# Patient Record
Sex: FEMALE | Race: WHITE | NOT HISPANIC OR LATINO | Employment: FULL TIME | URBAN - METROPOLITAN AREA
[De-identification: names, ages, dates, MRNs, and addresses within clinical notes are randomized per-mention and may not be internally consistent; named-entity substitution may affect disease eponyms.]

---

## 2020-03-11 ENCOUNTER — OFFICE VISIT (OUTPATIENT)
Dept: FAMILY MEDICINE CLINIC | Facility: CLINIC | Age: 50
End: 2020-03-11
Payer: COMMERCIAL

## 2020-03-11 VITALS
HEIGHT: 63 IN | BODY MASS INDEX: 33.42 KG/M2 | HEART RATE: 80 BPM | SYSTOLIC BLOOD PRESSURE: 130 MMHG | WEIGHT: 188.6 LBS | TEMPERATURE: 97.1 F | OXYGEN SATURATION: 97 % | RESPIRATION RATE: 12 BRPM | DIASTOLIC BLOOD PRESSURE: 82 MMHG

## 2020-03-11 DIAGNOSIS — Z12.11 SCREENING FOR COLORECTAL CANCER: ICD-10-CM

## 2020-03-11 DIAGNOSIS — Z12.31 ENCOUNTER FOR SCREENING MAMMOGRAM FOR BREAST CANCER: ICD-10-CM

## 2020-03-11 DIAGNOSIS — M79.642 BILATERAL HAND PAIN: ICD-10-CM

## 2020-03-11 DIAGNOSIS — R53.83 OTHER FATIGUE: ICD-10-CM

## 2020-03-11 DIAGNOSIS — Z12.12 SCREENING FOR COLORECTAL CANCER: ICD-10-CM

## 2020-03-11 DIAGNOSIS — Z13.1 SCREENING FOR DIABETES MELLITUS: ICD-10-CM

## 2020-03-11 DIAGNOSIS — Z13.6 SCREENING FOR CARDIOVASCULAR CONDITION: ICD-10-CM

## 2020-03-11 DIAGNOSIS — Z00.00 ANNUAL PHYSICAL EXAM: Primary | ICD-10-CM

## 2020-03-11 DIAGNOSIS — M25.50 ARTHRALGIA, UNSPECIFIED JOINT: ICD-10-CM

## 2020-03-11 DIAGNOSIS — Z12.4 SCREENING FOR CERVICAL CANCER: ICD-10-CM

## 2020-03-11 DIAGNOSIS — M79.641 BILATERAL HAND PAIN: ICD-10-CM

## 2020-03-11 PROCEDURE — 99386 PREV VISIT NEW AGE 40-64: CPT | Performed by: FAMILY MEDICINE

## 2020-03-11 NOTE — PROGRESS NOTES
[address] [facility] [address]    NAME: Lori Pederson  AGE: 45 y o  SEX: female  : 1970     DATE: 3/11/2020     Assessment and Plan:     Problem List Items Addressed This Visit     None      Visit Diagnoses     Annual physical exam    -  Primary    Ferdinand Cheadle is stable on exam   She is to continue to work on a lower carb diet, and regular exercise  FBW with TSH incl was ordered  Encounter for screening mammogram for breast cancer        Mammogram ordered  Relevant Orders    Mammo screening bilateral w cad    Screening for cervical cancer        Pt referred to OB/GYN at her request     Relevant Orders    Ambulatory referral to Obstetrics / Gynecology    Screening for colorectal cancer        Cologuard ordered  Relevant Orders    Cologuard    Screening for diabetes mellitus        Relevant Orders    Comprehensive metabolic panel    Screening for cardiovascular condition        Relevant Orders    Lipid Panel with Direct LDL reflex    Other fatigue        Relevant Orders    CBC and differential    TSH, 3rd generation with Free T4 reflex    BMI 33 0-33 9,adult        Diet and exercise as above  Bilateral hand pain        Likely OA changes - discussed performing warm soaks, using phong/Play Amy to work on hand strengthening, etc   Check xrays, arthritis screenings  Relevant Orders    XR hand 3+ vw left    XR hand 3+ vw right    Arthralgia, unspecified joint        As above  Relevant Orders    Sedimentation rate, automated    C-reactive protein    Lyme Antibody Profile with reflex to WB    Uric acid    BRYANT Screen w/ Reflex to Titer/Pattern    RF Screen w/ Reflex to Titer    Cyclic citrul peptide antibody, IgG          Immunizations and preventive care screenings were discussed with patient today  Appropriate education was printed on patient's after visit summary      Counseling:  Dental Health: discussed importance of regular tooth brushing, flossing, and dental visits  · Exercise: the importance of regular exercise/physical activity was discussed  Recommend exercise 3-5 times per week for at least 30 minutes  Return in 3 months (on 6/11/2020)  Chief Complaint:     Chief Complaint   Patient presents with    Annual Exam      History of Present Illness:     Adult Annual Physical   Patient here for a comprehensive physical exam  The patient reports no problems  Devang Rocha is new to the practice today  She has a hx of gout in the past   Has had issues for a few months with swelling of joints in fingers  Has been back working out at the gym x 2 months  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[facility] )  Never had mammogram   Needs PAP smear  Needs colon cancer screening  Formerly smoked cigarettes; now vapes - we discussed  Single; has an [place] Mastiff (133 lbs)  Declines colon cancer screening by GI  Diet and Physical Activity  · Diet/Nutrition: well balanced diet  · Exercise: 3-4 times a week on average  Depression Screening  PHQ-9 Depression Screening    PHQ-9:    Frequency of the following problems over the past two weeks:       Little interest or pleasure in doing things:  0 - not at all  Feeling down, depressed, or hopeless:  0 - not at all  PHQ-2 Score:  0       General Health  · Sleep: sleeps well  · Hearing: normal - bilateral   · Vision: no vision problems  · Dental: regular dental visits  /GYN Health  · Patient is: perimenopausal  · Last menstrual period: 3 months ago  · Contraceptive method: None  Review of Systems:     Review of Systems   Constitutional: Negative for activity change  Respiratory: Negative for shortness of breath  No GUSMAN  Cardiovascular: Negative for chest pain  No current CP/SOB  Gastrointestinal: Negative for abdominal pain, blood in stool and constipation  Genitourinary: Negative for menstrual problem  No new breast lumps on self-examination  Musculoskeletal: Positive for arthralgias  Past Medical History:     Past Medical History:   Diagnosis Date    Anxiety     Depression       Past Surgical History:     Past Surgical History:   Procedure Laterality Date    ROTATOR CUFF REPAIR        Social History:        Social History     Socioeconomic History    Marital status: Single     Spouse name: None    Number of children: None    Years of education: None    Highest education level: None   Occupational History    None   Social Needs    Financial resource strain: None    Food insecurity:     Worry: None     Inability: None    Transportation needs:     Medical: None     Non-medical: None   Tobacco Use    Smoking status: Former Smoker    Smokeless tobacco: Never Used   Substance and Sexual Activity    Alcohol use: Yes     Binge frequency: Less than monthly    Drug use: Never    Sexual activity: None   Lifestyle    Physical activity:     Days per week: None     Minutes per session: None    Stress: None   Relationships    Social connections:     Talks on phone: None     Gets together: None     Attends Worship service: None     Active member of club or organization: None     Attends meetings of clubs or organizations: None     Relationship status: None    Intimate partner violence:     Fear of current or ex partner: None     Emotionally abused: None     Physically abused: None     Forced sexual activity: None   Other Topics Concern    None   Social History Narrative    None      Family History:     History reviewed  No pertinent family history  Current Medications:     No current outpatient medications on file  No current facility-administered medications for this visit  Allergies:      Allergies   Allergen Reactions    Clarithromycin GI Intolerance      Physical Exam:     /82 (BP Location: Right arm, Patient Position: Sitting, Cuff Size: Standard)   Pulse 80   Temp (!) 97 1 °F (36 2 °C) (Tympanic)   Resp 12   Ht 5' 2 76" (1 594 m)   Wt 85 5 kg (188 lb 9 6 oz)   SpO2 97%   BMI 33 67 kg/m²     Physical Exam   Constitutional: She is oriented to person, place, and time  She appears well-developed and well-nourished  No distress  HENT:   Head: Normocephalic and atraumatic  Right Ear: Hearing, tympanic membrane, external ear and ear canal normal    Left Ear: Hearing, tympanic membrane, external ear and ear canal normal    Mouth/Throat: Oropharynx is clear and moist  No oropharyngeal exudate  Eyes: Conjunctivae are normal    Neck: Normal range of motion  Neck supple  No thyromegaly present  Cardiovascular: Normal rate, regular rhythm and normal heart sounds  Exam reveals no gallop and no friction rub  No murmur heard  Pulmonary/Chest: Effort normal and breath sounds normal  No stridor  No respiratory distress  She has no wheezes  She has no rales  Abdominal: Soft  Bowel sounds are normal  She exhibits no distension and no mass  There is no tenderness  There is no rebound and no guarding  Musculoskeletal:        Hands:  Lymphadenopathy:     She has no cervical adenopathy  Neurological: She is alert and oriented to person, place, and time  Skin: She is not diaphoretic  Psychiatric: She has a normal mood and affect  Her behavior is normal  Judgment and thought content normal    Nursing note and vitals reviewed        Gonzalo Garcia, [address] [facility]

## 2020-03-11 NOTE — PATIENT INSTRUCTIONS

## 2020-04-30 ENCOUNTER — OFFICE VISIT (OUTPATIENT)
Dept: FAMILY MEDICINE CLINIC | Facility: CLINIC | Age: 50
End: 2020-04-30
Payer: COMMERCIAL

## 2020-04-30 VITALS
WEIGHT: 191.4 LBS | BODY MASS INDEX: 34.17 KG/M2 | HEART RATE: 74 BPM | OXYGEN SATURATION: 99 % | DIASTOLIC BLOOD PRESSURE: 78 MMHG | SYSTOLIC BLOOD PRESSURE: 118 MMHG | TEMPERATURE: 97 F | RESPIRATION RATE: 14 BRPM

## 2020-04-30 DIAGNOSIS — R53.83 OTHER FATIGUE: ICD-10-CM

## 2020-04-30 DIAGNOSIS — Z13.6 SCREENING FOR CARDIOVASCULAR CONDITION: ICD-10-CM

## 2020-04-30 DIAGNOSIS — M25.50 ARTHRALGIA, UNSPECIFIED JOINT: Primary | ICD-10-CM

## 2020-04-30 DIAGNOSIS — Z13.1 SCREENING FOR DIABETES MELLITUS: ICD-10-CM

## 2020-04-30 PROCEDURE — 99213 OFFICE O/P EST LOW 20 MIN: CPT | Performed by: FAMILY MEDICINE

## 2020-04-30 PROCEDURE — 1036F TOBACCO NON-USER: CPT | Performed by: FAMILY MEDICINE

## 2020-04-30 RX ORDER — NAPROXEN 500 MG/1
500 TABLET ORAL 2 TIMES DAILY WITH MEALS
Qty: 40 TABLET | Refills: 1 | Status: SHIPPED | OUTPATIENT
Start: 2020-04-30 | End: 2020-05-08 | Stop reason: SDUPTHER

## 2020-05-08 ENCOUNTER — TELEPHONE (OUTPATIENT)
Dept: FAMILY MEDICINE CLINIC | Facility: CLINIC | Age: 50
End: 2020-05-08

## 2020-05-08 DIAGNOSIS — M05.80 POLYARTHRITIS WITH POSITIVE RHEUMATOID FACTOR (HCC): ICD-10-CM

## 2020-05-08 DIAGNOSIS — M25.50 ARTHRALGIA, UNSPECIFIED JOINT: Primary | ICD-10-CM

## 2020-05-08 RX ORDER — MELOXICAM 7.5 MG/1
7.5 TABLET ORAL DAILY
Qty: 30 TABLET | Refills: 5 | Status: SHIPPED | OUTPATIENT
Start: 2020-05-08 | End: 2020-08-19 | Stop reason: ALTCHOICE

## 2020-05-09 LAB
ALBUMIN SERPL-MCNC: 3.9 G/DL (ref 3.6–5.1)
ALBUMIN/GLOB SERPL: 1.4 (CALC) (ref 1–2.5)
ALP SERPL-CCNC: 63 U/L (ref 37–153)
ALT SERPL-CCNC: 17 U/L (ref 6–29)
ANA SER QL IF: NEGATIVE
AST SERPL-CCNC: 14 U/L (ref 10–35)
B BURGDOR AB SER IA-ACNC: <0.9 INDEX
BASOPHILS # BLD AUTO: 68 CELLS/UL (ref 0–200)
BASOPHILS NFR BLD AUTO: 1 %
BILIRUB SERPL-MCNC: 0.3 MG/DL (ref 0.2–1.2)
BUN SERPL-MCNC: 21 MG/DL (ref 7–25)
BUN/CREAT SERPL: NORMAL (CALC) (ref 6–22)
CALCIUM SERPL-MCNC: 9.3 MG/DL (ref 8.6–10.4)
CCP IGG SERPL-ACNC: 59 UNITS
CHLORIDE SERPL-SCNC: 109 MMOL/L (ref 98–110)
CHOLEST SERPL-MCNC: 186 MG/DL
CHOLEST/HDLC SERPL: 3.4 (CALC)
CO2 SERPL-SCNC: 27 MMOL/L (ref 20–32)
CREAT SERPL-MCNC: 0.7 MG/DL (ref 0.5–1.05)
CRP SERPL-MCNC: 9.2 MG/L
EOSINOPHIL # BLD AUTO: 163 CELLS/UL (ref 15–500)
EOSINOPHIL NFR BLD AUTO: 2.4 %
ERYTHROCYTE [DISTWIDTH] IN BLOOD BY AUTOMATED COUNT: 12.8 % (ref 11–15)
ERYTHROCYTE [SEDIMENTATION RATE] IN BLOOD BY WESTERGREN METHOD: 19 MM/H
GLOBULIN SER CALC-MCNC: 2.8 G/DL (CALC) (ref 1.9–3.7)
GLUCOSE SERPL-MCNC: 78 MG/DL (ref 65–99)
HCT VFR BLD AUTO: 35.7 % (ref 35–45)
HDLC SERPL-MCNC: 54 MG/DL
HGB BLD-MCNC: 11.7 G/DL (ref 11.7–15.5)
LDLC SERPL CALC-MCNC: 114 MG/DL (CALC)
LYMPHOCYTES # BLD AUTO: 1659 CELLS/UL (ref 850–3900)
LYMPHOCYTES NFR BLD AUTO: 24.4 %
MCH RBC QN AUTO: 28.7 PG (ref 27–33)
MCHC RBC AUTO-ENTMCNC: 32.8 G/DL (ref 32–36)
MCV RBC AUTO: 87.5 FL (ref 80–100)
MONOCYTES # BLD AUTO: 551 CELLS/UL (ref 200–950)
MONOCYTES NFR BLD AUTO: 8.1 %
NEUTROPHILS # BLD AUTO: 4359 CELLS/UL (ref 1500–7800)
NEUTROPHILS NFR BLD AUTO: 64.1 %
NONHDLC SERPL-MCNC: 132 MG/DL (CALC)
PLATELET # BLD AUTO: 349 THOUSAND/UL (ref 140–400)
PMV BLD REES-ECKER: 9.9 FL (ref 7.5–12.5)
POTASSIUM SERPL-SCNC: 4.6 MMOL/L (ref 3.5–5.3)
PROT SERPL-MCNC: 6.7 G/DL (ref 6.1–8.1)
RBC # BLD AUTO: 4.08 MILLION/UL (ref 3.8–5.1)
RHEUMATOID FACT SERPL-ACNC: 45 IU/ML
SL AMB EGFR AFRICAN AMERICAN: 116 ML/MIN/1.73M2
SL AMB EGFR NON AFRICAN AMERICAN: 100 ML/MIN/1.73M2
SODIUM SERPL-SCNC: 142 MMOL/L (ref 135–146)
TRIGL SERPL-MCNC: 85 MG/DL
TSH SERPL-ACNC: 1.35 MIU/L
URATE SERPL-MCNC: 4.8 MG/DL (ref 2.5–7)
WBC # BLD AUTO: 6.8 THOUSAND/UL (ref 3.8–10.8)

## 2020-05-12 ENCOUNTER — TELEPHONE (OUTPATIENT)
Dept: OBGYN CLINIC | Facility: HOSPITAL | Age: 50
End: 2020-05-12

## 2020-05-12 DIAGNOSIS — M05.79 RHEUMATOID ARTHRITIS INVOLVING MULTIPLE SITES WITH POSITIVE RHEUMATOID FACTOR (HCC): Primary | ICD-10-CM

## 2020-05-12 RX ORDER — PREDNISONE 1 MG/1
TABLET ORAL
Qty: 70 TABLET | Refills: 0 | Status: SHIPPED | OUTPATIENT
Start: 2020-05-12 | End: 2020-06-14

## 2020-05-13 ENCOUNTER — TELEPHONE (OUTPATIENT)
Dept: OBGYN CLINIC | Facility: HOSPITAL | Age: 50
End: 2020-05-13

## 2020-05-13 ENCOUNTER — TELEMEDICINE (OUTPATIENT)
Dept: RHEUMATOLOGY | Facility: CLINIC | Age: 50
End: 2020-05-13
Payer: COMMERCIAL

## 2020-05-13 DIAGNOSIS — Z79.899 HIGH RISK MEDICATION USE: ICD-10-CM

## 2020-05-13 DIAGNOSIS — M05.79 RHEUMATOID ARTHRITIS INVOLVING MULTIPLE SITES WITH POSITIVE RHEUMATOID FACTOR (HCC): Primary | ICD-10-CM

## 2020-05-13 PROCEDURE — 99204 OFFICE O/P NEW MOD 45 MIN: CPT | Performed by: INTERNAL MEDICINE

## 2020-05-13 RX ORDER — HYDROXYCHLOROQUINE SULFATE 200 MG/1
200 TABLET, FILM COATED ORAL 2 TIMES DAILY
Qty: 60 TABLET | Refills: 3 | Status: SHIPPED | OUTPATIENT
Start: 2020-05-13 | End: 2020-07-22 | Stop reason: SDUPTHER

## 2020-06-11 ENCOUNTER — TELEPHONE (OUTPATIENT)
Dept: OBGYN CLINIC | Facility: CLINIC | Age: 50
End: 2020-06-11

## 2020-06-11 ENCOUNTER — TELEPHONE (OUTPATIENT)
Dept: OBGYN CLINIC | Facility: MEDICAL CENTER | Age: 50
End: 2020-06-11

## 2020-06-14 DIAGNOSIS — M05.79 RHEUMATOID ARTHRITIS INVOLVING MULTIPLE SITES WITH POSITIVE RHEUMATOID FACTOR (HCC): Primary | ICD-10-CM

## 2020-06-14 RX ORDER — PREDNISONE 1 MG/1
5 TABLET ORAL DAILY
Qty: 30 TABLET | Refills: 0 | Status: SHIPPED | OUTPATIENT
Start: 2020-06-14 | End: 2020-08-19 | Stop reason: ALTCHOICE

## 2020-06-17 ENCOUNTER — TELEPHONE (OUTPATIENT)
Dept: OBGYN CLINIC | Facility: HOSPITAL | Age: 50
End: 2020-06-17

## 2020-07-22 ENCOUNTER — TELEPHONE (OUTPATIENT)
Dept: OBGYN CLINIC | Facility: HOSPITAL | Age: 50
End: 2020-07-22

## 2020-07-22 DIAGNOSIS — M05.79 RHEUMATOID ARTHRITIS INVOLVING MULTIPLE SITES WITH POSITIVE RHEUMATOID FACTOR (HCC): ICD-10-CM

## 2020-07-22 RX ORDER — HYDROXYCHLOROQUINE SULFATE 200 MG/1
200 TABLET, FILM COATED ORAL 2 TIMES DAILY
Qty: 180 TABLET | Refills: 1 | Status: SHIPPED | OUTPATIENT
Start: 2020-07-22 | End: 2020-08-19 | Stop reason: SDUPTHER

## 2020-07-22 NOTE — TELEPHONE ENCOUNTER
[facility] contacted Call Center requested refill of their medication  Medication Name:hydroxychloroquine (PLAQUENIL) 200 mg tablet       Dosage of Med:      Frequency of Med:      Remaining Medication:      Pharmacy and Hany Akbar        Pt  Preferred Callback Phone Number:      Thank you      [facility]#[phone]  Fax #[phone]

## 2020-08-10 ENCOUNTER — OFFICE VISIT (OUTPATIENT)
Dept: FAMILY MEDICINE CLINIC | Facility: CLINIC | Age: 50
End: 2020-08-10
Payer: COMMERCIAL

## 2020-08-10 ENCOUNTER — HOSPITAL ENCOUNTER (OUTPATIENT)
Dept: NON INVASIVE DIAGNOSTICS | Facility: CLINIC | Age: 50
Discharge: HOME | End: 2020-08-10
Payer: COMMERCIAL

## 2020-08-10 VITALS
HEIGHT: 63 IN | OXYGEN SATURATION: 97 % | WEIGHT: 190 LBS | BODY MASS INDEX: 33.66 KG/M2 | HEART RATE: 87 BPM | TEMPERATURE: 97.8 F | DIASTOLIC BLOOD PRESSURE: 70 MMHG | RESPIRATION RATE: 16 BRPM | SYSTOLIC BLOOD PRESSURE: 110 MMHG

## 2020-08-10 DIAGNOSIS — R60.0 EDEMA LEG: ICD-10-CM

## 2020-08-10 DIAGNOSIS — R60.0 EDEMA LEG: Primary | ICD-10-CM

## 2020-08-10 PROBLEM — M65.20 CALCIFIC TENDINITIS: Status: ACTIVE | Noted: 2018-08-09

## 2020-08-10 PROBLEM — M54.2 CERVICAL PAIN: Status: ACTIVE | Noted: 2018-08-09

## 2020-08-10 PROBLEM — M19.90 ARTHRITIS: Status: ACTIVE | Noted: 2020-08-10

## 2020-08-10 PROCEDURE — 93971 EXTREMITY STUDY: CPT | Performed by: SURGERY

## 2020-08-10 PROCEDURE — 3008F BODY MASS INDEX DOCD: CPT | Performed by: NURSE PRACTITIONER

## 2020-08-10 PROCEDURE — 1036F TOBACCO NON-USER: CPT | Performed by: NURSE PRACTITIONER

## 2020-08-10 PROCEDURE — 93971 EXTREMITY STUDY: CPT

## 2020-08-10 PROCEDURE — 99213 OFFICE O/P EST LOW 20 MIN: CPT | Performed by: NURSE PRACTITIONER

## 2020-08-10 NOTE — PROGRESS NOTES
Assessment/Plan:           Problem List Items Addressed This Visit     None      Visit Diagnoses     Edema leg    -  Primary    Relevant Orders    VAS lower limb venous duplex study, unilateral/limited          Water with lemon  Defer diuretic at present      Subjective:      Patient ID: Yovani Carlos is a 45 y o  female  Here for c/o right shin swelling and pain  Started less than one week ago  No recent travel   No  Trauma  C/o knee pain and swelling  Dx with rheumatoid arthritis recently  No sob or rayo   no chest pain        The following portions of the patient's history were reviewed and updated as appropriate: allergies, current medications, past family history, past medical history, past social history, past surgical history and problem list     Review of Systems   Constitutional: Positive for fatigue  Negative for diaphoresis and fever  Respiratory: Negative for cough, shortness of breath and wheezing  Cardiovascular: Positive for leg swelling  Negative for chest pain and palpitations  Gastrointestinal: Negative for constipation and diarrhea  Musculoskeletal: Positive for arthralgias and myalgias  Skin: Negative for rash  Neurological: Negative for dizziness, light-headedness and headaches  Hematological: Negative for adenopathy  Psychiatric/Behavioral: Negative for dysphoric mood and sleep disturbance  The patient is not nervous/anxious  Objective:      /70   Pulse 87   Temp 97 8 °F (36 6 °C)   Resp 16   Ht 5' 2 76" (1 594 m)   Wt 86 2 kg (190 lb)   SpO2 97%   BMI 33 91 kg/m²     History reviewed  No pertinent family history    Past Medical History:   Diagnosis Date    Anxiety     Depression      Social History     Socioeconomic History    Marital status: Single     Spouse name: Not on file    Number of children: Not on file    Years of education: Not on file    Highest education level: Not on file   Occupational History    Not on file   Social Needs    Financial resource strain: Not on file    Food insecurity     Worry: Not on file     Inability: Not on file    Transportation needs     Medical: Not on file     Non-medical: Not on file   Tobacco Use    Smoking status: Former Smoker    Smokeless tobacco: Never Used   Substance and Sexual Activity    Alcohol use: Yes     Binge frequency: Less than monthly    Drug use: Never    Sexual activity: Not on file   Lifestyle    Physical activity     Days per week: Not on file     Minutes per session: Not on file    Stress: Not on file   Relationships    Social connections     Talks on phone: Not on file     Gets together: Not on file     Attends [employer] service: Not on file     Active member of club or organization: Not on file     Attends meetings of clubs or organizations: Not on file     Relationship status: Not on file    Intimate partner violence     Fear of current or ex partner: Not on file     Emotionally abused: Not on file     Physically abused: Not on file     Forced sexual activity: Not on file   Other Topics Concern    Not on file   Social History Narrative    Not on file       Current Outpatient Medications:     hydroxychloroquine (PLAQUENIL) 200 mg tablet, Take 1 tablet (200 mg total) by mouth 2 (two) times a day, Disp: 180 tablet, Rfl: 1    predniSONE 5 mg tablet, Take 1 tablet (5 mg total) by mouth daily, Disp: 30 tablet, Rfl: 0    meloxicam (MOBIC) 7 5 mg tablet, Take 1 tablet (7 5 mg total) by mouth daily (Patient not taking: Reported on 8/10/2020), Disp: 30 tablet, Rfl: 5  Allergies   Allergen Reactions    Clarithromycin GI Intolerance        Physical Exam  Vitals signs and nursing note reviewed  Cardiovascular:      Rate and Rhythm: Normal rate and regular rhythm  Pulmonary:      Effort: Pulmonary effort is normal       Breath sounds: Normal breath sounds  Musculoskeletal: Normal range of motion  General: Swelling present  Right lower leg: She exhibits swelling  Legs:       Comments: 2cm lump medial aspect of shin  Minimal leg swelling right leg     Skin:     General: Skin is warm and dry  Capillary Refill: Capillary refill takes less than 2 seconds  Neurological:      General: No focal deficit present  Mental Status: She is alert and oriented to person, place, and time  Psychiatric:         Mood and Affect: Mood normal          Behavior: Behavior normal          Thought Content: Thought content normal          Judgment: Judgment normal            BMI Counseling: Body mass index is 33 91 kg/m²  The BMI is above normal  Nutrition recommendations include reducing portion sizes, decreasing overall calorie intake, 3-5 servings of fruits/vegetables daily, reducing fast food intake, consuming healthier snacks, decreasing soda and/or juice intake, moderation in carbohydrate intake, increasing intake of lean protein, reducing intake of saturated fat and trans fat and reducing intake of cholesterol  Exercise recommendations include moderate aerobic physical activity for 150 minutes/week, exercising 3-5 times per week and strength training exercises

## 2020-08-13 ENCOUNTER — TELEMEDICINE (OUTPATIENT)
Dept: FAMILY MEDICINE CLINIC | Facility: CLINIC | Age: 50
End: 2020-08-13
Payer: COMMERCIAL

## 2020-08-13 DIAGNOSIS — R53.83 FATIGUE, UNSPECIFIED TYPE: ICD-10-CM

## 2020-08-13 DIAGNOSIS — J02.9 SORE THROAT: Primary | ICD-10-CM

## 2020-08-13 DIAGNOSIS — J02.9 SORE THROAT: ICD-10-CM

## 2020-08-13 PROCEDURE — U0003 INFECTIOUS AGENT DETECTION BY NUCLEIC ACID (DNA OR RNA); SEVERE ACUTE RESPIRATORY SYNDROME CORONAVIRUS 2 (SARS-COV-2) (CORONAVIRUS DISEASE [COVID-19]), AMPLIFIED PROBE TECHNIQUE, MAKING USE OF HIGH THROUGHPUT TECHNOLOGIES AS DESCRIBED BY CMS-2020-01-R: HCPCS | Performed by: NURSE PRACTITIONER

## 2020-08-13 PROCEDURE — 99213 OFFICE O/P EST LOW 20 MIN: CPT | Performed by: NURSE PRACTITIONER

## 2020-08-13 PROCEDURE — 1036F TOBACCO NON-USER: CPT | Performed by: NURSE PRACTITIONER

## 2020-08-13 NOTE — PROGRESS NOTES
Virtual Regular Visit      Assessment/Plan:    Problem List Items Addressed This Visit     None      Visit Diagnoses     Sore throat    -  Primary    Relevant Orders    Novel Coronavirus (COVID-19), PCR [facility] - Collected at [facility]    Fatigue, unspecified type        Relevant Orders    Novel Coronavirus (COVID-19), PCR [facility] - [facility]               Reason for visit is   Chief Complaint   Patient presents with    Virtual Regular Visit        Encounter provider ELVIRA Alfaro    Provider located at [address] [address]      Recent Visits  Date Type Provider Dept   08/10/20 Office Visit [facility]   Showing recent visits within past 7 days and meeting all other requirements     Today's Visits  Date Type Provider Dept   08/13/20 Telemedicine [facility]   Showing today's visits and meeting all other requirements     Future Appointments  No visits were found meeting these conditions  Showing future appointments within next 150 days and meeting all other requirements        The patient was identified by name and date of birth  Mishel Rodriguez was informed that this is a telemedicine visit and that the visit is being conducted through 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  She now has started with 2 day hx of headache, sore throat and extreme fatigue  Was exposed over the weekend  No fever or chills  No cough or sob  Took no meds other than tylenol         Past Medical History:   Diagnosis Date    Anxiety     Depression        Past Surgical History:   Procedure Laterality Date    ROTATOR CUFF REPAIR         Current Outpatient Medications   Medication Sig Dispense Refill    hydroxychloroquine (PLAQUENIL) 200 mg tablet Take 1 tablet (200 mg total) by mouth 2 (two) times a day 180 tablet 1    predniSONE 5 mg tablet Take 1 tablet (5 mg total) by mouth daily 30 tablet 0    TRAMADOL HCL PO Take by mouth      meloxicam (MOBIC) 7 5 mg tablet Take 1 tablet (7 5 mg total) by mouth daily (Patient not taking: Reported on 8/10/2020) 30 tablet 5     No current facility-administered medications for this visit  Allergies   Allergen Reactions    Clarithromycin GI Intolerance       Review of Systems   Constitutional: Positive for fatigue  Negative for chills  HENT: Positive for sore throat  Negative for congestion and postnasal drip  Eyes: Negative for photophobia and visual disturbance  Respiratory: Negative for cough and shortness of breath  Cardiovascular: Negative for chest pain and palpitations  Gastrointestinal: Negative for constipation and diarrhea  Genitourinary: Negative for dysuria and frequency  Musculoskeletal: Negative for arthralgias and myalgias  Skin: Negative for rash  Neurological: Positive for headaches  Negative for dizziness and light-headedness  Hematological: Negative for adenopathy  Psychiatric/Behavioral: Negative for dysphoric mood  The patient is not nervous/anxious  It was my intent to perform this visit via video technology but the patient was not able to do a video connection so the visit was completed via audio telephone only      Video Exam    Vitals:       Physical Exam     I spent 15 minutes with patient today in which greater than 50% of the time was spent in counseling/coordination of care regarding cold and covid symptoms      VIRTUAL VISIT DISCLAIMER    Bobalexsander Shelton acknowledges that she has consented to an online visit or consultation  She understands that the online visit is based solely on information provided by her, and that, in the absence of a face-to-face physical evaluation by the physician, the diagnosis she receives is both limited and provisional in terms of accuracy and completeness  This is not intended to replace a full medical face-to-face evaluation by the physician  Bobalexsander Shelton understands and accepts these terms

## 2020-08-14 LAB — SARS-COV-2 RNA SPEC QL NAA+PROBE: NOT DETECTED

## 2020-08-19 ENCOUNTER — OFFICE VISIT (OUTPATIENT)
Dept: RHEUMATOLOGY | Facility: CLINIC | Age: 50
End: 2020-08-19
Payer: COMMERCIAL

## 2020-08-19 VITALS
HEIGHT: 63 IN | DIASTOLIC BLOOD PRESSURE: 80 MMHG | TEMPERATURE: 98.6 F | SYSTOLIC BLOOD PRESSURE: 112 MMHG | BODY MASS INDEX: 33.66 KG/M2 | WEIGHT: 190 LBS

## 2020-08-19 DIAGNOSIS — E55.9 VITAMIN D DEFICIENCY: ICD-10-CM

## 2020-08-19 DIAGNOSIS — M05.79 RHEUMATOID ARTHRITIS INVOLVING MULTIPLE SITES WITH POSITIVE RHEUMATOID FACTOR (HCC): Primary | ICD-10-CM

## 2020-08-19 DIAGNOSIS — M05.80 POLYARTHRITIS WITH POSITIVE RHEUMATOID FACTOR (HCC): ICD-10-CM

## 2020-08-19 DIAGNOSIS — M79.10 MYALGIA: ICD-10-CM

## 2020-08-19 DIAGNOSIS — M25.50 ARTHRALGIA, UNSPECIFIED JOINT: ICD-10-CM

## 2020-08-19 DIAGNOSIS — Z79.899 HIGH RISK MEDICATION USE: ICD-10-CM

## 2020-08-19 PROCEDURE — 99215 OFFICE O/P EST HI 40 MIN: CPT | Performed by: INTERNAL MEDICINE

## 2020-08-19 PROCEDURE — 3008F BODY MASS INDEX DOCD: CPT | Performed by: NURSE PRACTITIONER

## 2020-08-19 PROCEDURE — 96372 THER/PROPH/DIAG INJ SC/IM: CPT | Performed by: INTERNAL MEDICINE

## 2020-08-19 RX ORDER — DICLOFENAC SODIUM 75 MG/1
75 TABLET, DELAYED RELEASE ORAL 2 TIMES DAILY
Qty: 60 TABLET | Refills: 3 | Status: SHIPPED | OUTPATIENT
Start: 2020-08-19 | End: 2020-12-15

## 2020-08-19 RX ORDER — HYDROXYCHLOROQUINE SULFATE 200 MG/1
200 TABLET, FILM COATED ORAL 2 TIMES DAILY
Qty: 60 TABLET | Refills: 3 | Status: SHIPPED | OUTPATIENT
Start: 2020-08-19 | End: 2020-11-24 | Stop reason: SDUPTHER

## 2020-08-19 RX ORDER — TRIAMCINOLONE ACETONIDE 40 MG/ML
80 INJECTION, SUSPENSION INTRA-ARTICULAR; INTRAMUSCULAR ONCE
Status: COMPLETED | OUTPATIENT
Start: 2020-08-19 | End: 2020-08-19

## 2020-08-19 RX ORDER — FOLIC ACID 1 MG/1
1 TABLET ORAL DAILY
Qty: 30 TABLET | Refills: 5 | Status: SHIPPED | OUTPATIENT
Start: 2020-08-19 | End: 2020-11-24 | Stop reason: SDUPTHER

## 2020-08-19 RX ADMIN — TRIAMCINOLONE ACETONIDE 80 MG: 40 INJECTION, SUSPENSION INTRA-ARTICULAR; INTRAMUSCULAR at 16:52

## 2020-08-20 ENCOUNTER — HOSPITAL ENCOUNTER (OUTPATIENT)
Dept: RADIOLOGY | Facility: HOSPITAL | Age: 50
Discharge: HOME | End: 2020-08-20
Attending: FAMILY MEDICINE
Payer: COMMERCIAL

## 2020-08-20 DIAGNOSIS — M79.641 BILATERAL HAND PAIN: ICD-10-CM

## 2020-08-20 DIAGNOSIS — M79.642 BILATERAL HAND PAIN: ICD-10-CM

## 2020-08-20 PROCEDURE — 73130 X-RAY EXAM OF HAND: CPT

## 2020-08-26 NOTE — RESULT ENCOUNTER NOTE
Please let the patient know that their bilateral hand xrays did come back normal   Thanks     Dr Ashanti Hood

## 2020-09-03 ENCOUNTER — TELEPHONE (OUTPATIENT)
Dept: OBGYN CLINIC | Facility: HOSPITAL | Age: 50
End: 2020-09-03

## 2020-09-03 DIAGNOSIS — M05.79 RHEUMATOID ARTHRITIS INVOLVING MULTIPLE SITES WITH POSITIVE RHEUMATOID FACTOR (HCC): Primary | ICD-10-CM

## 2020-09-03 RX ORDER — PREDNISONE 1 MG/1
TABLET ORAL
Qty: 70 TABLET | Refills: 0 | Status: SHIPPED | OUTPATIENT
Start: 2020-09-03 | End: 2020-10-23 | Stop reason: ALTCHOICE

## 2020-09-03 NOTE — TELEPHONE ENCOUNTER
I think we gave her a steroid injection in her arm IM at her visit, not in the knee  Anyways, please let her know that I'm sending to her pharmacy a 4-week prednisone taper, which should start helping      Thanks,  HM

## 2020-09-03 NOTE — TELEPHONE ENCOUNTER
Patient sees Dr Joanna De Los Santos  Patient received an injection in her knee on 8/19  She stated everything was fine after that until yesterday and her knee started to swell again  She wanted to know what she can do, she stated she is leaving for vacation on Monday          CB: [phone]

## 2020-09-24 ENCOUNTER — TRANSCRIBE ORDERS (OUTPATIENT)
Dept: LAB | Facility: CLINIC | Age: 50
End: 2020-09-24

## 2020-09-24 ENCOUNTER — APPOINTMENT (OUTPATIENT)
Dept: LAB | Facility: CLINIC | Age: 50
End: 2020-09-24
Payer: COMMERCIAL

## 2020-09-24 LAB
25(OH)D3 SERPL-MCNC: 16.6 NG/ML (ref 30–100)
ALBUMIN SERPL BCP-MCNC: 3.9 G/DL (ref 3.5–5)
ALP SERPL-CCNC: 56 U/L (ref 46–116)
ALT SERPL W P-5'-P-CCNC: 30 U/L (ref 12–78)
ANION GAP SERPL CALCULATED.3IONS-SCNC: 9 MMOL/L (ref 4–13)
AST SERPL W P-5'-P-CCNC: 13 U/L (ref 5–45)
BASOPHILS # BLD AUTO: 0.04 THOUSANDS/ΜL (ref 0–0.1)
BASOPHILS NFR BLD AUTO: 1 % (ref 0–1)
BILIRUB SERPL-MCNC: 0.38 MG/DL (ref 0.2–1)
BUN SERPL-MCNC: 17 MG/DL (ref 5–25)
CALCIUM SERPL-MCNC: 9.1 MG/DL (ref 8.3–10.1)
CHLORIDE SERPL-SCNC: 105 MMOL/L (ref 100–108)
CO2 SERPL-SCNC: 26 MMOL/L (ref 21–32)
CREAT SERPL-MCNC: 0.59 MG/DL (ref 0.6–1.3)
CRP SERPL QL: <3 MG/L
EOSINOPHIL # BLD AUTO: 0.1 THOUSAND/ΜL (ref 0–0.61)
EOSINOPHIL NFR BLD AUTO: 1 % (ref 0–6)
ERYTHROCYTE [DISTWIDTH] IN BLOOD BY AUTOMATED COUNT: 13.9 % (ref 11.6–15.1)
ERYTHROCYTE [SEDIMENTATION RATE] IN BLOOD: 11 MM/HOUR (ref 0–29)
GFR SERPL CREATININE-BSD FRML MDRD: 106 ML/MIN/1.73SQ M
GLUCOSE SERPL-MCNC: 79 MG/DL (ref 65–140)
HBV CORE AB SER QL: NORMAL
HBV CORE IGM SER QL: NORMAL
HBV SURFACE AG SER QL: NORMAL
HCT VFR BLD AUTO: 38.5 % (ref 34.8–46.1)
HCV AB SER QL: NORMAL
HGB BLD-MCNC: 12.4 G/DL (ref 11.5–15.4)
IMM GRANULOCYTES # BLD AUTO: 0.03 THOUSAND/UL (ref 0–0.2)
IMM GRANULOCYTES NFR BLD AUTO: 0 % (ref 0–2)
LYMPHOCYTES # BLD AUTO: 2.01 THOUSANDS/ΜL (ref 0.6–4.47)
LYMPHOCYTES NFR BLD AUTO: 25 % (ref 14–44)
MCH RBC QN AUTO: 29.2 PG (ref 26.8–34.3)
MCHC RBC AUTO-ENTMCNC: 32.2 G/DL (ref 31.4–37.4)
MCV RBC AUTO: 91 FL (ref 82–98)
MONOCYTES # BLD AUTO: 0.55 THOUSAND/ΜL (ref 0.17–1.22)
MONOCYTES NFR BLD AUTO: 7 % (ref 4–12)
NEUTROPHILS # BLD AUTO: 5.29 THOUSANDS/ΜL (ref 1.85–7.62)
NEUTS SEG NFR BLD AUTO: 66 % (ref 43–75)
NRBC BLD AUTO-RTO: 0 /100 WBCS
PLATELET # BLD AUTO: 358 THOUSANDS/UL (ref 149–390)
PMV BLD AUTO: 9.1 FL (ref 8.9–12.7)
POTASSIUM SERPL-SCNC: 3.9 MMOL/L (ref 3.5–5.3)
PROT SERPL-MCNC: 7.6 G/DL (ref 6.4–8.2)
RBC # BLD AUTO: 4.24 MILLION/UL (ref 3.81–5.12)
SODIUM SERPL-SCNC: 140 MMOL/L (ref 136–145)
WBC # BLD AUTO: 8.02 THOUSAND/UL (ref 4.31–10.16)

## 2020-09-24 PROCEDURE — 86803 HEPATITIS C AB TEST: CPT | Performed by: INTERNAL MEDICINE

## 2020-09-24 PROCEDURE — 85025 COMPLETE CBC W/AUTO DIFF WBC: CPT | Performed by: INTERNAL MEDICINE

## 2020-09-24 PROCEDURE — 82306 VITAMIN D 25 HYDROXY: CPT | Performed by: INTERNAL MEDICINE

## 2020-09-24 PROCEDURE — 86140 C-REACTIVE PROTEIN: CPT | Performed by: INTERNAL MEDICINE

## 2020-09-24 PROCEDURE — 87340 HEPATITIS B SURFACE AG IA: CPT | Performed by: INTERNAL MEDICINE

## 2020-09-24 PROCEDURE — 85652 RBC SED RATE AUTOMATED: CPT | Performed by: INTERNAL MEDICINE

## 2020-09-24 PROCEDURE — 80053 COMPREHEN METABOLIC PANEL: CPT | Performed by: INTERNAL MEDICINE

## 2020-09-24 PROCEDURE — 86704 HEP B CORE ANTIBODY TOTAL: CPT | Performed by: INTERNAL MEDICINE

## 2020-09-24 PROCEDURE — 36415 COLL VENOUS BLD VENIPUNCTURE: CPT | Performed by: INTERNAL MEDICINE

## 2020-09-24 PROCEDURE — 86705 HEP B CORE ANTIBODY IGM: CPT | Performed by: INTERNAL MEDICINE

## 2020-09-28 RX ORDER — ERGOCALCIFEROL 1.25 MG/1
50000 CAPSULE ORAL WEEKLY
Qty: 12 CAPSULE | Refills: 0 | Status: SHIPPED | OUTPATIENT
Start: 2020-09-28 | End: 2020-12-24

## 2020-10-21 ENCOUNTER — TELEMEDICINE (OUTPATIENT)
Dept: FAMILY MEDICINE CLINIC | Facility: CLINIC | Age: 50
End: 2020-10-21
Payer: COMMERCIAL

## 2020-10-21 VITALS — WEIGHT: 190 LBS | BODY MASS INDEX: 33.91 KG/M2 | TEMPERATURE: 98 F

## 2020-10-21 DIAGNOSIS — Z20.828 EXPOSURE TO SARS-ASSOCIATED CORONAVIRUS: Primary | ICD-10-CM

## 2020-10-21 PROCEDURE — 99214 OFFICE O/P EST MOD 30 MIN: CPT | Performed by: FAMILY MEDICINE

## 2020-10-22 DIAGNOSIS — Z20.828 EXPOSURE TO SARS-ASSOCIATED CORONAVIRUS: ICD-10-CM

## 2020-10-22 PROCEDURE — U0003 INFECTIOUS AGENT DETECTION BY NUCLEIC ACID (DNA OR RNA); SEVERE ACUTE RESPIRATORY SYNDROME CORONAVIRUS 2 (SARS-COV-2) (CORONAVIRUS DISEASE [COVID-19]), AMPLIFIED PROBE TECHNIQUE, MAKING USE OF HIGH THROUGHPUT TECHNOLOGIES AS DESCRIBED BY CMS-2020-01-R: HCPCS | Performed by: FAMILY MEDICINE

## 2020-10-23 ENCOUNTER — TELEMEDICINE (OUTPATIENT)
Dept: FAMILY MEDICINE CLINIC | Facility: CLINIC | Age: 50
End: 2020-10-23
Payer: COMMERCIAL

## 2020-10-23 VITALS — TEMPERATURE: 98.2 F

## 2020-10-23 DIAGNOSIS — R09.82 POSTNASAL DRIP: Primary | ICD-10-CM

## 2020-10-23 LAB — SARS-COV-2 RNA SPEC QL NAA+PROBE: NOT DETECTED

## 2020-10-23 PROCEDURE — 99213 OFFICE O/P EST LOW 20 MIN: CPT | Performed by: FAMILY MEDICINE

## 2020-10-23 PROCEDURE — 1036F TOBACCO NON-USER: CPT | Performed by: FAMILY MEDICINE

## 2020-11-24 ENCOUNTER — OFFICE VISIT (OUTPATIENT)
Dept: RHEUMATOLOGY | Facility: CLINIC | Age: 50
End: 2020-11-24
Payer: COMMERCIAL

## 2020-11-24 VITALS — SYSTOLIC BLOOD PRESSURE: 132 MMHG | DIASTOLIC BLOOD PRESSURE: 84 MMHG | HEART RATE: 83 BPM

## 2020-11-24 DIAGNOSIS — Z79.899 HIGH RISK MEDICATION USE: ICD-10-CM

## 2020-11-24 DIAGNOSIS — E55.9 VITAMIN D DEFICIENCY: ICD-10-CM

## 2020-11-24 DIAGNOSIS — Z79.899 LONG-TERM USE OF PLAQUENIL: ICD-10-CM

## 2020-11-24 DIAGNOSIS — Z79.899 METHOTREXATE, LONG TERM, CURRENT USE: ICD-10-CM

## 2020-11-24 DIAGNOSIS — M05.79 RHEUMATOID ARTHRITIS INVOLVING MULTIPLE SITES WITH POSITIVE RHEUMATOID FACTOR (HCC): Primary | ICD-10-CM

## 2020-11-24 PROCEDURE — 1036F TOBACCO NON-USER: CPT | Performed by: INTERNAL MEDICINE

## 2020-11-24 PROCEDURE — 99214 OFFICE O/P EST MOD 30 MIN: CPT | Performed by: INTERNAL MEDICINE

## 2020-11-24 RX ORDER — UREA 10 %
500 LOTION (ML) TOPICAL DAILY
COMMUNITY

## 2020-11-24 RX ORDER — PYRIDOXINE HCL (VITAMIN B6) 100 MG
100 TABLET ORAL
COMMUNITY

## 2020-11-24 RX ORDER — LANOLIN ALCOHOL/MO/W.PET/CERES
CREAM (GRAM) TOPICAL DAILY
COMMUNITY

## 2020-11-24 RX ORDER — MELATONIN
2000
COMMUNITY

## 2020-11-24 RX ORDER — DIPHENOXYLATE HYDROCHLORIDE AND ATROPINE SULFATE 2.5; .025 MG/1; MG/1
1 TABLET ORAL DAILY
COMMUNITY

## 2020-11-24 RX ORDER — FOLIC ACID 1 MG/1
1 TABLET ORAL DAILY
Qty: 30 TABLET | Refills: 5 | Status: SHIPPED | OUTPATIENT
Start: 2020-11-24 | End: 2021-02-03 | Stop reason: SDUPTHER

## 2020-11-24 RX ORDER — MULTIVITAMIN WITH IRON
1 TABLET ORAL
COMMUNITY
End: 2021-10-04

## 2020-11-24 RX ORDER — ZINC GLUCONATE 50 MG
50 TABLET ORAL DAILY
COMMUNITY

## 2020-11-24 RX ORDER — MULTIVIT WITH MINERALS/LUTEIN
1000 TABLET ORAL DAILY
COMMUNITY

## 2020-11-24 RX ORDER — HYDROXYCHLOROQUINE SULFATE 200 MG/1
200 TABLET, FILM COATED ORAL 2 TIMES DAILY
Qty: 60 TABLET | Refills: 2 | Status: SHIPPED | OUTPATIENT
Start: 2020-11-24 | End: 2021-02-03 | Stop reason: SDUPTHER

## 2020-12-15 DIAGNOSIS — M25.50 ARTHRALGIA, UNSPECIFIED JOINT: ICD-10-CM

## 2020-12-15 RX ORDER — DICLOFENAC SODIUM 75 MG/1
TABLET, DELAYED RELEASE ORAL
Qty: 60 TABLET | Refills: 6 | Status: SHIPPED | OUTPATIENT
Start: 2020-12-15 | End: 2021-02-03 | Stop reason: SDUPTHER

## 2020-12-24 DIAGNOSIS — E55.9 VITAMIN D DEFICIENCY: ICD-10-CM

## 2020-12-24 RX ORDER — ERGOCALCIFEROL 1.25 MG/1
CAPSULE ORAL
Qty: 4 CAPSULE | Refills: 0 | Status: SHIPPED | OUTPATIENT
Start: 2020-12-24 | End: 2021-02-03

## 2021-01-19 DIAGNOSIS — E55.9 VITAMIN D DEFICIENCY: ICD-10-CM

## 2021-01-19 RX ORDER — ERGOCALCIFEROL 1.25 MG/1
CAPSULE ORAL
Qty: 4 CAPSULE | Refills: 0 | OUTPATIENT
Start: 2021-01-19

## 2021-01-20 ENCOUNTER — TELEPHONE (OUTPATIENT)
Dept: OBGYN CLINIC | Facility: HOSPITAL | Age: 51
End: 2021-01-20

## 2021-01-20 DIAGNOSIS — M05.79 RHEUMATOID ARTHRITIS INVOLVING MULTIPLE SITES WITH POSITIVE RHEUMATOID FACTOR (HCC): Primary | ICD-10-CM

## 2021-01-20 RX ORDER — PREDNISONE 10 MG/1
10 TABLET ORAL DAILY
Qty: 30 TABLET | Refills: 0 | Status: SHIPPED | OUTPATIENT
Start: 2021-01-20 | End: 2021-02-03 | Stop reason: SDUPTHER

## 2021-01-20 NOTE — TELEPHONE ENCOUNTER
I spoke with the patient, she has been scheduled with Dr Kristen Nguyễn for 2/3  She is extremely frustrated and doesn't not feel it is right to make her wait another 3 weeks to get in when she is in so much pain  It is difficult for her to function  She is wondering what she is supposed to do in the meantime?

## 2021-01-20 NOTE — TELEPHONE ENCOUNTER
I prescribed her prednisone 10 mg once daily that she can continue until the follow-up appointment  I send this to the pharmacy on file, thanks

## 2021-01-20 NOTE — TELEPHONE ENCOUNTER
I spoke with the patient, she has been made aware of the prednisone being sent to pharmacy  She did apologize for her taking her frustration out on me from our previous call, she is just frustrated and in a lot of pain  She's afraid to walk with the concern of stepping wrong with her swelling/pain in her knees  But knows she needs to work so that puts more strain on her knees worsening the pain/swelling

## 2021-01-20 NOTE — TELEPHONE ENCOUNTER
Patient is calling stating that she is still having pain despite finishing the steroid pack  She is still taking other medications as prescribed but she says they are not helping her anymore  She is wondering what she should do because she does not have an appointment for a few months

## 2021-02-03 ENCOUNTER — OFFICE VISIT (OUTPATIENT)
Dept: RHEUMATOLOGY | Facility: CLINIC | Age: 51
End: 2021-02-03
Payer: COMMERCIAL

## 2021-02-03 VITALS — HEIGHT: 63 IN | BODY MASS INDEX: 33.66 KG/M2 | WEIGHT: 190 LBS

## 2021-02-03 DIAGNOSIS — M25.50 ARTHRALGIA, UNSPECIFIED JOINT: ICD-10-CM

## 2021-02-03 DIAGNOSIS — M05.79 RHEUMATOID ARTHRITIS INVOLVING MULTIPLE SITES WITH POSITIVE RHEUMATOID FACTOR (HCC): Primary | ICD-10-CM

## 2021-02-03 DIAGNOSIS — Z79.899 HIGH RISK MEDICATION USE: ICD-10-CM

## 2021-02-03 DIAGNOSIS — M05.79 RHEUMATOID ARTHRITIS INVOLVING MULTIPLE SITES WITH POSITIVE RHEUMATOID FACTOR (HCC): ICD-10-CM

## 2021-02-03 PROCEDURE — 3008F BODY MASS INDEX DOCD: CPT | Performed by: FAMILY MEDICINE

## 2021-02-03 PROCEDURE — 99215 OFFICE O/P EST HI 40 MIN: CPT | Performed by: INTERNAL MEDICINE

## 2021-02-03 RX ORDER — HYDROXYCHLOROQUINE SULFATE 200 MG/1
200 TABLET, FILM COATED ORAL 2 TIMES DAILY
Qty: 60 TABLET | Refills: 3 | Status: SHIPPED | OUTPATIENT
Start: 2021-02-03 | End: 2021-04-08 | Stop reason: SDUPTHER

## 2021-02-03 RX ORDER — PREDNISONE 10 MG/1
10 TABLET ORAL DAILY PRN
Qty: 30 TABLET | Refills: 2 | Status: SHIPPED | OUTPATIENT
Start: 2021-02-03 | End: 2021-04-14 | Stop reason: ALTCHOICE

## 2021-02-03 RX ORDER — DICLOFENAC SODIUM 75 MG/1
75 TABLET, DELAYED RELEASE ORAL 2 TIMES DAILY WITH MEALS
Qty: 60 TABLET | Refills: 3 | Status: SHIPPED | OUTPATIENT
Start: 2021-02-03 | End: 2021-04-14 | Stop reason: SDUPTHER

## 2021-02-03 RX ORDER — FOLIC ACID 1 MG/1
1 TABLET ORAL DAILY
Qty: 30 TABLET | Refills: 5 | Status: SHIPPED | OUTPATIENT
Start: 2021-02-03 | End: 2021-04-14 | Stop reason: SDUPTHER

## 2021-02-03 NOTE — TELEPHONE ENCOUNTER
Dr Brissa Sapp is calling for clarification on rx for methotrexate        Instructions currently say:  8 tablet po weekly (take all 6 tablets on the same day every week)      [address] [facility] # [phone]

## 2021-02-03 NOTE — PROGRESS NOTES
Assessment and Plan: Laxmi Rayo is a 45 y o   female who presents for follow-up of her seropositive rheumatoid arthritis  Patient's rheumatoid arthritis is not coming under control with continued methotrexate 15 mg p o  weekly and hydroxychloroquine 200 mg p o  b i d  She still needs to take prednisone 10mg p r n  for knee flare ups  Have decided to increase patient's methotrexate to 20 mg PO weekly with daily folic acid  She is also to continue hydroxychloroquine 200 mg p o  b i d , and would like to continue diclofenac 75 mg p o  b i d  Renewed her prednisone 10 mg p o  daily as needed while awaiting for the higher dose of methotrexate to take effect  Patient is to completed CBC/CMP as soon as possible for methotrexate side effect monitoring, along with ESR/CRP for disease activity monitoring  Will reassess how patient is doing in 2 months, and will discuss whether advance in therapy to triple therapy with addition of sulfasalazine, or addition of a biologic agent is needed at that time  Asked patient to bring up her eye floaters issue to her eye doctor; she had a normal baseline eye exam before starting hydroxychloroquine  Plan:  Diagnoses and all orders for this visit:    Rheumatoid arthritis involving multiple sites with positive rheumatoid factor (HCC)  -     predniSONE 10 mg tablet; Take 1 tablet (10 mg total) by mouth daily as needed (joint flare)  -     methotrexate 2 5 mg tablet; 8 tablet po weekly (take all 6 tablets on the same day every week)  -     folic acid (FOLVITE) 1 mg tablet; Take 1 tablet (1 mg total) by mouth daily  -     hydroxychloroquine (PLAQUENIL) 200 mg tablet; Take 1 tablet (200 mg total) by mouth 2 (two) times a day    High risk medication use - Benefits and risks of hydroxychloroquine, including but not limited to retinal toxicity, corneal deposits, gastrointestinal side effects, and headaches were previously discussed with the patient   She is aware of the need for a regular eye exam to monitor for ocular toxicity while on this medication  Benefits and risks of methotrexate use, including but not limited to gastrointestinal disturbances such as diarrhea, hair loss, fatigue, stomatitis, leukopenia, lung hypersensitivity reaction, hepatotoxicity, and lymphoma were discussed with the patient  Baseline viral hepatitis panel was ordered and returned negative  CBC,CMP will be regularly monitored  Patient does not plan on having any children  Patient was prescribed Folic Acid 1 mg po daily to take while on methotrexate to help prevent side effects  Patient counseled on abstinence from alcohol while using methotrexate  -     folic acid (FOLVITE) 1 mg tablet; Take 1 tablet (1 mg total) by mouth daily    Arthralgia, unspecified joint  -     diclofenac (VOLTAREN) 75 mg EC tablet; Take 1 tablet (75 mg total) by mouth 2 (two) times a day with meals    Follow-up plan: Return to clinic in 2 months      Rheumatic Disease Summary:  Mikhail Caban a 45 y  o  female who originally presented on 5/13/20 via telemedicine to establish care for her newly diagnosed seropositive (RF+, anti-CCP+) Rheumatoid arthritis   Patient had migratory early RA symptoms, which significantly improved with prednisone   Asked her to continue her prednisone course prescribed by PCP   Initiated her on DMARD therapy with weight-based hydroxychloroquine 200mg po bid; asked patient to get regular eye exams while on this medication to monitor for Plaquenil-related retinal toxicity   Ophthalmology referral made per patient's request  Haley Gloria chose this more benign DMARD therapy over starting methotrexate during this time in the pandemic   Ordered hand x-rays for patient to do when she gets the chance to have her baseline   Asked patient to call or message clinic with any RA flare symptoms       She presented for follow-up on 08/19/2020    At that time, her RA had not come under control with three months of hydroxychloroquine alone, so added on methotrexate 15mg po weekly with daily folic acid  Bilateral hand x-rays returned unremarkable  Since naproxen and meloxicam had not helped patient's breakthrough joint pain in the past, prescribed diclofenac 75mg po bid prn instead  Kenalog 80mg IM steroid injection administered in clinic to treat her active RA symptoms, which had been affecting her ability to work; filled out [employer] paperwork in clinic (patient works as a prison cook)  She then presented on 11/24/2020 to Dr Mack Lopez for follow-up  Was having flare-ups with her right knee, and was put on prednisone p r n  at the time  No other medication changes were made  HPI   Yue Yu is a 45 y o   female who presents for follow-up of her seropositive rheumatoid arthritis  Patient feels that methotrexate has significantly helped her hand arthritis symptoms, but not her knee arthritis symptoms - alternates between knees  She continues taking methotrexate 15 mg PO weekly with daily folic acid; also has been taking hydroxychloroquine 200 mg p o  b i d  and diclofenac 75 mg p o  b i d  regularly  Prednisone does help her knee flare ups however  She has been taking prednisone 10 mg as needed, does not take it more than once a day  She admits to knee morning stiffness for 1 hour  Her last eye exam was last year; admits to some floaters lately  The following portions of the patient's history were reviewed and updated as appropriate: allergies, current medications, past family history, past medical history, past social history, past surgical history and problem list     Review of Systems:   Review of Systems   Constitutional: Negative for fatigue and unexpected weight change  HENT: Negative for mouth sores  Respiratory: Negative for cough and shortness of breath  Gastrointestinal: Negative for constipation and diarrhea  Musculoskeletal: Positive for arthralgias and joint swelling   Negative for back pain and myalgias  Skin: Negative for color change and rash  Neurological: Negative for weakness  Psychiatric/Behavioral: Negative for sleep disturbance  Home Medications:     Current Outpatient Medications:     Acetaminophen (TYLENOL ARTHRITIS PAIN PO), Take by mouth, Disp: , Rfl:     Ascorbic Acid (vitamin C) 1000 MG tablet, Take 1,000 mg by mouth daily, Disp: , Rfl:     cholecalciferol (VITAMIN D3) 1,000 units tablet, Take 2,000 Units by mouth, Disp: , Rfl:     diclofenac (VOLTAREN) 75 mg EC tablet, Take 1 tablet (75 mg total) by mouth 2 (two) times a day with meals, Disp: 60 tablet, Rfl: 3    folic acid (FOLVITE) 1 mg tablet, Take 1 tablet (1 mg total) by mouth daily, Disp: 30 tablet, Rfl: 5    hydroxychloroquine (PLAQUENIL) 200 mg tablet, Take 1 tablet (200 mg total) by mouth 2 (two) times a day, Disp: 60 tablet, Rfl: 3    magnesium gluconate (MAGONATE) 500 mg tablet, Take 500 mg by mouth daily, Disp: , Rfl:     methotrexate 2 5 mg tablet, 8 tablet po weekly (take all 6 tablets on the same day every week), Disp: 40 tablet, Rfl: 3    multivitamin (THERAGRAN) TABS, Take 1 tablet by mouth daily, Disp: , Rfl:     Omega-3 Fatty Acids (Fish Oil Concentrate) 300 MG CAPS, Take 1 capsule by mouth, Disp: , Rfl:     predniSONE 10 mg tablet, Take 1 tablet (10 mg total) by mouth daily as needed (joint flare), Disp: 30 tablet, Rfl: 2    pyridoxine (B-6) 100 MG tablet, Take 100 mg by mouth, Disp: , Rfl:     TRAMADOL HCL PO, Take by mouth 50mg as needed, Disp: , Rfl:     vitamin B-12 (VITAMIN B-12) 1,000 mcg tablet, Take by mouth daily, Disp: , Rfl:     zinc gluconate 50 mg tablet, Take 50 mg by mouth daily, Disp: , Rfl:     Objective:    Vitals:    02/03/21 0928   Weight: 86 2 kg (190 lb)   Height: 5' 3" (1 6 m)       Physical Exam  Constitutional:       General: She is not in acute distress  Appearance: She is well-developed  HENT:      Head: Normocephalic and atraumatic     Eyes:      General: Lids are normal  No scleral icterus  Conjunctiva/sclera: Conjunctivae normal    Neck:      Musculoskeletal: Neck supple  No muscular tenderness  Cardiovascular:      Rate and Rhythm: Normal rate and regular rhythm  Heart sounds: S1 normal and S2 normal  No murmur  No friction rub  Pulmonary:      Effort: Pulmonary effort is normal  No tachypnea or respiratory distress  Breath sounds: Normal breath sounds  No wheezing, rhonchi or rales  Musculoskeletal:         General: Tenderness present  Comments: Right 4th PIP tenderness, right knee tenderness; bilateral hand warmth   Skin:     General: Skin is warm and dry  Findings: No rash  Nails: There is no clubbing  Neurological:      Mental Status: She is alert  Sensory: No sensory deficit  Psychiatric:         Behavior: Behavior normal  Behavior is cooperative  Reviewed labs and imaging  Imaging:   Bilateral Hand x-rays 8/20/20: unremarkable    Labs:   Orders Only on 10/22/2020   Component Date Value Ref Range Status    SARS-CoV-2  10/22/2020 Not Detected  Not Detected Final    Comment: This nucleic acid amplification test was developed and its performance  characteristics determined by [facility]  Nucleic acid  amplification tests include PCR and TMA  This test has not been FDA  cleared or approved  This test has been authorized by FDA under an  Emergency Use Authorization (EUA)  This test is only authorized for  the duration of time the declaration that circumstances exist  justifying the authorization of the emergency use of in vitro  diagnostic tests for detection of SARS-CoV-2 virus and/or diagnosis  of COVID-19 infection under section 564(b)(1) of the Act, 21 U  S C   376IUW-3(K) (1), unless the authorization is terminated or revoked  sooner    When diagnostic testing is negative, the possibility of a false  negative result should be considered in the context of a patient's  recent exposures and the presence of clinical signs and symptoms  consistent with COVID-19  An individual without symptoms of COVID-19  and who is not shedding SARS-CoV-2 virus would                            expect to have a  negative (not detected) result in this assay     Office Visit on 08/19/2020   Component Date Value Ref Range Status    WBC 09/24/2020 8 02  4 31 - 10 16 Thousand/uL Final    RBC 09/24/2020 4 24  3 81 - 5 12 Million/uL Final    Hemoglobin 09/24/2020 12 4  11 5 - 15 4 g/dL Final    Hematocrit 09/24/2020 38 5  34 8 - 46 1 % Final    MCV 09/24/2020 91  82 - 98 fL Final    MCH 09/24/2020 29 2  26 8 - 34 3 pg Final    MCHC 09/24/2020 32 2  31 4 - 37 4 g/dL Final    RDW 09/24/2020 13 9  11 6 - 15 1 % Final    MPV 09/24/2020 9 1  8 9 - 12 7 fL Final    Platelets 63/52/1372 358  149 - 390 Thousands/uL Final    nRBC 09/24/2020 0  /100 WBCs Final    Neutrophils Relative 09/24/2020 66  43 - 75 % Final    Immat GRANS % 09/24/2020 0  0 - 2 % Final    Lymphocytes Relative 09/24/2020 25  14 - 44 % Final    Monocytes Relative 09/24/2020 7  4 - 12 % Final    Eosinophils Relative 09/24/2020 1  0 - 6 % Final    Basophils Relative 09/24/2020 1  0 - 1 % Final    Neutrophils Absolute 09/24/2020 5 29  1 85 - 7 62 Thousands/µL Final    Immature Grans Absolute 09/24/2020 0 03  0 00 - 0 20 Thousand/uL Final    Lymphocytes Absolute 09/24/2020 2 01  0 60 - 4 47 Thousands/µL Final    Monocytes Absolute 09/24/2020 0 55  0 17 - 1 22 Thousand/µL Final    Eosinophils Absolute 09/24/2020 0 10  0 00 - 0 61 Thousand/µL Final    Basophils Absolute 09/24/2020 0 04  0 00 - 0 10 Thousands/µL Final    Sodium 09/24/2020 140  136 - 145 mmol/L Final    Potassium 09/24/2020 3 9  3 5 - 5 3 mmol/L Final    Chloride 09/24/2020 105  100 - 108 mmol/L Final    CO2 09/24/2020 26  21 - 32 mmol/L Final    ANION GAP 09/24/2020 9  4 - 13 mmol/L Final    BUN 09/24/2020 17  5 - 25 mg/dL Final    Creatinine 09/24/2020 0 59* 0 60 - 1 30 mg/dL Final Standardized to IDMS reference method    Glucose 09/24/2020 79  65 - 140 mg/dL Final    If the patient is fasting, the ADA then defines impaired fasting glucose as > 100 mg/dL and diabetes as > or equal to 123 mg/dL  Specimen collection should occur prior to Sulfasalazine administration due to the potential for falsely depressed results  Specimen collection should occur prior to Sulfapyridine administration due to the potential for falsely elevated results   Calcium 09/24/2020 9 1  8 3 - 10 1 mg/dL Final    AST 09/24/2020 13  5 - 45 U/L Final    Specimen collection should occur prior to Sulfasalazine administration due to the potential for falsely depressed results   ALT 09/24/2020 30  12 - 78 U/L Final    Specimen collection should occur prior to Sulfasalazine administration due to the potential for falsely depressed results   Alkaline Phosphatase 09/24/2020 56  46 - 116 U/L Final    Total Protein 09/24/2020 7 6  6 4 - 8 2 g/dL Final    Albumin 09/24/2020 3 9  3 5 - 5 0 g/dL Final    Total Bilirubin 09/24/2020 0 38  0 20 - 1 00 mg/dL Final    Use of this assay is not recommended for patients undergoing treatment with eltrombopag due to the potential for falsely elevated results   eGFR 09/24/2020 106  ml/min/1 73sq m Final    CRP 09/24/2020 <3 0  <3 0 mg/L Final    Sed Rate 09/24/2020 11  0 - 29 mm/hour Final    Hepatitis B Surface Ag 09/24/2020 Non-reactive  Non-reactive, NonReactive - Confirmed Final    Hepatitis C Ab 09/24/2020 Non-reactive  Non-reactive Final    Hep B C IgM 09/24/2020 Non-reactive  Non-reactive Final    Hep B Core Total Ab 09/24/2020 Non-reactive  Non-reactive Final    Vit D, 25-Hydroxy 09/24/2020 16 6* 30 0 - 100 0 ng/mL Final   Orders Only on 08/13/2020   Component Date Value Ref Range Status    SARS-CoV-2  08/13/2020 Not Detected  Not Detected Final    This test was developed and its performance characteristics determined  by [facility]   This test has not been FDA cleared or  approved  This test has been authorized by FDA under an Emergency Use  Authorization (EUA)  This test is only authorized for the duration of  time the declaration that circumstances exist justifying the  authorization of the emergency use of in vitro diagnostic tests for  detection of SARS-CoV-2 virus and/or diagnosis of COVID-19 infection  under section 564(b)(1) of the Act, 21 U  S C  778ZLV-3(J)(3), unless  the authorization is terminated or revoked sooner  When diagnostic testing is negative, the possibility of a false  negative result should be considered in the context of a patient's  recent exposures and the presence of clinical signs and symptoms  consistent with COVID-19  An individual without symptoms of COVID-19  and who is not shedding SARS-CoV-2 virus would expect to have a  negative (not detected) result in this assay

## 2021-02-03 NOTE — PATIENT INSTRUCTIONS
Do labs as soon as possible  Continue same medications, except increase methotrexate to 8 tabs once a week    Return to clinic in 2 months    Rheumatoid Arthritis   AMBULATORY CARE:   Rheumatoid arthritis  is a long-term autoimmune disease that causes inflammation and damage to your joints  RA causes your body's immune system to attack the synovial membrane (lining) in your joints  RA can also affect other organs, such as your eyes, heart, or lungs  It may also increase your risk of osteoporosis (weakened bones)  Common symptoms include the following:   · Joint pain and stiffness that lasts longer than 1 hour    · Swollen joints in the same joint on both sides of your body    · Loss of joint movement    · Firm, round nodules (growths) on your joints    · Fatigue or muscle weakness    · Loss of appetite or weight loss    Call your doctor or rheumatologist if:   · You have a fever  · You have increased joint swelling, pain, or redness  · Your skin is itchy, swollen, or has a rash  · Your symptoms are getting worse, even with treatment  · You have questions or concerns about your condition or care  Treatment:  The goal of treatment within the first year is remission (no pain or inflammation)  If full remission cannot be reached, the goal is as few arthritis flares as possible  Early treatment can also help prevent or slow joint damage  Treatment may change after the first year, depending on how your body responds  · Antirheumatics  help slow the progress of RA, and reduce pain, stiffness, and inflammation  · NSAIDs , such as ibuprofen, help decrease swelling, pain, and fever  This medicine is available with or without a doctor's order  NSAIDs can cause stomach bleeding or kidney problems in certain people  If you take blood thinner medicine, always ask your healthcare provider if NSAIDs are safe for you  Always read the medicine label and follow directions      · Steroids  help decrease inflammation  · Biologic therapy  helps decrease joint swelling, pain, and stiffness  These medicines increase the risk of serious infection and need careful monitoring  · Surgery  may be needed to remove all or part of your joint  An implant may be placed to help reduce pain and repair the joint  Manage your symptoms:   · Rest when needed  Rest is important if your joints are painful  Limit your activities until your symptoms improve  Gradually start your normal activities when you can do them without pain  Avoid motions and activities that cause strain on your joints, such as heavy exercise and lifting  · Use ice or heat  Both can help decrease swelling and pain  Ice may also help prevent tissue damage  Use an ice pack, or put crushed ice in a plastic bag  Cover it with a towel and place it on your joint for 15 to 20 minutes every hour or as directed  You can apply heat for 20 minutes every 2 hours  Heat treatment includes hot packs, heat lamps, warm baths, or showers  · Elevate your joint  Elevation helps reduce swelling and pain  Raise your joint above the level of your heart as often as you can  Prop your painful joint on pillows to keep it above your heart comfortably  Manage RA:   · Talk to your healthcare providers about your arthritis medicines  Some medicines may only be needed when you have arthritis pain  You may need to take other medicines every day to prevent arthritis from getting worse  Your healthcare providers will help you understand all your medicines and when to take them  It is important to take the medicines as directed, even if you start to feel better  You can continue to have joint damage and inflammation even if you do not feel it  · Eat a variety of healthy foods  Healthy foods include fruits, vegetables, whole-grain breads, low-fat dairy products, beans, lean meats, and fish  Ask if you need to be on a special diet   A diet rich in calcium and vitamin D may decrease your risk of osteoporosis  Foods high in calcium include milk, cheese, broccoli, and tofu  Vitamin D may be found in meat, fish, fortified milk, cereal and bread  Ask if you need calcium or vitamin D supplements  · Maintain a healthy weight  This may help decrease strain on joints in your back, knees, ankles, and feet  Ask your healthcare provider how much you should weigh  Ask him or her to help you create a weight loss plan if you are overweight  Exercise can help you maintain a healthy weight  · Go to physical or occupational therapy as directed  Physical activity can help relieve pain and stiffness  A physical therapist can teach you exercises to improve flexibility and range of motion  You may also be shown non-weight-bearing exercises that are safe for your joints, such as swimming  An occupational therapist can help you learn to do your daily activities when your joints are stiff or sore  · Do not smoke  Nicotine and other chemicals in cigarettes and cigars can damage your bones and joints  Ask your healthcare provider for information if you currently smoke and need help to quit  E-cigarettes or smokeless tobacco still contain nicotine  Talk to your healthcare provider before you use these products  RA medicines and pregnancy:   · Men:  Talk to your doctor about your RA and the medicines you take  Some medicines may keep your partner from getting pregnant  Talk to your doctor if you and your partner are discussing pregnancy  · Women:  Talk to your gynecologist about contraceptives  Tell him or her about your RA and what medicines you take  He or she will tell you what the best contraceptive for will be  Not all contraceptives are effective if you have RA and are taking certain medicines  You may not be able to breastfeed if you are taking certain RA medicines      Support devices to help manage arthritis:   · Orthotic shoes or insoles  help support your feet when you walk     · Crutches, a cane, or a walker  may help decrease your risk for falling  They also decrease stress on affected joints  · Devices to prevent falls  include raised toilet seats and bathtub bars to help you get up from sitting  Handrails can be placed in areas where you need balance and support  · Devices to help with support and rest  include splints to wear on your hands and a firm pillow while you sleep  Use a pillow that is firm enough to support your neck and head  Ask your healthcare provider about vaccines:  RA or its treatment may increase your risk for infections  Vaccines can help protect you from infections caused by certain bacteria or viruses  Follow up with your healthcare provider as directed:  Write down your questions so you remember to ask them during your visits  © Copyright [address] Information is for End User's use only and may not be sold, redistributed or otherwise used for commercial purposes  All illustrations and images included in CareNotes® are the copyrighted property of [employer]  or [facility]   The above information is an  only  It is not intended as medical advice for individual conditions or treatments  Talk to your doctor, nurse or pharmacist before following any medical regimen to see if it is safe and effective for you

## 2021-02-03 NOTE — TELEPHONE ENCOUNTER
Please clarify with them that I officially increased the dose to 8 tabs once a week, I just sent them the new prescription      Thanks,  HM

## 2021-02-10 ENCOUNTER — APPOINTMENT (OUTPATIENT)
Dept: LAB | Facility: CLINIC | Age: 51
End: 2021-02-10
Payer: COMMERCIAL

## 2021-02-10 DIAGNOSIS — E55.9 VITAMIN D DEFICIENCY: ICD-10-CM

## 2021-02-10 DIAGNOSIS — Z79.899 METHOTREXATE, LONG TERM, CURRENT USE: ICD-10-CM

## 2021-02-10 LAB
25(OH)D3 SERPL-MCNC: 23.9 NG/ML (ref 30–100)
ALBUMIN SERPL BCP-MCNC: 3.7 G/DL (ref 3.5–5)
ALP SERPL-CCNC: 58 U/L (ref 46–116)
ALT SERPL W P-5'-P-CCNC: 58 U/L (ref 12–78)
ANION GAP SERPL CALCULATED.3IONS-SCNC: 6 MMOL/L (ref 4–13)
AST SERPL W P-5'-P-CCNC: 23 U/L (ref 5–45)
BASOPHILS # BLD AUTO: 0.05 THOUSANDS/ΜL (ref 0–0.1)
BASOPHILS NFR BLD AUTO: 1 % (ref 0–1)
BILIRUB SERPL-MCNC: 0.28 MG/DL (ref 0.2–1)
BUN SERPL-MCNC: 17 MG/DL (ref 5–25)
CALCIUM SERPL-MCNC: 9 MG/DL (ref 8.3–10.1)
CHLORIDE SERPL-SCNC: 108 MMOL/L (ref 100–108)
CO2 SERPL-SCNC: 27 MMOL/L (ref 21–32)
CREAT SERPL-MCNC: 0.73 MG/DL (ref 0.6–1.3)
CRP SERPL QL: 4.6 MG/L
EOSINOPHIL # BLD AUTO: 0.16 THOUSAND/ΜL (ref 0–0.61)
EOSINOPHIL NFR BLD AUTO: 3 % (ref 0–6)
ERYTHROCYTE [DISTWIDTH] IN BLOOD BY AUTOMATED COUNT: 12.9 % (ref 11.6–15.1)
ERYTHROCYTE [SEDIMENTATION RATE] IN BLOOD: 13 MM/HOUR (ref 0–29)
GFR SERPL CREATININE-BSD FRML MDRD: 95 ML/MIN/1.73SQ M
GLUCOSE P FAST SERPL-MCNC: 87 MG/DL (ref 65–99)
HCT VFR BLD AUTO: 41.1 % (ref 34.8–46.1)
HGB BLD-MCNC: 13.4 G/DL (ref 11.5–15.4)
IMM GRANULOCYTES # BLD AUTO: 0.01 THOUSAND/UL (ref 0–0.2)
IMM GRANULOCYTES NFR BLD AUTO: 0 % (ref 0–2)
LYMPHOCYTES # BLD AUTO: 1.62 THOUSANDS/ΜL (ref 0.6–4.47)
LYMPHOCYTES NFR BLD AUTO: 26 % (ref 14–44)
MCH RBC QN AUTO: 30.9 PG (ref 26.8–34.3)
MCHC RBC AUTO-ENTMCNC: 32.6 G/DL (ref 31.4–37.4)
MCV RBC AUTO: 95 FL (ref 82–98)
MONOCYTES # BLD AUTO: 0.46 THOUSAND/ΜL (ref 0.17–1.22)
MONOCYTES NFR BLD AUTO: 7 % (ref 4–12)
NEUTROPHILS # BLD AUTO: 3.91 THOUSANDS/ΜL (ref 1.85–7.62)
NEUTS SEG NFR BLD AUTO: 63 % (ref 43–75)
NRBC BLD AUTO-RTO: 0 /100 WBCS
PLATELET # BLD AUTO: 332 THOUSANDS/UL (ref 149–390)
PMV BLD AUTO: 9.5 FL (ref 8.9–12.7)
POTASSIUM SERPL-SCNC: 5.4 MMOL/L (ref 3.5–5.3)
PROT SERPL-MCNC: 7.1 G/DL (ref 6.4–8.2)
RBC # BLD AUTO: 4.33 MILLION/UL (ref 3.81–5.12)
SODIUM SERPL-SCNC: 141 MMOL/L (ref 136–145)
WBC # BLD AUTO: 6.21 THOUSAND/UL (ref 4.31–10.16)

## 2021-02-10 PROCEDURE — 80053 COMPREHEN METABOLIC PANEL: CPT

## 2021-02-10 PROCEDURE — 36415 COLL VENOUS BLD VENIPUNCTURE: CPT

## 2021-02-10 PROCEDURE — 82306 VITAMIN D 25 HYDROXY: CPT

## 2021-02-10 PROCEDURE — 86140 C-REACTIVE PROTEIN: CPT

## 2021-02-10 PROCEDURE — 85652 RBC SED RATE AUTOMATED: CPT

## 2021-02-10 PROCEDURE — 85025 COMPLETE CBC W/AUTO DIFF WBC: CPT

## 2021-03-03 ENCOUNTER — OFFICE VISIT (OUTPATIENT)
Dept: FAMILY MEDICINE CLINIC | Facility: CLINIC | Age: 51
End: 2021-03-03
Payer: COMMERCIAL

## 2021-03-03 VITALS
HEART RATE: 97 BPM | BODY MASS INDEX: 35.39 KG/M2 | WEIGHT: 199.8 LBS | SYSTOLIC BLOOD PRESSURE: 138 MMHG | RESPIRATION RATE: 18 BRPM | TEMPERATURE: 96.8 F | OXYGEN SATURATION: 96 % | DIASTOLIC BLOOD PRESSURE: 70 MMHG

## 2021-03-03 DIAGNOSIS — M05.79 RHEUMATOID ARTHRITIS INVOLVING MULTIPLE SITES WITH POSITIVE RHEUMATOID FACTOR (HCC): ICD-10-CM

## 2021-03-03 DIAGNOSIS — M54.42 ACUTE LEFT-SIDED LOW BACK PAIN WITH LEFT-SIDED SCIATICA: Primary | ICD-10-CM

## 2021-03-03 PROCEDURE — 99213 OFFICE O/P EST LOW 20 MIN: CPT | Performed by: FAMILY MEDICINE

## 2021-03-03 RX ORDER — PREDNISONE 10 MG/1
TABLET ORAL
Qty: 27 TABLET | Refills: 0 | Status: SHIPPED | OUTPATIENT
Start: 2021-03-03 | End: 2021-03-12

## 2021-03-03 RX ORDER — CYCLOBENZAPRINE HCL 5 MG
5 TABLET ORAL 3 TIMES DAILY PRN
Qty: 40 TABLET | Refills: 1 | Status: SHIPPED | OUTPATIENT
Start: 2021-03-03 | End: 2021-10-04

## 2021-03-03 RX ORDER — OXYCODONE HYDROCHLORIDE 5 MG/1
5 TABLET ORAL EVERY 6 HOURS PRN
Qty: 20 TABLET | Refills: 0 | Status: SHIPPED | OUTPATIENT
Start: 2021-03-03 | End: 2021-10-04

## 2021-03-03 NOTE — PROGRESS NOTES
FAMILY PRACTICE OFFICE VISIT       NAME: Inez Lipscomb  AGE: 45 y o  SEX: female       : 1970        MRN: [id number]    DATE: 3/3/2021  TIME: 5:37 PM    Assessment and Plan   1  Acute left-sided low back pain with left-sided sciatica  Comments:  Pt uncomfortable, but stable on exam  Treat - Pred taper, heat to the region, OTC Tylenol PRN, Oxycodone PRN, stretch, Cyclobenzaprine PRN spasm  Reassess  Orders:  -     oxyCODONE (ROXICODONE) 5 mg immediate release tablet; Take 1 tablet (5 mg total) by mouth every 6 (six) hours as needed for moderate pain or severe painMax Daily Amount: 20 mg  -     cyclobenzaprine (FLEXERIL) 5 mg tablet; Take 1 tablet (5 mg total) by mouth 3 (three) times a day as needed for muscle spasms  -     predniSONE 10 mg tablet; Take 4 tablets (40 mg total) by mouth daily for 3 days, THEN 3 tablets (30 mg total) daily for 3 days, THEN 2 tablets (20 mg total) daily for 3 days  2  Rheumatoid arthritis involving multiple sites with positive rheumatoid factor (HCC)  Comments:  Pt on daily Prednisone -> increasing Prednisone for her back, and starting a taper -> she will taper down to her baseline 10mg QD  There are no Patient Instructions on file for this visit  Chief Complaint     Chief Complaint   Patient presents with    Follow-up     Patient is here with left leg pain and numbness       History of Present Illness   Inez Lipscomb is a 45y o -year-old female who presents with 1+ week of left low back pain, with radicular pain to the posterior thigh, and numbness to the lateral foot  Pt had been shoveling snow, and then was working on her floors in her home  Had one fall on the ice as well  No hx of back surgery  No loss of bowel / bladder control  On 10mg of Prednisone daily due to her RA  No abd pain  No fevers  Review of Systems   Review of Systems   Constitutional: Negative for activity change and fever  Musculoskeletal: Positive for back pain  Neurological: Positive for numbness  +Sciatica to the left leg  Active Problem List     Patient Active Problem List   Diagnosis    Rheumatoid arthritis involving multiple sites with positive rheumatoid factor (HCC)    High risk medication use    Arthritis    Calcific tendinitis    Cervical pain         Past Medical History:  Past Medical History:   Diagnosis Date    Anxiety     Depression        Past Surgical History:  Past Surgical History:   Procedure Laterality Date    ROTATOR CUFF REPAIR         Family History:  History reviewed  No pertinent family history      Social History:  Social History     Socioeconomic History    Marital status: Single     Spouse name: Not on file    Number of children: Not on file    Years of education: Not on file    Highest education level: Not on file   Occupational History    Not on file   Social Needs    Financial resource strain: Not on file    Food insecurity     Worry: Not on file     Inability: Not on file    Transportation needs     Medical: Not on file     Non-medical: Not on file   Tobacco Use    Smoking status: Former Smoker    Smokeless tobacco: Current User    Tobacco comment: occasional vaping use   Substance and Sexual Activity    Alcohol use: Not Currently     Binge frequency: Less than monthly    Drug use: Never    Sexual activity: Not on file   Lifestyle    Physical activity     Days per week: Not on file     Minutes per session: Not on file    Stress: Not on file   Relationships    Social connections     Talks on phone: Not on file     Gets together: Not on file     Attends Worship service: Not on file     Active member of club or organization: Not on file     Attends meetings of clubs or organizations: Not on file     Relationship status: Not on file    Intimate partner violence     Fear of current or ex partner: Not on file     Emotionally abused: Not on file     Physically abused: Not on file     Forced sexual activity: Not on file   Other Topics Concern    Not on file   Social History Narrative    Not on file       Objective     Vitals:    03/03/21 1404   BP: 138/70   Pulse: 97   Resp: 18   Temp: (!) 96 8 °F (36 °C)   SpO2: 96%     Wt Readings from Last 3 Encounters:   03/03/21 90 6 kg (199 lb 12 8 oz)   02/03/21 86 2 kg (190 lb)   10/21/20 86 2 kg (190 lb)       Physical Exam  Vitals signs and nursing note reviewed  Constitutional:       General: She is in acute distress  Appearance: Normal appearance  She is not ill-appearing, toxic-appearing or diaphoretic  Comments: Pt very uncomfortable due to her back today; is speaking in full sentences  HENT:      Head: Normocephalic and atraumatic  Eyes:      General: No scleral icterus  Conjunctiva/sclera: Conjunctivae normal    Musculoskeletal:        Back:    Neurological:      Mental Status: She is alert and oriented to person, place, and time  Sensory: Sensory deficit present  Deep Tendon Reflexes:      Reflex Scores:       Patellar reflexes are 2+ on the left side  Achilles reflexes are 1+ on the left side  Comments: MS +5/5 at the left hip and knee, and with plantar flexion of the foot; +4/5 with dorsiflexion of the left foot  Full sensation at the left great toe; decreased at the lateral aspect of the foot  Psychiatric:         Mood and Affect: Mood normal          Behavior: Behavior normal          Thought Content:  Thought content normal          Judgment: Judgment normal          Pertinent Laboratory/Diagnostic Studies:  Lab Results   Component Value Date    BUN 17 02/10/2021    CREATININE 0 73 02/10/2021    CALCIUM 9 0 02/10/2021    K 5 4 (H) 02/10/2021    CO2 27 02/10/2021     02/10/2021     Lab Results   Component Value Date    ALT 58 02/10/2021    AST 23 02/10/2021    ALKPHOS 58 02/10/2021       Lab Results   Component Value Date    WBC 6 21 02/10/2021    HGB 13 4 02/10/2021    HCT 41 1 02/10/2021    MCV 95 02/10/2021  02/10/2021       No results found for: TSH    No results found for: CHOL  Lab Results   Component Value Date    TRIG 85 05/07/2020     Lab Results   Component Value Date    HDL 54 05/07/2020     Lab Results   Component Value Date    LDLCALC 114 (H) 05/07/2020     No results found for: HGBA1C    Results for orders placed or performed in visit on 02/10/21   CBC and differential   Result Value Ref Range    WBC 6 21 4 31 - 10 16 Thousand/uL    RBC 4 33 3 81 - 5 12 Million/uL    Hemoglobin 13 4 11 5 - 15 4 g/dL    Hematocrit 41 1 34 8 - 46 1 %    MCV 95 82 - 98 fL    MCH 30 9 26 8 - 34 3 pg    MCHC 32 6 31 4 - 37 4 g/dL    RDW 12 9 11 6 - 15 1 %    MPV 9 5 8 9 - 12 7 fL    Platelets 484 741 - 974 Thousands/uL    nRBC 0 /100 WBCs    Neutrophils Relative 63 43 - 75 %    Immat GRANS % 0 0 - 2 %    Lymphocytes Relative 26 14 - 44 %    Monocytes Relative 7 4 - 12 %    Eosinophils Relative 3 0 - 6 %    Basophils Relative 1 0 - 1 %    Neutrophils Absolute 3 91 1 85 - 7 62 Thousands/µL    Immature Grans Absolute 0 01 0 00 - 0 20 Thousand/uL    Lymphocytes Absolute 1 62 0 60 - 4 47 Thousands/µL    Monocytes Absolute 0 46 0 17 - 1 22 Thousand/µL    Eosinophils Absolute 0 16 0 00 - 0 61 Thousand/µL    Basophils Absolute 0 05 0 00 - 0 10 Thousands/µL   Comprehensive metabolic panel   Result Value Ref Range    Sodium 141 136 - 145 mmol/L    Potassium 5 4 (H) 3 5 - 5 3 mmol/L    Chloride 108 100 - 108 mmol/L    CO2 27 21 - 32 mmol/L    ANION GAP 6 4 - 13 mmol/L    BUN 17 5 - 25 mg/dL    Creatinine 0 73 0 60 - 1 30 mg/dL    Glucose, Fasting 87 65 - 99 mg/dL    Calcium 9 0 8 3 - 10 1 mg/dL    AST 23 5 - 45 U/L    ALT 58 12 - 78 U/L    Alkaline Phosphatase 58 46 - 116 U/L    Total Protein 7 1 6 4 - 8 2 g/dL    Albumin 3 7 3 5 - 5 0 g/dL    Total Bilirubin 0 28 0 20 - 1 00 mg/dL    eGFR 95 ml/min/1 73sq m   C-reactive protein   Result Value Ref Range    CRP 4 6 (H) <3 0 mg/L   Sedimentation rate, automated   Result Value Ref Range Sed Rate 13 0 - 29 mm/hour   Vitamin D 25 hydroxy   Result Value Ref Range    Vit D, 25-Hydroxy 23 9 (L) 30 0 - 100 0 ng/mL       No orders of the defined types were placed in this encounter        ALLERGIES:  Allergies   Allergen Reactions    Clarithromycin GI Intolerance       Current Medications     Current Outpatient Medications   Medication Sig Dispense Refill    Acetaminophen (TYLENOL ARTHRITIS PAIN PO) Take by mouth      Ascorbic Acid (vitamin C) 1000 MG tablet Take 1,000 mg by mouth daily      cholecalciferol (VITAMIN D3) 1,000 units tablet Take 2,000 Units by mouth      diclofenac (VOLTAREN) 75 mg EC tablet Take 1 tablet (75 mg total) by mouth 2 (two) times a day with meals 60 tablet 3    folic acid (FOLVITE) 1 mg tablet Take 1 tablet (1 mg total) by mouth daily 30 tablet 5    hydroxychloroquine (PLAQUENIL) 200 mg tablet Take 1 tablet (200 mg total) by mouth 2 (two) times a day 60 tablet 3    magnesium gluconate (MAGONATE) 500 mg tablet Take 500 mg by mouth daily      methotrexate 2 5 mg tablet 8 tablet po weekly (take all 8 tablets on the same day every week) 40 tablet 3    multivitamin (THERAGRAN) TABS Take 1 tablet by mouth daily      Omega-3 Fatty Acids (Fish Oil Concentrate) 300 MG CAPS Take 1 capsule by mouth      predniSONE 10 mg tablet Take 1 tablet (10 mg total) by mouth daily as needed (joint flare) 30 tablet 2    pyridoxine (B-6) 100 MG tablet Take 100 mg by mouth      TRAMADOL HCL PO Take by mouth 50mg as needed      vitamin B-12 (VITAMIN B-12) 1,000 mcg tablet Take by mouth daily      zinc gluconate 50 mg tablet Take 50 mg by mouth daily      cyclobenzaprine (FLEXERIL) 5 mg tablet Take 1 tablet (5 mg total) by mouth 3 (three) times a day as needed for muscle spasms 40 tablet 1    oxyCODONE (ROXICODONE) 5 mg immediate release tablet Take 1 tablet (5 mg total) by mouth every 6 (six) hours as needed for moderate pain or severe painMax Daily Amount: 20 mg 20 tablet 0    predniSONE 10 mg tablet Take 4 tablets (40 mg total) by mouth daily for 3 days, THEN 3 tablets (30 mg total) daily for 3 days, THEN 2 tablets (20 mg total) daily for 3 days  27 tablet 0     No current facility-administered medications for this visit  Health Maintenance     Health Maintenance   Topic Date Due    MAMMOGRAM  1970    Cervical Cancer Screening  01/15/1990    Colorectal Cancer Screening  01/15/2019    Annual Physical  03/11/2021    BMI: Followup Plan  08/10/2021    HIV Screening  04/03/2021 (Originally 1/15/1984)    Influenza Vaccine (1) 06/30/2021 (Originally 9/1/2020)    Pneumococcal Vaccine: Pediatrics (0 to 5 Years) and At-Risk Patients (6 to 59 Years) (1 of 3 - PCV13) 03/03/2022 (Originally 1/15/1975)    DTaP,Tdap,and Td Vaccines (1 - Tdap) 03/03/2022 (Originally 1/15/1990)    Depression Screening PHQ  03/03/2022    BMI: Adult  03/03/2022    HIB Vaccine  Aged Out    Hepatitis B Vaccine  Aged Out    IPV Vaccine  Aged Out    Hepatitis A Vaccine  Aged Out    Meningococcal ACWY Vaccine  Aged Out    HPV Vaccine  Aged Out       There is no immunization history on file for this patient         Hi Wang DO

## 2021-03-08 ENCOUNTER — TELEPHONE (OUTPATIENT)
Dept: FAMILY MEDICINE CLINIC | Facility: CLINIC | Age: 51
End: 2021-03-08

## 2021-03-08 DIAGNOSIS — B34.9 VIRAL INFECTION, UNSPECIFIED: ICD-10-CM

## 2021-03-08 DIAGNOSIS — Z20.822 EXPOSURE TO COVID-19 VIRUS: ICD-10-CM

## 2021-03-08 LAB — SARS-COV-2 RNA RESP QL NAA+PROBE: POSITIVE

## 2021-03-08 PROCEDURE — U0005 INFEC AGEN DETEC AMPLI PROBE: HCPCS | Performed by: FAMILY MEDICINE

## 2021-03-08 PROCEDURE — U0003 INFECTIOUS AGENT DETECTION BY NUCLEIC ACID (DNA OR RNA); SEVERE ACUTE RESPIRATORY SYNDROME CORONAVIRUS 2 (SARS-COV-2) (CORONAVIRUS DISEASE [COVID-19]), AMPLIFIED PROBE TECHNIQUE, MAKING USE OF HIGH THROUGHPUT TECHNOLOGIES AS DESCRIBED BY CMS-2020-01-R: HCPCS | Performed by: FAMILY MEDICINE

## 2021-03-08 NOTE — TELEPHONE ENCOUNTER
Pt has appt today that she has to cancel   Pt has covid symptoms no taste no smell and a fever of 100 8  pt requesting to have a covid test  pls advise thank you

## 2021-03-09 ENCOUNTER — TELEPHONE (OUTPATIENT)
Dept: FAMILY MEDICINE CLINIC | Facility: CLINIC | Age: 51
End: 2021-03-09

## 2021-03-09 NOTE — TELEPHONE ENCOUNTER
Pt called requesting a letter for her employer re: positive results  Request it be electronically faxed to: [email or web address]  Fax # [phone]  Tn: [phone]

## 2021-03-10 ENCOUNTER — TELEMEDICINE (OUTPATIENT)
Dept: FAMILY MEDICINE CLINIC | Facility: CLINIC | Age: 51
End: 2021-03-10
Payer: COMMERCIAL

## 2021-03-10 VITALS — TEMPERATURE: 99.6 F | BODY MASS INDEX: 35.39 KG/M2 | HEIGHT: 63 IN

## 2021-03-10 DIAGNOSIS — M54.40 ACUTE LOW BACK PAIN WITH SCIATICA, SCIATICA LATERALITY UNSPECIFIED, UNSPECIFIED BACK PAIN LATERALITY: ICD-10-CM

## 2021-03-10 DIAGNOSIS — U07.1 COVID-19: Primary | ICD-10-CM

## 2021-03-10 PROCEDURE — 99213 OFFICE O/P EST LOW 20 MIN: CPT | Performed by: FAMILY MEDICINE

## 2021-03-10 NOTE — PROGRESS NOTES
COVID-19 Virtual Visit     Assessment/Plan:    Problem List Items Addressed This Visit     None      Visit Diagnoses     COVID-19    -  Primary    Pt stable  OTC Cough/Cold Preps PRN, rest, & good PO hydration  COV-19 PCR+  Declines BAM therapy right now  Precautions given; self-isolation  Acute low back pain with sciatica, sciatica laterality unspecified, unspecified back pain laterality        Slowly improving; completing Prednisone  Once pt clears COV-19, we will likely need to get her going with Physical Therapy  [id number]     Disposition:     I recommended continued isolation until at least 24 hours have passed since recovery defined as resolution of fever without the use of fever-reducing medications AND improvement in COVID symptoms AND 10 days have passed since onset of symptoms (or 10 days have passed since date of first positive viral diagnostic test for asymptomatic patients)  I have spent 20 minutes directly with the patient  Greater than 50% of this time was spent in counseling/coordination of care regarding: diagnostic results, prognosis, risks and benefits of treatment options, instructions for management, patient and family education, importance of treatment compliance, risk factor reductions and impressions  Possible side effects of bamlanivimab are: Allergic reactions   Allergic reactions can happen during and after infusion with bamlanivimab which include:    fever, chills, nausea, headache, shortness of breath, low blood pressure, wheezing, swelling of your lips, face, or  throat, rash including hives, itching, muscle aches, and dizziness  The side effects of getting any medicine by vein may include brief pain, bleeding, bruising of the skin, soreness,  swelling, and possible infection at the infusion site  These are not all the possible side effects of bamlanivimab  Not a lot of people have been given bamlanivimab  Serious and unexpected side effects may happen  Amita Rodriguez is still being studied so it is possible that all of  the risks are not known at this time  Please note that this drug was approved under the Emergency Use Authorization   of the FDA and has not gone through the full, formal FDA approval process    It is possible that bamlanivimab could interfere with your body's own ability to fight off a future infection of  SARS-CoV-2  Similarly, bamlanivimab may reduce your bodys immune response to a vaccine for SARS-CoV-2  Specific studies have not been conducted to address these possible risks  Currently there is no data or safety or efficacy of COVID-19 vaccination in persons who received monoclonal antibodies   as part of COVID-19 treatment  Treatment should be deferred for at least 90 days to avoid interference of the treatment   with vaccine-induced immune responses (this is based on estimated half-life of therapies and evidence suggesting reinfection   is uncommon within 90 days of initial infection)  The patient consents to proceed with bamlanivimab infusion  Table 2: Treatment-emergent Adverse Events Reported in at Least 1% of All  Subjects in [facility]-1*         Bamlinivimab  Symptom Placebo 700 mg (N=101) 2000 mg (N=107) 7000 mg (N=101) Total (N=309)   Nausea 4% 3% 4% 5% 4%   Diarrhea 5% 1% 2% 7% 3%   Dizziness 2% 3% 3% 3% 3%   Headache 2% 3% 2% 0% 2%   Pruritis 1% 2% 3% 0% 2%   Vomiting 3% 1% 3% 1% 2%     *[email or web address]     We discussed Bamlanivimab therapy at length today, potential R/SE, etc - pt declines at this time (03/10/21)  Pt will call if she changes her mind      Encounter provider Suman Weiner DO    Provider located at [address] [id number]    Recent Visits  Date Type Provider Dept   03/09/21 Telephone Suman Weiner, [address]   03/08/21 Telephone [address]   03/03/21 Office Visit Julianna Vaugnh Yung Mckeon, DO [facility]   Showing recent visits within past 7 days and meeting all other requirements     Today's Visits  Date Type Provider Dept   03/10/21 Telemedicine Gonzalo Mckeon, DO [facility]   Showing today's visits and meeting all other requirements     Future Appointments  No visits were found meeting these conditions  Showing future appointments within next 150 days and meeting all other requirements      This virtual check-in was done via [employer] and patient was informed that this is a secure, HIPAA-compliant platform  She agrees to proceed  Patient agrees to participate in a virtual check in via telephone or video visit instead of presenting to the office to address urgent/immediate medical needs  Patient is aware this is a billable service  After connecting through [facility], the patient was identified by name and date of birth  Inez Lipscomb was informed that this was a telemedicine visit and that the exam was being conducted confidentially over secure lines  My office door was closed  No one else was in the room  Inez Lipscomb acknowledged consent and understanding of privacy and security of the telemedicine visit  I informed the patient that I have reviewed her record in Epic and presented the opportunity for her to ask any questions regarding the visit today  The patient agreed to participate  Subjective:   Inez Lipscomb is a 45 y o  female who has been screened for COVID-19  Symptom change since last report: unchanged  Patient's symptoms include fever, fatigue, anosmia, loss of taste and cough  Patient denies sore throat, shortness of breath and diarrhea  Hui Rod has been staying home and has isolated themselves in her home  She is taking care to not share personal items and is cleaning all surfaces that are touched often, like counters, tabletops, and doorknobs using household cleaning sprays or wipes  She is wearing a mask when she leaves her room       Date of symptom onset: 3/7/2021  Date of positive COVID-19 PCR: 3/8/2021    Berto Barr started getting sick over the weekend  Tested POSITIVE for COVID-19 on 3/8/21  Pt is completing her Prednisone taper for recent back pain - she is better, but still has some pain, and tingling in some toes      Lab Results   Component Value Date    SARSCOV2 Positive (A) 03/08/2021    [address] [facility] 98 Not Detected 10/22/2020     Past Medical History:   Diagnosis Date    Anxiety     Depression      Past Surgical History:   Procedure Laterality Date    ROTATOR CUFF REPAIR       Current Outpatient Medications   Medication Sig Dispense Refill    Acetaminophen (TYLENOL ARTHRITIS PAIN PO) Take by mouth      Ascorbic Acid (vitamin C) 1000 MG tablet Take 1,000 mg by mouth daily      cholecalciferol (VITAMIN D3) 1,000 units tablet Take 2,000 Units by mouth      cyclobenzaprine (FLEXERIL) 5 mg tablet Take 1 tablet (5 mg total) by mouth 3 (three) times a day as needed for muscle spasms 40 tablet 1    diclofenac (VOLTAREN) 75 mg EC tablet Take 1 tablet (75 mg total) by mouth 2 (two) times a day with meals 60 tablet 3    folic acid (FOLVITE) 1 mg tablet Take 1 tablet (1 mg total) by mouth daily 30 tablet 5    hydroxychloroquine (PLAQUENIL) 200 mg tablet Take 1 tablet (200 mg total) by mouth 2 (two) times a day 60 tablet 3    magnesium gluconate (MAGONATE) 500 mg tablet Take 500 mg by mouth daily      methotrexate 2 5 mg tablet 8 tablet po weekly (take all 8 tablets on the same day every week) 40 tablet 3    multivitamin (THERAGRAN) TABS Take 1 tablet by mouth daily      Omega-3 Fatty Acids (Fish Oil Concentrate) 300 MG CAPS Take 1 capsule by mouth      oxyCODONE (ROXICODONE) 5 mg immediate release tablet Take 1 tablet (5 mg total) by mouth every 6 (six) hours as needed for moderate pain or severe painMax Daily Amount: 20 mg 20 tablet 0    predniSONE 10 mg tablet Take 1 tablet (10 mg total) by mouth daily as needed (joint flare) 30 tablet 2    predniSONE 10 mg tablet Take 4 tablets (40 mg total) by mouth daily for 3 days, THEN 3 tablets (30 mg total) daily for 3 days, THEN 2 tablets (20 mg total) daily for 3 days  27 tablet 0    pyridoxine (B-6) 100 MG tablet Take 100 mg by mouth      TRAMADOL HCL PO Take by mouth 50mg as needed      vitamin B-12 (VITAMIN B-12) 1,000 mcg tablet Take by mouth daily      zinc gluconate 50 mg tablet Take 50 mg by mouth daily       No current facility-administered medications for this visit  Allergies   Allergen Reactions    Clarithromycin GI Intolerance       Review of Systems   Constitutional: Positive for fatigue and fever  HENT: Negative for sore throat  Respiratory: Positive for cough  Negative for shortness of breath  Gastrointestinal: Negative for diarrhea  Musculoskeletal: Positive for back pain  Objective:    Vitals:    03/10/21 1442   Temp: 99 6 °F (37 6 °C)   Height: 5' 3" (1 6 m)       Physical Exam  Vitals signs reviewed  Constitutional:       General: She is not in acute distress  Appearance: Normal appearance  She is not ill-appearing, toxic-appearing or diaphoretic  Comments: Jose Howell is tired today; she is speaking in full sentences, non-toxic appearing  HENT:      Head: Normocephalic and atraumatic  Eyes:      General: No scleral icterus  Conjunctiva/sclera: Conjunctivae normal    Pulmonary:      Effort: Pulmonary effort is normal  No respiratory distress  Neurological:      Mental Status: She is alert and oriented to person, place, and time  Psychiatric:         Mood and Affect: Mood normal          Behavior: Behavior normal          Thought Content: Thought content normal          Judgment: Judgment normal        VIRTUAL VISIT DISCLAIMER    Mariely Nayak acknowledges that she has consented to an online visit or consultation   She understands that the online visit is based solely on information provided by her, and that, in the absence of a face-to-face physical evaluation by the physician, the diagnosis she receives is both limited and provisional in terms of accuracy and completeness  This is not intended to replace a full medical face-to-face evaluation by the physician  Cam Kemp understands and accepts these terms

## 2021-03-12 ENCOUNTER — TELEPHONE (OUTPATIENT)
Dept: INFUSION CENTER | Facility: CLINIC | Age: 51
End: 2021-03-12

## 2021-03-12 ENCOUNTER — DOCUMENTATION (OUTPATIENT)
Dept: FAMILY MEDICINE CLINIC | Facility: CLINIC | Age: 51
End: 2021-03-12

## 2021-03-12 DIAGNOSIS — U07.1 COVID-19: Primary | ICD-10-CM

## 2021-03-12 PROCEDURE — 99213 OFFICE O/P EST LOW 20 MIN: CPT | Performed by: NURSE PRACTITIONER

## 2021-03-12 RX ORDER — SODIUM CHLORIDE 9 MG/ML
20 INJECTION, SOLUTION INTRAVENOUS ONCE
Status: CANCELLED | OUTPATIENT
Start: 2021-03-15

## 2021-03-12 RX ORDER — ACETAMINOPHEN 325 MG/1
650 TABLET ORAL ONCE AS NEEDED
Status: CANCELLED | OUTPATIENT
Start: 2021-03-15

## 2021-03-12 RX ORDER — ALBUTEROL SULFATE 90 UG/1
3 AEROSOL, METERED RESPIRATORY (INHALATION) ONCE AS NEEDED
Status: CANCELLED | OUTPATIENT
Start: 2021-03-15

## 2021-03-12 NOTE — PROGRESS NOTES
Bam Therapy was scheduled for Monday at 9 am but was informed to report at 830   Instructions were given and the patient repeated them back

## 2021-03-12 NOTE — TELEPHONE ENCOUNTER
Left message for patient, Infusion center to open tomorrow, patient can call [phone] if she wants to move her appointment from Monday to Saturday

## 2021-03-12 NOTE — PROGRESS NOTES
COVID-19 Virtual Visit     Assessment/Plan:    Problem List Items Addressed This Visit        Other    COVID-19 - Primary         Disposition:     I recommended continued isolation until at least 24 hours have passed since recovery defined as resolution of fever without the use of fever-reducing medications AND improvement in COVID symptoms AND 10 days have passed since onset of symptoms (or 10 days have passed since date of first positive viral diagnostic test for asymptomatic patients)  Patient is a candidate for Bamlanivimab  They were counseled in regards to risks, benefits, and side effects of this infusion  Possible side effects of bamlanivimab are: Allergic reactions   Allergic reactions can happen during and after infusion with bamlanivimab which include:    Fever, chills, nausea, headache, shortness of breath, low blood pressure, wheezing, swelling of your lips, face, or throat, rash including hives, itching, muscle aches, and dizziness  The side effects of getting any medicine by vein may include brief pain, bleeding, bruising of the skin, soreness, swelling, and possible infection at the infusion site  These are not all the possible side effects of bamlanivimab  Not a lot of people have been given bamlanivimab  Serious and unexpected side effects may happen  Aurora Saha is still being studied so it is possible that all of the risks are not known at this time  Please note that this drug was approved under the Emergency Use Authorization of the FDA and has not gone through the full, formal FDA approval process    It is possible that bamlanivimab could interfere with your body's own ability to fight off a future infection of SARS-CoV-2  Similarly, bamlanivimab may reduce your bodys immune response to a vaccine for SARS-CoV-2  Specific studies have not been conducted to address these possible risks       Currently there is no data or safety or efficacy of COVID-19 vaccination in persons who received monoclonal antibodies as part of COVID-19 treatment  Treatment should be deferred for at least 90 days to avoid interference of the treatment with vaccine-induced immune responses (this is based on estimated half-life of therapies and evidence suggesting reinfection is uncommon within 90 days of initial infection)  The patient consents to proceed with bamlanivimab infusion  *[email or web address]  com/eua/[email or web address]-eua-factsheet-hcp  pdf    I have spent 15 minutes directly with the patient  Greater than 50% of this time was spent in counseling/coordination of care regarding: prognosis, risks and benefits of treatment options, instructions for management, patient and family education, importance of treatment compliance, risk factor reductions and impressions  Encounter provider ELVIRA Roy    Provider located at [address] [place] [id number]    Recent Visits  Date Type Provider Dept   03/10/21 Telemedicine Marsh Rubinstein, [address]   03/09/21 Telephone DO Fazal Pickering   03/08/21 Telephone Edmundo Styles   Showing recent visits within past 7 days and meeting all other requirements     Future Appointments  No visits were found meeting these conditions  Showing future appointments within next 150 days and meeting all other requirements        Patient agrees to participate in a virtual check in via telephone or video visit instead of presenting to the office to address urgent/immediate medical needs  Patient is aware this is a billable service  After connecting through Telephone, the patient was identified by name and date of birth  Fredy Whiting was informed that this was a telemedicine visit and that the exam was being conducted confidentially over secure lines  My office door was closed  No one else was in the room   Fredy Whiting acknowledged consent and understanding of privacy and security of the telemedicine visit  I informed the patient that I have reviewed her record in Epic and presented the opportunity for her to ask any questions regarding the visit today  The patient agreed to participate  It was my intent to perform this visit via video technology but the patient was not able to do a video connection so the visit was completed via audio telephone only  Subjective:   Ghada Baltazar is a 45 y o  female who has been screened for COVID-19  Symptom change since last report: unchanged  Patient's symptoms include fever, fatigue, cough and myalgias  Patient denies shortness of breath and diarrhea  Shruthi Harley has been staying home and has isolated themselves in her home  She is taking care to not share personal items and is cleaning all surfaces that are touched often, like counters, tabletops, and doorknobs using household cleaning sprays or wipes  She is wearing a mask when she leaves her room  Date of symptom onset: 3/7/2021  Date of positive COVID-19 PCR: 3/8/2021    Pt called the office today, and now desires BAM therapy  We discussed at length yesterday at her TeleVideo Visit      Lab Results   Component Value Date    SARSCOV2 Positive (A) 03/08/2021    [facility] Not Detected 10/22/2020     Past Medical History:   Diagnosis Date    Anxiety     Depression      Past Surgical History:   Procedure Laterality Date    ROTATOR CUFF REPAIR       Current Outpatient Medications   Medication Sig Dispense Refill    Acetaminophen (TYLENOL ARTHRITIS PAIN PO) Take by mouth      Ascorbic Acid (vitamin C) 1000 MG tablet Take 1,000 mg by mouth daily      cholecalciferol (VITAMIN D3) 1,000 units tablet Take 2,000 Units by mouth      cyclobenzaprine (FLEXERIL) 5 mg tablet Take 1 tablet (5 mg total) by mouth 3 (three) times a day as needed for muscle spasms 40 tablet 1    diclofenac (VOLTAREN) 75 mg EC tablet Take 1 tablet (75 mg total) by mouth 2 (two) times a day with meals 60 tablet 3    folic acid (FOLVITE) 1 mg tablet Take 1 tablet (1 mg total) by mouth daily 30 tablet 5    hydroxychloroquine (PLAQUENIL) 200 mg tablet Take 1 tablet (200 mg total) by mouth 2 (two) times a day 60 tablet 3    magnesium gluconate (MAGONATE) 500 mg tablet Take 500 mg by mouth daily      methotrexate 2 5 mg tablet 8 tablet po weekly (take all 8 tablets on the same day every week) 40 tablet 3    multivitamin (THERAGRAN) TABS Take 1 tablet by mouth daily      Omega-3 Fatty Acids (Fish Oil Concentrate) 300 MG CAPS Take 1 capsule by mouth      oxyCODONE (ROXICODONE) 5 mg immediate release tablet Take 1 tablet (5 mg total) by mouth every 6 (six) hours as needed for moderate pain or severe painMax Daily Amount: 20 mg 20 tablet 0    predniSONE 10 mg tablet Take 1 tablet (10 mg total) by mouth daily as needed (joint flare) 30 tablet 2    predniSONE 10 mg tablet Take 4 tablets (40 mg total) by mouth daily for 3 days, THEN 3 tablets (30 mg total) daily for 3 days, THEN 2 tablets (20 mg total) daily for 3 days  27 tablet 0    pyridoxine (B-6) 100 MG tablet Take 100 mg by mouth      TRAMADOL HCL PO Take by mouth 50mg as needed      vitamin B-12 (VITAMIN B-12) 1,000 mcg tablet Take by mouth daily      zinc gluconate 50 mg tablet Take 50 mg by mouth daily       No current facility-administered medications for this visit  Allergies   Allergen Reactions    Clarithromycin GI Intolerance       Review of Systems   Constitutional: Positive for fatigue and fever  Respiratory: Positive for cough  Negative for shortness of breath  Gastrointestinal: Negative for diarrhea  Musculoskeletal: Positive for myalgias  Objective: There were no vitals filed for this visit  Physical Exam  Constitutional:       General: She is not in acute distress  Pulmonary:      Effort: No respiratory distress  Neurological:      Mental Status: She is alert and oriented to person, place, and time     Psychiatric:         Mood and Affect: Mood normal          Thought Content: Thought content normal          Judgment: Judgment normal      Hui Rod was seen today for covid-19  Diagnoses and all orders for this visit:    COVID-19  Comments:  Pt stable; Bamlanivimab therapy ordered  F/u in 3 days by TeleVideo Visit, or sooner PRN  [id number]    VIRTUAL VISIT DISCLAIMER    Jerelfrancesco Lipscomb acknowledges that she has consented to an online visit or consultation  She understands that the online visit is based solely on information provided by her, and that, in the absence of a face-to-face physical evaluation by the physician, the diagnosis she receives is both limited and provisional in terms of accuracy and completeness  This is not intended to replace a full medical face-to-face evaluation by the physician  Inez Lipscomb understands and accepts these terms

## 2021-03-15 ENCOUNTER — HOSPITAL ENCOUNTER (OUTPATIENT)
Dept: INFUSION CENTER | Facility: HOSPITAL | Age: 51
Discharge: HOME | End: 2021-03-15
Payer: COMMERCIAL

## 2021-03-15 VITALS
SYSTOLIC BLOOD PRESSURE: 96 MMHG | RESPIRATION RATE: 14 BRPM | DIASTOLIC BLOOD PRESSURE: 64 MMHG | TEMPERATURE: 98.6 F | HEART RATE: 85 BPM | OXYGEN SATURATION: 97 %

## 2021-03-15 DIAGNOSIS — U07.1 COVID-19: Primary | ICD-10-CM

## 2021-03-15 PROCEDURE — M0239 BAMLANIVIMAB-XXXX INFUSION: HCPCS | Performed by: FAMILY MEDICINE

## 2021-03-15 RX ORDER — SODIUM CHLORIDE 9 MG/ML
20 INJECTION, SOLUTION INTRAVENOUS ONCE
Status: CANCELLED | OUTPATIENT
Start: 2021-03-15

## 2021-03-15 RX ORDER — ACETAMINOPHEN 325 MG/1
650 TABLET ORAL ONCE AS NEEDED
Status: DISCONTINUED | OUTPATIENT
Start: 2021-03-15 | End: 2021-03-18 | Stop reason: HOSPADM

## 2021-03-15 RX ORDER — ALBUTEROL SULFATE 90 UG/1
3 AEROSOL, METERED RESPIRATORY (INHALATION) ONCE AS NEEDED
Status: DISCONTINUED | OUTPATIENT
Start: 2021-03-15 | End: 2021-03-18 | Stop reason: HOSPADM

## 2021-03-15 RX ORDER — ALBUTEROL SULFATE 90 UG/1
3 AEROSOL, METERED RESPIRATORY (INHALATION) ONCE AS NEEDED
Status: CANCELLED | OUTPATIENT
Start: 2021-03-15

## 2021-03-15 RX ORDER — SODIUM CHLORIDE 9 MG/ML
20 INJECTION, SOLUTION INTRAVENOUS ONCE
Status: COMPLETED | OUTPATIENT
Start: 2021-03-15 | End: 2021-03-15

## 2021-03-15 RX ORDER — ACETAMINOPHEN 325 MG/1
650 TABLET ORAL ONCE AS NEEDED
Status: CANCELLED | OUTPATIENT
Start: 2021-03-15

## 2021-03-15 RX ADMIN — SODIUM CHLORIDE 700 MG: 9 INJECTION, SOLUTION INTRAVENOUS at 08:56

## 2021-03-15 RX ADMIN — SODIUM CHLORIDE 20 ML/HR: 0.9 INJECTION, SOLUTION INTRAVENOUS at 08:48

## 2021-03-15 NOTE — PROGRESS NOTES
Pt arrived, vitals stable,  IV access obtained in L AC  Verbal consent given for infusion  Pt resting comfortably in chair awaiting infusion from pharmacy  No complaints at this time  Will continue to monitor

## 2021-03-15 NOTE — PLAN OF CARE
Problem: Potential for Falls  Goal: Patient will remain free of falls  Description: INTERVENTIONS:  - Assess patient frequently for physical needs  -  Identify cognitive and physical deficits and behaviors that affect risk of falls    -  [place] fall precautions as indicated by assessment   - Educate patient/family on patient safety including physical limitations  - Instruct patient to call for assistance with activity based on assessment  - Modify environment to reduce risk of injury  - Consider OT/PT consult to assist with strengthening/mobility  3/15/2021 0846 by Douglas Day RN  Outcome: Progressing  3/15/2021 0844 by Douglas Day RN  Outcome: Progressing     Problem: PAIN - ADULT  Goal: Verbalizes/displays adequate comfort level or baseline comfort level  Description: Interventions:  - Encourage patient to monitor pain and request assistance  - Assess pain using appropriate pain scale  - Administer analgesics based on type and severity of pain and evaluate response  - Implement non-pharmacological measures as appropriate and evaluate response  - Consider cultural and social influences on pain and pain management  - Notify physician/advanced practitioner if interventions unsuccessful or patient reports new pain  3/15/2021 0846 by Douglas Day RN  Outcome: Progressing  3/15/2021 0844 by Doulgas Day RN  Outcome: Progressing     Problem: INFECTION - ADULT  Goal: Absence or prevention of progression during hospitalization  Description: INTERVENTIONS:  - Assess and monitor for signs and symptoms of infection  - Monitor lab/diagnostic results  - Monitor all insertion sites, i e  indwelling lines, tubes, and drains  - Monitor endotracheal if appropriate and nasal secretions for changes in amount and color  - [place] appropriate cooling/warming therapies per order  - Administer medications as ordered  - Instruct and encourage patient and family to use good hand hygiene technique  - Identify and instruct in appropriate isolation precautions for identified infection/condition  3/15/2021 0846 by Janis Reyez RN  Outcome: Progressing  3/15/2021 0844 by Janis Reyez RN  Outcome: Progressing  Goal: Absence of fever/infection during neutropenic period  Description: INTERVENTIONS:  - Monitor WBC    3/15/2021 0846 by Janis Reyez RN  Outcome: Progressing  3/15/2021 0844 by Janis Reyez RN  Outcome: Progressing     Problem: SAFETY ADULT  Goal: Patient will remain free of falls  Description: INTERVENTIONS:  - Assess patient frequently for physical needs  -  Identify cognitive and physical deficits and behaviors that affect risk of falls    -  [place] fall precautions as indicated by assessment   - Educate patient/family on patient safety including physical limitations  - Instruct patient to call for assistance with activity based on assessment  - Modify environment to reduce risk of injury  - Consider OT/PT consult to assist with strengthening/mobility  3/15/2021 0846 by Janis Reyez RN  Outcome: Progressing  3/15/2021 0844 by Janis Reyez RN  Outcome: Progressing  Goal: Maintain or return to baseline ADL function  Description: INTERVENTIONS:  -  Assess patient's ability to carry out ADLs; assess patient's baseline for ADL function and identify physical deficits which impact ability to perform ADLs (bathing, care of mouth/teeth, toileting, grooming, dressing, etc )  - Assess/evaluate cause of self-care deficits   - Assess range of motion  - Assess patient's mobility; develop plan if impaired  - Assess patient's need for assistive devices and provide as appropriate  - Encourage maximum independence but intervene and supervise when necessary  - Involve family in performance of ADLs  - Assess for home care needs following discharge   - Consider OT consult to assist with ADL evaluation and planning for discharge  - Provide patient education as appropriate  3/15/2021 0846 by Monalisa Jay RN  Outcome: Progressing  3/15/2021 0844 by Monalisa Jay RN  Outcome: Progressing  Goal: Maintain or return mobility status to optimal level  Description: INTERVENTIONS:  - Assess patient's baseline mobility status (ambulation, transfers, stairs, etc )    - Identify cognitive and physical deficits and behaviors that affect mobility  - Identify mobility aids required to assist with transfers and/or ambulation (gait belt, sit-to-stand, lift, walker, cane, etc )  - [place] fall precautions as indicated by assessment  - Record patient progress and toleration of activity level on Mobility SBAR; progress patient to next Phase/Stage  - Instruct patient to call for assistance with activity based on assessment  - Consider rehabilitation consult to assist with strengthening/weightbearing, etc   3/15/2021 0846 by Monalisa Jay RN  Outcome: Progressing  3/15/2021 0844 by Monalisa Jay RN  Outcome: Progressing     Problem: Knowledge Deficit  Goal: Patient/family/caregiver demonstrates understanding of disease process, treatment plan, medications, and discharge instructions  Description: Complete learning assessment and assess knowledge base    Interventions:  - Provide teaching at level of understanding  - Provide teaching via preferred learning methods  3/15/2021 0846 by Monalisa Jay RN  Outcome: Progressing  3/15/2021 0844 by Monalisa Jay RN  Outcome: Progressing

## 2021-03-15 NOTE — PROGRESS NOTES
Infusion and monitoring time completed  Reviewed discharge instructions, IV removed and vitals remain stable  No questions at this time, Pt  escorted out to parking lot

## 2021-03-15 NOTE — PLAN OF CARE
Problem: Potential for Falls  Goal: Patient will remain free of falls  Description: INTERVENTIONS:  - Assess patient frequently for physical needs  -  Identify cognitive and physical deficits and behaviors that affect risk of falls    -  [place] fall precautions as indicated by assessment   - Educate patient/family on patient safety including physical limitations  - Instruct patient to call for assistance with activity based on assessment  - Modify environment to reduce risk of injury  - Consider OT/PT consult to assist with strengthening/mobility  Outcome: Progressing     Problem: PAIN - ADULT  Goal: Verbalizes/displays adequate comfort level or baseline comfort level  Description: Interventions:  - Encourage patient to monitor pain and request assistance  - Assess pain using appropriate pain scale  - Administer analgesics based on type and severity of pain and evaluate response  - Implement non-pharmacological measures as appropriate and evaluate response  - Consider cultural and social influences on pain and pain management  - Notify physician/advanced practitioner if interventions unsuccessful or patient reports new pain  Outcome: Progressing     Problem: INFECTION - ADULT  Goal: Absence or prevention of progression during hospitalization  Description: INTERVENTIONS:  - Assess and monitor for signs and symptoms of infection  - Monitor lab/diagnostic results  - Monitor all insertion sites, i e  indwelling lines, tubes, and drains  - Monitor endotracheal if appropriate and nasal secretions for changes in amount and color  - [place] appropriate cooling/warming therapies per order  - Administer medications as ordered  - Instruct and encourage patient and family to use good hand hygiene technique  - Identify and instruct in appropriate isolation precautions for identified infection/condition  Outcome: Progressing  Goal: Absence of fever/infection during neutropenic period  Description: INTERVENTIONS:  - Monitor WBC    Outcome: Progressing     Problem: SAFETY ADULT  Goal: Patient will remain free of falls  Description: INTERVENTIONS:  - Assess patient frequently for physical needs  -  Identify cognitive and physical deficits and behaviors that affect risk of falls    -  [place] fall precautions as indicated by assessment   - Educate patient/family on patient safety including physical limitations  - Instruct patient to call for assistance with activity based on assessment  - Modify environment to reduce risk of injury  - Consider OT/PT consult to assist with strengthening/mobility  Outcome: Progressing  Goal: Maintain or return to baseline ADL function  Description: INTERVENTIONS:  -  Assess patient's ability to carry out ADLs; assess patient's baseline for ADL function and identify physical deficits which impact ability to perform ADLs (bathing, care of mouth/teeth, toileting, grooming, dressing, etc )  - Assess/evaluate cause of self-care deficits   - Assess range of motion  - Assess patient's mobility; develop plan if impaired  - Assess patient's need for assistive devices and provide as appropriate  - Encourage maximum independence but intervene and supervise when necessary  - Involve family in performance of ADLs  - Assess for home care needs following discharge   - Consider OT consult to assist with ADL evaluation and planning for discharge  - Provide patient education as appropriate  Outcome: Progressing  Goal: Maintain or return mobility status to optimal level  Description: INTERVENTIONS:  - Assess patient's baseline mobility status (ambulation, transfers, stairs, etc )    - Identify cognitive and physical deficits and behaviors that affect mobility  - Identify mobility aids required to assist with transfers and/or ambulation (gait belt, sit-to-stand, lift, walker, cane, etc )  - [place] fall precautions as indicated by assessment  - Record patient progress and toleration of activity level on Mobility SBAR; progress patient to next Phase/Stage  - Instruct patient to call for assistance with activity based on assessment  - Consider rehabilitation consult to assist with strengthening/weightbearing, etc   Outcome: Progressing     Problem: Knowledge Deficit  Goal: Patient/family/caregiver demonstrates understanding of disease process, treatment plan, medications, and discharge instructions  Description: Complete learning assessment and assess knowledge base    Interventions:  - Provide teaching at level of understanding  - Provide teaching via preferred learning methods  Outcome: Progressing

## 2021-03-16 ENCOUNTER — TELEMEDICINE (OUTPATIENT)
Dept: FAMILY MEDICINE CLINIC | Facility: CLINIC | Age: 51
End: 2021-03-16
Payer: COMMERCIAL

## 2021-03-16 ENCOUNTER — TELEPHONE (OUTPATIENT)
Dept: FAMILY MEDICINE CLINIC | Facility: CLINIC | Age: 51
End: 2021-03-16

## 2021-03-16 VITALS — TEMPERATURE: 98.8 F

## 2021-03-16 DIAGNOSIS — U07.1 COVID-19: Primary | ICD-10-CM

## 2021-03-16 PROCEDURE — 99213 OFFICE O/P EST LOW 20 MIN: CPT | Performed by: FAMILY MEDICINE

## 2021-03-16 NOTE — PROGRESS NOTES
COVID-19 Virtual Visit     Assessment/Plan:    Problem List Items Addressed This Visit        Other    COVID-19 - Primary      [address]     Disposition:     I recommended continued isolation until at least 24 hours have passed since recovery defined as resolution of fever without the use of fever-reducing medications AND improvement in COVID symptoms AND 10 days have passed since onset of symptoms (or 10 days have passed since date of first positive viral diagnostic test for asymptomatic patients)  I have spent 15 minutes directly with the patient  Greater than 50% of this time was spent in counseling/coordination of care regarding: prognosis, risks and benefits of treatment options, instructions for management, patient and family education, importance of treatment compliance, risk factor reductions and impressions  Encounter provider Glenny Golden DO    Provider located at [address] [address]    Recent Visits  Date Type Provider Dept   03/10/21 Telemedicine Glenny Golden, [address] [place] [address] Drive   03/09/21 Telephone DO Fazal Andrews   Showing recent visits within past 7 days and meeting all other requirements     Today's Visits  Date Type Provider Dept   03/16/21 Telemedicine DO Fazal Andrews   Showing today's visits and meeting all other requirements     Future Appointments  No visits were found meeting these conditions  Showing future appointments within next 150 days and meeting all other requirements      This virtual check-in was done via [employer] and patient was informed that this is a secure, HIPAA-compliant platform  She agrees to proceed  Patient agrees to participate in a virtual check in via telephone or video visit instead of presenting to the office to address urgent/immediate medical needs  Patient is aware this is a billable service      After connecting through [facility], the patient was identified by name and date of birth  Conner Dominguez was informed that this was a telemedicine visit and that the exam was being conducted confidentially over secure lines  My office door was closed  No one else was in the room  Conner Dominguez acknowledged consent and understanding of privacy and security of the telemedicine visit  I informed the patient that I have reviewed her record in Epic and presented the opportunity for her to ask any questions regarding the visit today  The patient agreed to participate  Subjective:   Conner Dominguez is a 45 y o  female who has been screened for COVID-19  Symptom change since last report: unchanged  Patient's symptoms include fever, fatigue, sore throat, anosmia, loss of taste, cough, diarrhea and myalgias  Patient denies shortness of breath  Ronni Chand has been staying home and has isolated themselves in her home  She is taking care to not share personal items and is cleaning all surfaces that are touched often, like counters, tabletops, and doorknobs using household cleaning sprays or wipes  She is wearing a mask when she leaves her room  Date of symptom onset: 3/7/2021  Date of positive COVID-19 PCR: 3/8/2021    Monoclonal Antibody Follow-up Symptom Questionnaire  I feel overall: similar  My breathing is: similar  My fever is: same  My fatigue is: similar    Alison completed Bamlanivimab therapy yesterday - has noticed no improvement yet  +Hoarseness and a dry cough - cough a little better today        Lab Results   Component Value Date    SARSCOV2 Positive (A) 03/08/2021    SARSCOV2 Not Detected 10/22/2020     Past Medical History:   Diagnosis Date    Anxiety     Depression      Past Surgical History:   Procedure Laterality Date    ROTATOR CUFF REPAIR       Current Outpatient Medications   Medication Sig Dispense Refill    Acetaminophen (TYLENOL ARTHRITIS PAIN PO) Take by mouth      Ascorbic Acid (vitamin C) 1000 MG tablet Take 1,000 mg by mouth daily  cholecalciferol (VITAMIN D3) 1,000 units tablet Take 2,000 Units by mouth      cyclobenzaprine (FLEXERIL) 5 mg tablet Take 1 tablet (5 mg total) by mouth 3 (three) times a day as needed for muscle spasms 40 tablet 1    diclofenac (VOLTAREN) 75 mg EC tablet Take 1 tablet (75 mg total) by mouth 2 (two) times a day with meals 60 tablet 3    folic acid (FOLVITE) 1 mg tablet Take 1 tablet (1 mg total) by mouth daily 30 tablet 5    hydroxychloroquine (PLAQUENIL) 200 mg tablet Take 1 tablet (200 mg total) by mouth 2 (two) times a day 60 tablet 3    magnesium gluconate (MAGONATE) 500 mg tablet Take 500 mg by mouth daily      methotrexate 2 5 mg tablet 8 tablet po weekly (take all 8 tablets on the same day every week) 40 tablet 3    multivitamin (THERAGRAN) TABS Take 1 tablet by mouth daily      Omega-3 Fatty Acids (Fish Oil Concentrate) 300 MG CAPS Take 1 capsule by mouth      oxyCODONE (ROXICODONE) 5 mg immediate release tablet Take 1 tablet (5 mg total) by mouth every 6 (six) hours as needed for moderate pain or severe painMax Daily Amount: 20 mg 20 tablet 0    predniSONE 10 mg tablet Take 1 tablet (10 mg total) by mouth daily as needed (joint flare) 30 tablet 2    pyridoxine (B-6) 100 MG tablet Take 100 mg by mouth      TRAMADOL HCL PO Take by mouth 50mg as needed      vitamin B-12 (VITAMIN B-12) 1,000 mcg tablet Take by mouth daily      zinc gluconate 50 mg tablet Take 50 mg by mouth daily       No current facility-administered medications for this visit        Facility-Administered Medications Ordered in Other Visits   Medication Dose Route Frequency Provider Last Rate Last Admin    acetaminophen (TYLENOL) tablet 650 mg  650 mg Oral Once PRN Gonzalo Cimorelli, DO        albuterol (PROVENTIL HFA,VENTOLIN HFA) inhaler 3 puff  3 puff Inhalation Once PRN Gonzalo Cimorelli, DO         Allergies   Allergen Reactions    Clarithromycin GI Intolerance       Review of Systems   Constitutional: Positive for fatigue and fever  Negative for activity change  HENT: Positive for sore throat  Respiratory: Positive for cough  Negative for shortness of breath  Cardiovascular: Negative for chest pain  Gastrointestinal: Positive for diarrhea  Musculoskeletal: Positive for myalgias  Objective:    Vitals:    03/16/21 1133   Temp: 98 8 °F (37 1 °C)       Physical Exam  Vitals signs reviewed  Constitutional:       General: She is not in acute distress  Appearance: Normal appearance  She is ill-appearing  She is not toxic-appearing or diaphoretic  Comments: Nyla Arredondo appears tired, with nasal congestion; she is speaking in full sentences without pauses  HENT:      Head: Normocephalic and atraumatic  Eyes:      General: No scleral icterus  Conjunctiva/sclera: Conjunctivae normal    Pulmonary:      Effort: Pulmonary effort is normal  No respiratory distress  Neurological:      Mental Status: She is alert and oriented to person, place, and time  Psychiatric:         Mood and Affect: Mood normal          Behavior: Behavior normal          Thought Content: Thought content normal          Judgment: Judgment normal      Nyla Arredondo was seen today for virtual brief visit and covid-19  Diagnoses and all orders for this visit:    COVID-19  Comments:  Pt stable, not feeling well  Hoping given BAM therapy another 24 hours will help  OTC Cold Preps PRN  Self-isolation  Strict precautions given  VIRTUAL VISIT DISCLAIMER    Kay Emanuel acknowledges that she has consented to an online visit or consultation  She understands that the online visit is based solely on information provided by her, and that, in the absence of a face-to-face physical evaluation by the physician, the diagnosis she receives is both limited and provisional in terms of accuracy and completeness  This is not intended to replace a full medical face-to-face evaluation by the physician  Kay Fairly understands and accepts these terms

## 2021-03-16 NOTE — TELEPHONE ENCOUNTER
Pt requests a note for work stating that she is still unable to return to work and that she was seen today, 3/16/2021       PT requests it be faxed to [phone]  Attn: Niharika Man ()  Tn: [phone]

## 2021-03-18 ENCOUNTER — TELEMEDICINE (OUTPATIENT)
Dept: FAMILY MEDICINE CLINIC | Facility: CLINIC | Age: 51
End: 2021-03-18
Payer: COMMERCIAL

## 2021-03-18 DIAGNOSIS — U07.1 COVID-19: Primary | ICD-10-CM

## 2021-03-18 DIAGNOSIS — M54.42 ACUTE LEFT-SIDED LOW BACK PAIN WITH LEFT-SIDED SCIATICA: ICD-10-CM

## 2021-03-18 PROCEDURE — 99213 OFFICE O/P EST LOW 20 MIN: CPT | Performed by: FAMILY MEDICINE

## 2021-03-18 NOTE — PROGRESS NOTES
COVID-19 Virtual Visit     Assessment/Plan:    Problem List Items Addressed This Visit        Other    COVID-19 - Primary      Other Visit Diagnoses     Acute left-sided low back pain with left-sided sciatica        Improved, but still with numbness in the left leg (had steroid taper recently)  Will continue w/u for the pt, once she clears COV-19       [id number]     Disposition:     I recommended continued isolation until at least 24 hours have passed since recovery defined as resolution of fever without the use of fever-reducing medications AND improvement in COVID symptoms AND 10 days have passed since onset of symptoms (or 10 days have passed since date of first positive viral diagnostic test for asymptomatic patients)  I have spent 15 minutes directly with the patient  Greater than 50% of this time was spent in counseling/coordination of care regarding: prognosis, risks and benefits of treatment options, instructions for management, patient and family education, importance of treatment compliance, risk factor reductions and impressions  Encounter provider Glenny Golden DO    Provider located at [address] [address]    Recent Visits  Date Type Provider Dept   03/16/21 Telephone Glenny Golden, [address] [place] [address] Drive   03/16/21 Telemedicine Gonzalo Rojas [facility] recent visits within past 7 days and meeting all other requirements     Today's Visits  Date Type Provider Dept   03/18/21 Telemedicine DO Fazal Esposito   Showing today's visits and meeting all other requirements     Future Appointments  No visits were found meeting these conditions  Showing future appointments within next 150 days and meeting all other requirements      This virtual check-in was done via [employer] and patient was informed that this is a secure, HIPAA-compliant platform  She agrees to proceed      Patient agrees to participate in a virtual check in via telephone or video visit instead of presenting to the office to address urgent/immediate medical needs  Patient is aware this is a billable service  After connecting through [facility], the patient was identified by name and date of birth  Cam Kemp was informed that this was a telemedicine visit and that the exam was being conducted confidentially over secure lines  My office door was closed  No one else was in the room  aCm Kemp acknowledged consent and understanding of privacy and security of the telemedicine visit  I informed the patient that I have reviewed her record in Epic and presented the opportunity for her to ask any questions regarding the visit today  The patient agreed to participate  Subjective:   Cam Kemp is a 45 y o  female who has been screened for COVID-19  Symptom change since last report: improving  Patient's symptoms include anosmia, loss of taste and diarrhea  Patient denies fever and shortness of breath  Forrestine Shires has been staying home and has isolated themselves in her home  She is taking care to not share personal items and is cleaning all surfaces that are touched often, like counters, tabletops, and doorknobs using household cleaning sprays or wipes  She is wearing a mask when she leaves her room  Date of symptom onset: 3/7/2021  Date of positive COVID-19 PCR: 3/8/2021    Monoclonal Antibody Follow-up Symptom Questionnaire  I feel overall: somewhat better  My breathing is: somewhat better  My fever is: better  My fatigue is: somewhat better    Forrestine Shires is slowly feeling better now  Last borderline temp was last night at 99 3F  Diarrhea resolved today  Sense of smell slowly improving      Lab Results   Component Value Date    SARSCOV2 Positive (A) 03/08/2021    SARSCOV2 Not Detected 10/22/2020     Past Medical History:   Diagnosis Date    Anxiety     Depression      Past Surgical History:   Procedure Laterality Date    ROTATOR CUFF REPAIR       Current Outpatient Medications   Medication Sig Dispense Refill    Acetaminophen (TYLENOL ARTHRITIS PAIN PO) Take by mouth      Ascorbic Acid (vitamin C) 1000 MG tablet Take 1,000 mg by mouth daily      cholecalciferol (VITAMIN D3) 1,000 units tablet Take 2,000 Units by mouth      cyclobenzaprine (FLEXERIL) 5 mg tablet Take 1 tablet (5 mg total) by mouth 3 (three) times a day as needed for muscle spasms 40 tablet 1    diclofenac (VOLTAREN) 75 mg EC tablet Take 1 tablet (75 mg total) by mouth 2 (two) times a day with meals 60 tablet 3    folic acid (FOLVITE) 1 mg tablet Take 1 tablet (1 mg total) by mouth daily 30 tablet 5    hydroxychloroquine (PLAQUENIL) 200 mg tablet Take 1 tablet (200 mg total) by mouth 2 (two) times a day 60 tablet 3    magnesium gluconate (MAGONATE) 500 mg tablet Take 500 mg by mouth daily      methotrexate 2 5 mg tablet 8 tablet po weekly (take all 8 tablets on the same day every week) 40 tablet 3    multivitamin (THERAGRAN) TABS Take 1 tablet by mouth daily      Omega-3 Fatty Acids (Fish Oil Concentrate) 300 MG CAPS Take 1 capsule by mouth      oxyCODONE (ROXICODONE) 5 mg immediate release tablet Take 1 tablet (5 mg total) by mouth every 6 (six) hours as needed for moderate pain or severe painMax Daily Amount: 20 mg 20 tablet 0    predniSONE 10 mg tablet Take 1 tablet (10 mg total) by mouth daily as needed (joint flare) 30 tablet 2    pyridoxine (B-6) 100 MG tablet Take 100 mg by mouth      TRAMADOL HCL PO Take by mouth 50mg as needed      vitamin B-12 (VITAMIN B-12) 1,000 mcg tablet Take by mouth daily      zinc gluconate 50 mg tablet Take 50 mg by mouth daily       No current facility-administered medications for this visit  Allergies   Allergen Reactions    Clarithromycin GI Intolerance       Review of Systems   Constitutional: Negative for fever  Respiratory: Negative for shortness of breath  Cardiovascular: Negative for chest pain  Gastrointestinal: Positive for diarrhea  Musculoskeletal:        No back or leg pain right now - still has residual numbness in the posterior left leg and foot  Objective: There were no vitals filed for this visit  Physical Exam  Constitutional:       General: She is not in acute distress  Appearance: Normal appearance  She is not ill-appearing, toxic-appearing or diaphoretic  HENT:      Head: Normocephalic and atraumatic  Eyes:      General: No scleral icterus  Conjunctiva/sclera: Conjunctivae normal    Pulmonary:      Effort: Pulmonary effort is normal  No respiratory distress  Neurological:      Mental Status: She is alert and oriented to person, place, and time  Psychiatric:         Mood and Affect: Mood normal          Behavior: Behavior normal          Thought Content: Thought content normal          Judgment: Judgment normal      Michelle Enriquez was seen today for covid-19  Diagnoses and all orders for this visit:    COVID-19  Comments:  Pt stable, improving  BAM therapy appears now to have helped  OTC Cold Preps PRN  Self-isolation  Precautions given  Acute left-sided low back pain with left-sided sciatica  Comments:  Improved, but still with numbness in the left leg (had steroid taper recently)  Will continue w/u for the pt, once she clears COV-19  VIRTUAL VISIT DISCLAIMER    Amisha Gentile acknowledges that she has consented to an online visit or consultation  She understands that the online visit is based solely on information provided by her, and that, in the absence of a face-to-face physical evaluation by the physician, the diagnosis she receives is both limited and provisional in terms of accuracy and completeness  This is not intended to replace a full medical face-to-face evaluation by the physician  Amisha Gentile understands and accepts these terms

## 2021-03-22 ENCOUNTER — TELEMEDICINE (OUTPATIENT)
Dept: FAMILY MEDICINE CLINIC | Facility: CLINIC | Age: 51
End: 2021-03-22
Payer: COMMERCIAL

## 2021-03-22 DIAGNOSIS — U07.1 COVID-19: Primary | ICD-10-CM

## 2021-03-22 PROCEDURE — 99213 OFFICE O/P EST LOW 20 MIN: CPT | Performed by: NURSE PRACTITIONER

## 2021-03-22 PROCEDURE — 3725F SCREEN DEPRESSION PERFORMED: CPT | Performed by: NURSE PRACTITIONER

## 2021-03-22 NOTE — PROGRESS NOTES
COVID-19 Virtual Visit     Assessment/Plan:    Problem List Items Addressed This Visit        Other    COVID-19 - Primary         Disposition:     I recommended continued isolation until at least 24 hours have passed since recovery defined as resolution of fever without the use of fever-reducing medications AND improvement in COVID symptoms AND 10 days have passed since onset of symptoms (or 10 days have passed since date of first positive viral diagnostic test for asymptomatic patients)  I have spent 15 minutes directly with the patient  Greater than 50% of this time was spent in counseling/coordination of care regarding: diagnostic results, prognosis, risks and benefits of treatment options, instructions for management, patient and family education, importance of treatment compliance, risk factor reductions and impressions  [address]    Your healthcare provider and/or public health staff have evaluated you and have determined that you do not need to remain in the hospital at this time  At this time you can be isolated at home where you will be monitored by staff from your local or state health department  You should carefully follow the prevention and isolation steps below until a healthcare provider or local or state health department says that you can return to your normal activities  Stay home except to get medical care    People who are mildly ill with COVID-19 are able to isolate at home during their illness  You should restrict activities outside your home, except for getting medical care  Do not go to work, school, or public areas  Avoid using public transportation, ride-sharing, or taxis  Separate yourself from other people and animals in your home    People: As much as possible, you should stay in a specific room and away from other people in your home  Also, you should use a separate bathroom, if available  Animals:  You should restrict contact with pets and other animals while you are sick with COVID-19, just like you would around other people  Although there have not been reports of pets or other animals becoming sick with COVID-19, it is still recommended that people sick with COVID-19 limit contact with animals until more information is known about the virus  When possible, have another member of your household care for your animals while you are sick  If you are sick with COVID-19, avoid contact with your pet, including petting, snuggling, being kissed or licked, and sharing food  If you must care for your pet or be around animals while you are sick, wash your hands before and after you interact with pets and wear a facemask  See COVID-19 and Animals for more information  Call ahead before visiting your doctor    If you have a medical appointment, call the healthcare provider and tell them that you have or may have COVID-19  This will help the healthcare providers office take steps to keep other people from getting infected or exposed  Wear a facemask    You should wear a facemask when you are around other people (e g , sharing a room or vehicle) or pets and before you enter a healthcare providers office  If you are not able to wear a facemask (for example, because it causes trouble breathing), then people who live with you should not stay in the same room with you, or they should wear a facemask if they enter your room  Cover your coughs and sneezes    Cover your mouth and nose with a tissue when you cough or sneeze  Throw used tissues in a lined trash can  Immediately wash your hands with soap and water for at least 20 seconds or, if soap and water are not available, clean your hands with an alcohol-based hand  that contains at least 60% alcohol      Clean your hands often    Wash your hands often with soap and water for at least 20 seconds, especially after blowing your nose, coughing, or sneezing; going to the bathroom; and before eating or preparing food  If soap and water are not readily available, use an alcohol-based hand  with at least 60% alcohol, covering all surfaces of your hands and rubbing them together until they feel dry  Soap and water are the best option if hands are visibly dirty  Avoid touching your eyes, nose, and mouth with unwashed hands  Avoid sharing personal household items    You should not share dishes, drinking glasses, cups, eating utensils, towels, or bedding with other people or pets in your home  After using these items, they should be washed thoroughly with soap and water  Clean all high-touch surfaces everyday    High touch surfaces include counters, tabletops, doorknobs, bathroom fixtures, toilets, phones, keyboards, tablets, and bedside tables  Also, clean any surfaces that may have blood, stool, or body fluids on them  Use a household cleaning spray or wipe, according to the label instructions  Labels contain instructions for safe and effective use of the cleaning product including precautions you should take when applying the product, such as wearing gloves and making sure you have good ventilation during use of the product  Monitor your symptoms    Seek prompt medical attention if your illness is worsening (e g , difficulty breathing)  Before seeking care, call your healthcare provider and tell them that you have, or are being evaluated for, COVID-19  Put on a facemask before you enter the facility  These steps will help the healthcare providers office to keep other people in the office or waiting room from getting infected or exposed  Ask your healthcare provider to call the local or state health department  Persons who are placed under active monitoring or facilitated self-monitoring should follow instructions provided by their local health department or occupational health professionals, as appropriate    If you have a medical emergency and need to call 911, notify the dispatch personnel that you have, or are being evaluated for COVID-19  If possible, put on a facemask before emergency medical services arrive  Discontinuing home isolation    Patients with confirmed COVID-19 should remain under home isolation precautions until the following conditions are met:   - They have had no fever for at least 24 hours (that is one full day of no fever without the use medicine that reduces fevers)  AND  - other symptoms have improved (for example, when their cough or shortness of breath have improved)  AND  - If had mild or moderate illness, at least 10 days have passed since their symptoms first appeared or if severe illness (needed oxygen) or immunosuppressed, at least 20 days have passed since symptoms first appeared  Patients with confirmed COVID-19 should also notify close contacts (including their workplace) and ask that they self-quarantine  Currently, close contact is defined as being within 6 feet for 15 minutes or more from the period 24 hours starting 48 hours before symptom onset to the time at which the patient went into isolation  Close contacts of patients diagnosed with COVID-19 should be instructed by the patient to self-quarantine for 14 days from the last time of their last contact with the patient       Source: [employer]  Encounter provider ELVIRA Calvillo    Provider located at [address] [place] [id number]    Recent Visits  Date Type Provider Dept   03/18/21 Telemedicine [address]   03/16/21 Telephone DO Fazal Pickeringa [facility]   03/16/21 Telemedicine DO Fazal Pickeringa [facility]   Showing recent visits within past 7 days and meeting all other requirements     Today's Visits  Date Type Provider Dept   03/22/21 Telemedicine ELVIRA Calvillo [facility]   Showing today's visits and meeting all other requirements     Future Appointments  No visits were found meeting these conditions  Showing future appointments within next 150 days and meeting all other requirements      This virtual check-in was done via [employer] and patient was informed that this is a secure, HIPAA-compliant platform  She agrees to proceed  Patient agrees to participate in a virtual check in via telephone or video visit instead of presenting to the office to address urgent/immediate medical needs  Patient is aware this is a billable service  After connecting through [facility], the patient was identified by name and date of birth  Olivier Alaniz was informed that this was a telemedicine visit and that the exam was being conducted confidentially over secure lines  My office door was closed  No one else was in the room  Olivier Alaniz acknowledged consent and understanding of privacy and security of the telemedicine visit  I informed the patient that I have reviewed her record in Epic and presented the opportunity for her to ask any questions regarding the visit today  The patient agreed to participate  Subjective:   Olivier Alaniz is a 45 y o  female who has been screened for COVID-19  Symptom change since last report: resolving  Patient's symptoms include fatigue, malaise, nasal congestion, rhinorrhea, anosmia, loss of taste, cough, shortness of breath, chest tightness and nausea  Patient denies fever, chills, sore throat, abdominal pain, vomiting, diarrhea, myalgias and headaches  Adamaris Ivan has been staying home and has isolated themselves in her home  She is taking care to not share personal items and is cleaning all surfaces that are touched often, like counters, tabletops, and doorknobs using household cleaning sprays or wipes  She is wearing a mask when she leaves her room       Date of symptom onset: 3/7/2021  Date of positive COVID-19 PCR: 3/8/2021    Monoclonal Antibody Follow-up Symptom Questionnaire  I feel overall: much better  My breathing is: similar  My fever is: better  My fatigue is: somewhat better    Red blotchy face since infusion  Thursday last time ran fever, 102  Sick since 3/7/21        Lab Results   Component Value Date    SARSCOV2 Positive (A) 03/08/2021    SARSCOV2 Not Detected 10/22/2020     Past Medical History:   Diagnosis Date    Anxiety     Depression      Past Surgical History:   Procedure Laterality Date    ROTATOR CUFF REPAIR       Current Outpatient Medications   Medication Sig Dispense Refill    Acetaminophen (TYLENOL ARTHRITIS PAIN PO) Take by mouth      Ascorbic Acid (vitamin C) 1000 MG tablet Take 1,000 mg by mouth daily      cholecalciferol (VITAMIN D3) 1,000 units tablet Take 2,000 Units by mouth      cyclobenzaprine (FLEXERIL) 5 mg tablet Take 1 tablet (5 mg total) by mouth 3 (three) times a day as needed for muscle spasms 40 tablet 1    diclofenac (VOLTAREN) 75 mg EC tablet Take 1 tablet (75 mg total) by mouth 2 (two) times a day with meals 60 tablet 3    folic acid (FOLVITE) 1 mg tablet Take 1 tablet (1 mg total) by mouth daily 30 tablet 5    hydroxychloroquine (PLAQUENIL) 200 mg tablet Take 1 tablet (200 mg total) by mouth 2 (two) times a day 60 tablet 3    magnesium gluconate (MAGONATE) 500 mg tablet Take 500 mg by mouth daily      methotrexate 2 5 mg tablet 8 tablet po weekly (take all 8 tablets on the same day every week) 40 tablet 3    multivitamin (THERAGRAN) TABS Take 1 tablet by mouth daily      Omega-3 Fatty Acids (Fish Oil Concentrate) 300 MG CAPS Take 1 capsule by mouth      oxyCODONE (ROXICODONE) 5 mg immediate release tablet Take 1 tablet (5 mg total) by mouth every 6 (six) hours as needed for moderate pain or severe painMax Daily Amount: 20 mg 20 tablet 0    predniSONE 10 mg tablet Take 1 tablet (10 mg total) by mouth daily as needed (joint flare) 30 tablet 2    pyridoxine (B-6) 100 MG tablet Take 100 mg by mouth      TRAMADOL HCL PO Take by mouth 50mg as needed      vitamin B-12 (VITAMIN B-12) 1,000 mcg tablet Take by mouth daily      zinc gluconate 50 mg tablet Take 50 mg by mouth daily       No current facility-administered medications for this visit  Allergies   Allergen Reactions    Clarithromycin GI Intolerance       Review of Systems   Constitutional: Positive for fatigue  Negative for chills and fever  HENT: Positive for congestion and rhinorrhea  Negative for sore throat  Eyes: Negative for photophobia and visual disturbance  Respiratory: Positive for cough, chest tightness and shortness of breath  Cardiovascular: Negative for chest pain and palpitations  Gastrointestinal: Positive for nausea  Negative for abdominal pain, diarrhea and vomiting  Genitourinary: Negative for dysuria and frequency  Musculoskeletal: Negative for arthralgias and myalgias  Skin: Negative for rash  Neurological: Negative for dizziness, light-headedness and headaches  Hematological: Negative for adenopathy  Psychiatric/Behavioral: Negative for dysphoric mood and sleep disturbance  Objective: There were no vitals filed for this visit  Physical Exam  Constitutional:       Appearance: Normal appearance  HENT:      Head: Normocephalic and atraumatic  Eyes:      Conjunctiva/sclera: Conjunctivae normal    Neck:      Musculoskeletal: Normal range of motion  Pulmonary:      Effort: Pulmonary effort is normal    Musculoskeletal: Normal range of motion  Neurological:      Mental Status: She is alert and oriented to person, place, and time  Cranial Nerves: Cranial nerve deficit present  Psychiatric:         Mood and Affect: Mood normal          Behavior: Behavior normal      BMI Counseling: There is no height or weight on file to calculate BMI   The BMI is above normal  Nutrition recommendations include reducing portion sizes, decreasing overall calorie intake, 3-5 servings of fruits/vegetables daily, reducing fast food intake, consuming healthier snacks, decreasing soda and/or juice intake, moderation in carbohydrate intake, increasing intake of lean protein, reducing intake of saturated fat and trans fat and reducing intake of cholesterol  Exercise recommendations include moderate aerobic physical activity for 150 minutes/week, exercising 3-5 times per week and strength training exercises  VIRTUAL VISIT DISCLAIMER    Nasra Samayoa acknowledges that she has consented to an online visit or consultation  She understands that the online visit is based solely on information provided by her, and that, in the absence of a face-to-face physical evaluation by the physician, the diagnosis she receives is both limited and provisional in terms of accuracy and completeness  This is not intended to replace a full medical face-to-face evaluation by the physician  Nasra Samayoa understands and accepts these terms

## 2021-03-25 ENCOUNTER — TELEMEDICINE (OUTPATIENT)
Dept: FAMILY MEDICINE CLINIC | Facility: CLINIC | Age: 51
End: 2021-03-25
Payer: COMMERCIAL

## 2021-03-25 DIAGNOSIS — U07.1 COVID-19: Primary | ICD-10-CM

## 2021-03-25 PROCEDURE — 99213 OFFICE O/P EST LOW 20 MIN: CPT | Performed by: FAMILY MEDICINE

## 2021-03-25 NOTE — PROGRESS NOTES
COVID-19 Virtual Visit     Assessment/Plan:    Problem List Items Addressed This Visit        Other    COVID-19 - Primary      [address]     Disposition:     I recommended continued isolation until at least 24 hours have passed since recovery defined as resolution of fever without the use of fever-reducing medications AND improvement in COVID symptoms AND 10 days have passed since onset of symptoms (or 10 days have passed since date of first positive viral diagnostic test for asymptomatic patients)  I have spent 15 minutes directly with the patient  Greater than 50% of this time was spent in counseling/coordination of care regarding: prognosis, risks and benefits of treatment options, instructions for management, patient and family education, importance of treatment compliance, risk factor reductions and impressions  Encounter provider 126 Paul Choi DO    Provider located at [address] [id number]    Recent Visits  Date Type Provider Dept   03/22/21 4930 Tommy Mckeon, [address] [facility]   03/18/21 Telemedicine Gonzalo Garcia DO [facility]   Showing recent visits within past 7 days and meeting all other requirements     Today's Visits  Date Type Provider Dept   03/25/21 Telemedicine Gonzalo Garcia DO [facility]   Showing today's visits and meeting all other requirements     Future Appointments  No visits were found meeting these conditions  Showing future appointments within next 150 days and meeting all other requirements      This virtual check-in was done via [employer] and patient was informed that this is a secure, HIPAA-compliant platform  She agrees to proceed  Patient agrees to participate in a virtual check in via telephone or video visit instead of presenting to the office to address urgent/immediate medical needs  Patient is aware this is a billable service      After connecting through [employer], the patient was identified by name and date of birth  Kay Emanuel was informed that this was a telemedicine visit and that the exam was being conducted confidentially over secure lines  My office door was closed  No one else was in the room  Kay Emanuel acknowledged consent and understanding of privacy and security of the telemedicine visit  I informed the patient that I have reviewed her record in Epic and presented the opportunity for her to ask any questions regarding the visit today  The patient agreed to participate  Subjective:   Kay Emanuel is a 45 y o  female who has been screened for COVID-19  Symptom change since last report: improving  Patient's symptoms include fatigue and diarrhea  Patient denies fever and shortness of breath  Nyla Arredondo has been staying home and has isolated themselves in her home  She is taking care to not share personal items and is cleaning all surfaces that are touched often, like counters, tabletops, and doorknobs using household cleaning sprays or wipes  She is wearing a mask when she leaves her room  Date of symptom onset: 3/7/2021  Date of positive COVID-19 PCR: 3/8/2021    Nyla Arredondo is in f/u today - she is finally starting to feel better at this time  Some slight GUSMAN  Fatigue is improving      Lab Results   Component Value Date    SARSCOV2 Positive (A) 03/08/2021    [facility] 98 Not Detected 10/22/2020     Past Medical History:   Diagnosis Date    Anxiety     Depression      Past Surgical History:   Procedure Laterality Date    ROTATOR CUFF REPAIR       Current Outpatient Medications   Medication Sig Dispense Refill    Acetaminophen (TYLENOL ARTHRITIS PAIN PO) Take by mouth      Ascorbic Acid (vitamin C) 1000 MG tablet Take 1,000 mg by mouth daily      cholecalciferol (VITAMIN D3) 1,000 units tablet Take 2,000 Units by mouth      cyclobenzaprine (FLEXERIL) 5 mg tablet Take 1 tablet (5 mg total) by mouth 3 (three) times a day as needed for muscle spasms 40 tablet 1    diclofenac (VOLTAREN) 75 mg EC tablet Take 1 tablet (75 mg total) by mouth 2 (two) times a day with meals 60 tablet 3    folic acid (FOLVITE) 1 mg tablet Take 1 tablet (1 mg total) by mouth daily 30 tablet 5    hydroxychloroquine (PLAQUENIL) 200 mg tablet Take 1 tablet (200 mg total) by mouth 2 (two) times a day 60 tablet 3    magnesium gluconate (MAGONATE) 500 mg tablet Take 500 mg by mouth daily      methotrexate 2 5 mg tablet 8 tablet po weekly (take all 8 tablets on the same day every week) 40 tablet 3    multivitamin (THERAGRAN) TABS Take 1 tablet by mouth daily      Omega-3 Fatty Acids (Fish Oil Concentrate) 300 MG CAPS Take 1 capsule by mouth      oxyCODONE (ROXICODONE) 5 mg immediate release tablet Take 1 tablet (5 mg total) by mouth every 6 (six) hours as needed for moderate pain or severe painMax Daily Amount: 20 mg 20 tablet 0    predniSONE 10 mg tablet Take 1 tablet (10 mg total) by mouth daily as needed (joint flare) 30 tablet 2    pyridoxine (B-6) 100 MG tablet Take 100 mg by mouth      TRAMADOL HCL PO Take by mouth 50mg as needed      vitamin B-12 (VITAMIN B-12) 1,000 mcg tablet Take by mouth daily      zinc gluconate 50 mg tablet Take 50 mg by mouth daily       No current facility-administered medications for this visit  Allergies   Allergen Reactions    Clarithromycin GI Intolerance       Review of Systems   Constitutional: Positive for fatigue  Negative for fever  Respiratory: Negative for shortness of breath  Gastrointestinal: Positive for diarrhea  Objective: There were no vitals filed for this visit  Physical Exam  Constitutional:       General: She is not in acute distress  Appearance: Normal appearance  She is not ill-appearing or toxic-appearing  HENT:      Head: Normocephalic and atraumatic  Eyes:      General: No scleral icterus       Conjunctiva/sclera: Conjunctivae normal    Pulmonary:      Effort: Pulmonary effort is normal  No respiratory distress  Neurological:      Mental Status: She is alert and oriented to person, place, and time  Psychiatric:         Mood and Affect: Mood normal          Behavior: Behavior normal          Thought Content: Thought content normal          Judgment: Judgment normal      Mary Lama was seen today for covid-19  Diagnoses and all orders for this visit:    COVID-19  Comments:  Improved, getting better slowly  Rest, hydrate, slowly increase activities  Continue self-isolation  VIRTUAL VISIT DISCLAIMER    Dilma Toribio acknowledges that she has consented to an online visit or consultation  She understands that the online visit is based solely on information provided by her, and that, in the absence of a face-to-face physical evaluation by the physician, the diagnosis she receives is both limited and provisional in terms of accuracy and completeness  This is not intended to replace a full medical face-to-face evaluation by the physician  Dilma Toribio understands and accepts these terms

## 2021-03-29 ENCOUNTER — DOCUMENTATION (OUTPATIENT)
Dept: FAMILY MEDICINE CLINIC | Facility: CLINIC | Age: 51
End: 2021-03-29

## 2021-03-29 ENCOUNTER — TELEMEDICINE (OUTPATIENT)
Dept: FAMILY MEDICINE CLINIC | Facility: CLINIC | Age: 51
End: 2021-03-29
Payer: COMMERCIAL

## 2021-03-29 VITALS — TEMPERATURE: 98.7 F

## 2021-03-29 DIAGNOSIS — U07.1 COVID-19: Primary | ICD-10-CM

## 2021-03-29 DIAGNOSIS — M54.42 ACUTE LEFT-SIDED LOW BACK PAIN WITH LEFT-SIDED SCIATICA: ICD-10-CM

## 2021-03-29 PROCEDURE — 99213 OFFICE O/P EST LOW 20 MIN: CPT | Performed by: FAMILY MEDICINE

## 2021-03-29 PROCEDURE — 1036F TOBACCO NON-USER: CPT | Performed by: FAMILY MEDICINE

## 2021-03-29 NOTE — LETTER
March 29, 2021     Patient: Chitra Dunn   YOB: 1970   Date of Visit: 3/29/2021       To Whom It May Concern:    Patient is under my care and was seen virtually today in my office  It is my medical opinion that Chitra Dunn may return to participation on 03/30/2021 with no restrictions  If you have any questions or concerns, please don't hesitate to call           Sincerely,        Leonie Fierro, DO

## 2021-03-29 NOTE — PROGRESS NOTES
COVID-19 Virtual Visit     Assessment/Plan:    Problem List Items Addressed This Visit        Other    COVID-19 - Primary      Other Visit Diagnoses     Acute left-sided low back pain with left-sided sciatica        Also overall much better (no back pain now), but with some lingering numbness in the left foot - will re-evaluate pt in the upcoming weeks  [id number]     Disposition:     I have spent 15 minutes directly with the patient  Greater than 50% of this time was spent in counseling/coordination of care regarding: prognosis, risks and benefits of treatment options, instructions for management, patient and family education, importance of treatment compliance, risk factor reductions and impressions  Encounter provider Susu Hurtado DO    Provider located at [address] [id number]    Recent Visits  Date Type Provider Dept   03/25/21 Telemedicine Susu Hurtado[address]   03/22/21 [address] [facility]   Showing recent visits within past 7 days and meeting all other requirements     Today's Visits  Date Type Provider Dept   03/29/21 Telemedicine DO [facility]   Showing today's visits and meeting all other requirements     Future Appointments  No visits were found meeting these conditions  Showing future appointments within next 150 days and meeting all other requirements      This virtual check-in was done via [employer] and patient was informed that this is a secure, HIPAA-compliant platform  She agrees to proceed  Patient agrees to participate in a virtual check in via telephone or video visit instead of presenting to the office to address urgent/immediate medical needs  Patient is aware this is a billable service  After connecting through [facility], the patient was identified by name and date of birth   Jeana Ventura was informed that this was a telemedicine visit and that the exam was being conducted confidentially over secure lines  My office door was closed  No one else was in the room  Radha Mayberry acknowledged consent and understanding of privacy and security of the telemedicine visit  I informed the patient that I have reviewed her record in Epic and presented the opportunity for her to ask any questions regarding the visit today  The patient agreed to participate  Subjective:   Radha Mayberry is a 45 y o  female who has been screened for COVID-19  Symptom change since last report: resolving  Patient's symptoms include nasal congestion and diarrhea  Patient denies fever, fatigue, anosmia, loss of taste, cough and shortness of breath  Deyanira Verdugo has been staying home and has isolated themselves in her home  She is taking care to not share personal items and is cleaning all surfaces that are touched often, like counters, tabletops, and doorknobs using household cleaning sprays or wipes  She is wearing a mask when she leaves her room  Date of symptom onset: 3/7/2021  Date of positive COVID-19 PCR: 3/8/2021    Deyanira Verdugo is feeling much better, diarrhea resolving, and wants to return to work        Lab Results   Component Value Date    SARSCOV2 Positive (A) 03/08/2021    Waynette Blew Not Detected 10/22/2020     Past Medical History:   Diagnosis Date    Anxiety     Depression      Past Surgical History:   Procedure Laterality Date    ROTATOR CUFF REPAIR       Current Outpatient Medications   Medication Sig Dispense Refill    Acetaminophen (TYLENOL ARTHRITIS PAIN PO) Take by mouth      Ascorbic Acid (vitamin C) 1000 MG tablet Take 1,000 mg by mouth daily      cholecalciferol (VITAMIN D3) 1,000 units tablet Take 2,000 Units by mouth      cyclobenzaprine (FLEXERIL) 5 mg tablet Take 1 tablet (5 mg total) by mouth 3 (three) times a day as needed for muscle spasms 40 tablet 1    diclofenac (VOLTAREN) 75 mg EC tablet Take 1 tablet (75 mg total) by mouth 2 (two) times a day with meals 60 tablet 3    folic acid (FOLVITE) 1 mg tablet Take 1 tablet (1 mg total) by mouth daily 30 tablet 5    hydroxychloroquine (PLAQUENIL) 200 mg tablet Take 1 tablet (200 mg total) by mouth 2 (two) times a day 60 tablet 3    magnesium gluconate (MAGONATE) 500 mg tablet Take 500 mg by mouth daily      multivitamin (THERAGRAN) TABS Take 1 tablet by mouth daily      Omega-3 Fatty Acids (Fish Oil Concentrate) 300 MG CAPS Take 1 capsule by mouth      oxyCODONE (ROXICODONE) 5 mg immediate release tablet Take 1 tablet (5 mg total) by mouth every 6 (six) hours as needed for moderate pain or severe painMax Daily Amount: 20 mg 20 tablet 0    predniSONE 10 mg tablet Take 1 tablet (10 mg total) by mouth daily as needed (joint flare) 30 tablet 2    pyridoxine (B-6) 100 MG tablet Take 100 mg by mouth      TRAMADOL HCL PO Take by mouth 50mg as needed      vitamin B-12 (VITAMIN B-12) 1,000 mcg tablet Take by mouth daily      zinc gluconate 50 mg tablet Take 50 mg by mouth daily      methotrexate 2 5 mg tablet 8 tablet po weekly (take all 8 tablets on the same day every week) (Patient not taking: Reported on 3/25/2021) 40 tablet 3     No current facility-administered medications for this visit  Allergies   Allergen Reactions    Clarithromycin GI Intolerance       Review of Systems   Constitutional: Negative for fatigue and fever  HENT: Positive for congestion  Respiratory: Negative for cough and shortness of breath  No GUSMAN  Cardiovascular: Negative for chest pain  Gastrointestinal: Positive for diarrhea  Musculoskeletal: Negative for back pain  Neurological: Positive for numbness  Still some lingering numbness in the 4th and 5th toes, left foot  Objective:    Vitals:    03/29/21 1113   Temp: 98 7 °F (37 1 °C)       Physical Exam  Vitals signs reviewed  Constitutional:       General: She is not in acute distress  Appearance: Normal appearance   She is not ill-appearing, toxic-appearing or diaphoretic  HENT:      Head: Normocephalic and atraumatic  Eyes:      General: No scleral icterus  Conjunctiva/sclera: Conjunctivae normal    Pulmonary:      Effort: Pulmonary effort is normal  No respiratory distress  Neurological:      Mental Status: She is alert and oriented to person, place, and time  Psychiatric:         Mood and Affect: Mood normal          Behavior: Behavior normal          Thought Content: Thought content normal          Judgment: Judgment normal           Oskar Guevara was seen today for virtual brief visit and covid-19  Diagnoses and all orders for this visit:    COVID-19  Comments:  Pt is doing well, & cleared from self-isolation  Continue mask-wearing in public, slowly return to activities, etc  Work note  Precautions given  Acute left-sided low back pain with left-sided sciatica  Comments:  Also overall much better (no back pain now), but with some lingering numbness in the left foot - will re-evaluate pt in the upcoming weeks  VIRTUAL VISIT DISCLAIMER    Chitra uDnn acknowledges that she has consented to an online visit or consultation  She understands that the online visit is based solely on information provided by her, and that, in the absence of a face-to-face physical evaluation by the physician, the diagnosis she receives is both limited and provisional in terms of accuracy and completeness  This is not intended to replace a full medical face-to-face evaluation by the physician  Chitra Dunn understands and accepts these terms

## 2021-04-08 DIAGNOSIS — M05.79 RHEUMATOID ARTHRITIS INVOLVING MULTIPLE SITES WITH POSITIVE RHEUMATOID FACTOR (HCC): ICD-10-CM

## 2021-04-08 RX ORDER — HYDROXYCHLOROQUINE SULFATE 200 MG/1
200 TABLET, FILM COATED ORAL 2 TIMES DAILY
Qty: 60 TABLET | Refills: 3 | Status: SHIPPED | OUTPATIENT
Start: 2021-04-08 | End: 2021-07-14 | Stop reason: SDUPTHER

## 2021-04-09 ENCOUNTER — TELEPHONE (OUTPATIENT)
Dept: RHEUMATOLOGY | Facility: CLINIC | Age: 51
End: 2021-04-09

## 2021-04-09 NOTE — TELEPHONE ENCOUNTER
----- Message from Cullen Flores MD sent at 4/8/2021  5:37 PM EDT -----  Regarding: RE: Medication refill  Please let her know I sent refills  ----- Message -----  From: Elsi Morrisbry  Sent: 4/8/2021   3:27 PM EDT  To: Stacey Umaña MA, Cullen Flores MD  Subject: Medication refill                                Patient stopped into [facility] for her appointment  She had her days mixed up, her appt is on 4/14/21  She is asking if a refill for her hydroxychloroquine be sent to the [facility] on [address]  She has enough for tomorrow, 4/9/21 after that she will be completley out  Could someone address this with the patient   Thank You

## 2021-04-13 ENCOUNTER — RA CDI HCC (OUTPATIENT)
Dept: OTHER | Facility: HOSPITAL | Age: 51
End: 2021-04-13

## 2021-04-13 NOTE — PROGRESS NOTES
[facility]  coding opportunities          Chart reviewed, no opportunity found: CHART REVIEWED, NO OPPORTUNITY FOUND              Patients insurance company: [employer] (Medicare and Commercial for [employer] and [employer])

## 2021-04-14 ENCOUNTER — TELEPHONE (OUTPATIENT)
Dept: OTHER | Facility: OTHER | Age: 51
End: 2021-04-14

## 2021-04-14 ENCOUNTER — OFFICE VISIT (OUTPATIENT)
Dept: RHEUMATOLOGY | Facility: CLINIC | Age: 51
End: 2021-04-14
Payer: COMMERCIAL

## 2021-04-14 ENCOUNTER — HOSPITAL ENCOUNTER (OUTPATIENT)
Dept: RADIOLOGY | Facility: HOSPITAL | Age: 51
Discharge: HOME | End: 2021-04-14
Attending: INTERNAL MEDICINE
Payer: COMMERCIAL

## 2021-04-14 ENCOUNTER — APPOINTMENT (OUTPATIENT)
Dept: LAB | Facility: CLINIC | Age: 51
End: 2021-04-14
Payer: COMMERCIAL

## 2021-04-14 VITALS — DIASTOLIC BLOOD PRESSURE: 80 MMHG | SYSTOLIC BLOOD PRESSURE: 120 MMHG

## 2021-04-14 DIAGNOSIS — M05.79 RHEUMATOID ARTHRITIS INVOLVING MULTIPLE SITES WITH POSITIVE RHEUMATOID FACTOR (HCC): Primary | ICD-10-CM

## 2021-04-14 DIAGNOSIS — M05.79 RHEUMATOID ARTHRITIS INVOLVING MULTIPLE SITES WITH POSITIVE RHEUMATOID FACTOR (HCC): ICD-10-CM

## 2021-04-14 DIAGNOSIS — M25.462 PAIN AND SWELLING OF LEFT KNEE: ICD-10-CM

## 2021-04-14 DIAGNOSIS — R20.0 LEFT LEG NUMBNESS: ICD-10-CM

## 2021-04-14 DIAGNOSIS — M25.50 ARTHRALGIA, UNSPECIFIED JOINT: ICD-10-CM

## 2021-04-14 DIAGNOSIS — M25.562 PAIN AND SWELLING OF LEFT KNEE: ICD-10-CM

## 2021-04-14 DIAGNOSIS — M19.90 ARTHRITIS: ICD-10-CM

## 2021-04-14 DIAGNOSIS — Z79.899 HIGH RISK MEDICATION USE: ICD-10-CM

## 2021-04-14 LAB
ALBUMIN SERPL BCP-MCNC: 3.6 G/DL (ref 3.5–5)
ALP SERPL-CCNC: 73 U/L (ref 46–116)
ALT SERPL W P-5'-P-CCNC: 29 U/L (ref 12–78)
ANION GAP SERPL CALCULATED.3IONS-SCNC: 9 MMOL/L (ref 4–13)
AST SERPL W P-5'-P-CCNC: 14 U/L (ref 5–45)
BASOPHILS # BLD AUTO: 0.08 THOUSANDS/ΜL (ref 0–0.1)
BASOPHILS NFR BLD AUTO: 1 % (ref 0–1)
BILIRUB SERPL-MCNC: 0.22 MG/DL (ref 0.2–1)
BUN SERPL-MCNC: 14 MG/DL (ref 5–25)
CALCIUM SERPL-MCNC: 9 MG/DL (ref 8.3–10.1)
CHLORIDE SERPL-SCNC: 103 MMOL/L (ref 100–108)
CO2 SERPL-SCNC: 28 MMOL/L (ref 21–32)
CREAT SERPL-MCNC: 0.66 MG/DL (ref 0.6–1.3)
CRP SERPL QL: 12.9 MG/L
EOSINOPHIL # BLD AUTO: 0.16 THOUSAND/ΜL (ref 0–0.61)
EOSINOPHIL NFR BLD AUTO: 2 % (ref 0–6)
ERYTHROCYTE [DISTWIDTH] IN BLOOD BY AUTOMATED COUNT: 13.1 % (ref 11.6–15.1)
ERYTHROCYTE [SEDIMENTATION RATE] IN BLOOD: 16 MM/HOUR (ref 0–29)
GFR SERPL CREATININE-BSD FRML MDRD: 102 ML/MIN/1.73SQ M
GLUCOSE SERPL-MCNC: 121 MG/DL (ref 65–140)
HCT VFR BLD AUTO: 38.5 % (ref 34.8–46.1)
HGB BLD-MCNC: 12.2 G/DL (ref 11.5–15.4)
IMM GRANULOCYTES # BLD AUTO: 0.02 THOUSAND/UL (ref 0–0.2)
IMM GRANULOCYTES NFR BLD AUTO: 0 % (ref 0–2)
LYMPHOCYTES # BLD AUTO: 2.14 THOUSANDS/ΜL (ref 0.6–4.47)
LYMPHOCYTES NFR BLD AUTO: 26 % (ref 14–44)
MCH RBC QN AUTO: 29.5 PG (ref 26.8–34.3)
MCHC RBC AUTO-ENTMCNC: 31.7 G/DL (ref 31.4–37.4)
MCV RBC AUTO: 93 FL (ref 82–98)
MONOCYTES # BLD AUTO: 0.73 THOUSAND/ΜL (ref 0.17–1.22)
MONOCYTES NFR BLD AUTO: 9 % (ref 4–12)
NEUTROPHILS # BLD AUTO: 5.07 THOUSANDS/ΜL (ref 1.85–7.62)
NEUTS SEG NFR BLD AUTO: 62 % (ref 43–75)
NRBC BLD AUTO-RTO: 0 /100 WBCS
PLATELET # BLD AUTO: 328 THOUSANDS/UL (ref 149–390)
PMV BLD AUTO: 9.2 FL (ref 8.9–12.7)
POTASSIUM SERPL-SCNC: 3.8 MMOL/L (ref 3.5–5.3)
PROT SERPL-MCNC: 7.8 G/DL (ref 6.4–8.2)
RBC # BLD AUTO: 4.13 MILLION/UL (ref 3.81–5.12)
SODIUM SERPL-SCNC: 140 MMOL/L (ref 136–145)
WBC # BLD AUTO: 8.2 THOUSAND/UL (ref 4.31–10.16)

## 2021-04-14 PROCEDURE — 80053 COMPREHEN METABOLIC PANEL: CPT | Performed by: INTERNAL MEDICINE

## 2021-04-14 PROCEDURE — 86140 C-REACTIVE PROTEIN: CPT | Performed by: INTERNAL MEDICINE

## 2021-04-14 PROCEDURE — 20610 DRAIN/INJ JOINT/BURSA W/O US: CPT | Performed by: INTERNAL MEDICINE

## 2021-04-14 PROCEDURE — 73562 X-RAY EXAM OF KNEE 3: CPT

## 2021-04-14 PROCEDURE — 85652 RBC SED RATE AUTOMATED: CPT | Performed by: INTERNAL MEDICINE

## 2021-04-14 PROCEDURE — 85025 COMPLETE CBC W/AUTO DIFF WBC: CPT | Performed by: INTERNAL MEDICINE

## 2021-04-14 PROCEDURE — 36415 COLL VENOUS BLD VENIPUNCTURE: CPT | Performed by: INTERNAL MEDICINE

## 2021-04-14 PROCEDURE — 99215 OFFICE O/P EST HI 40 MIN: CPT | Performed by: INTERNAL MEDICINE

## 2021-04-14 RX ORDER — FOLIC ACID 1 MG/1
1 TABLET ORAL DAILY
Qty: 30 TABLET | Refills: 5 | Status: SHIPPED | OUTPATIENT
Start: 2021-04-14 | End: 2021-07-14 | Stop reason: SDUPTHER

## 2021-04-14 RX ORDER — TRIAMCINOLONE ACETONIDE 40 MG/ML
40 INJECTION, SUSPENSION INTRA-ARTICULAR; INTRAMUSCULAR ONCE
Status: COMPLETED | OUTPATIENT
Start: 2021-04-14 | End: 2021-04-14

## 2021-04-14 RX ORDER — BUPIVACAINE HYDROCHLORIDE 2.5 MG/ML
1 INJECTION, SOLUTION EPIDURAL; INFILTRATION; INTRACAUDAL ONCE
Status: COMPLETED | OUTPATIENT
Start: 2021-04-14 | End: 2021-04-14

## 2021-04-14 RX ORDER — DICLOFENAC SODIUM 75 MG/1
75 TABLET, DELAYED RELEASE ORAL 2 TIMES DAILY
Qty: 60 TABLET | Refills: 3 | Status: SHIPPED | OUTPATIENT
Start: 2021-04-14 | End: 2021-07-14 | Stop reason: SDUPTHER

## 2021-04-14 RX ORDER — GABAPENTIN 100 MG/1
100 CAPSULE ORAL 3 TIMES DAILY
Qty: 90 CAPSULE | Refills: 0 | Status: SHIPPED | OUTPATIENT
Start: 2021-04-14 | End: 2021-07-14

## 2021-04-14 RX ADMIN — TRIAMCINOLONE ACETONIDE 40 MG: 40 INJECTION, SUSPENSION INTRA-ARTICULAR; INTRAMUSCULAR at 16:02

## 2021-04-14 RX ADMIN — BUPIVACAINE HYDROCHLORIDE 1 ML: 2.5 INJECTION, SOLUTION EPIDURAL; INFILTRATION; INTRACAUDAL at 16:03

## 2021-04-14 NOTE — PATIENT INSTRUCTIONS
Left knee steroid injection given in clinic today  Do knee x-rays  Do labs  Continue same medications  Do eye exam  Can try diclofenac as needed instead of regularly  Take gabapentin up to 3 times a day for left leg numbness    Return to clinic in 3 months    Rheumatoid Arthritis   AMBULATORY CARE:   Rheumatoid arthritis  is a long-term autoimmune disease that causes inflammation and damage to your joints  RA causes your body's immune system to attack the synovial membrane (lining) in your joints  RA can also affect other organs, such as your eyes, heart, or lungs  It may also increase your risk of osteoporosis (weakened bones)  Common symptoms include the following:   · Joint pain and stiffness that lasts longer than 1 hour    · Swollen joints in the same joint on both sides of your body    · Loss of joint movement    · Firm, round nodules (growths) on your joints    · Fatigue or muscle weakness    · Loss of appetite or weight loss    Call your doctor or rheumatologist if:   · You have a fever  · You have increased joint swelling, pain, or redness  · Your skin is itchy, swollen, or has a rash  · Your symptoms are getting worse, even with treatment  · You have questions or concerns about your condition or care  Treatment:  The goal of treatment within the first year is remission (no pain or inflammation)  If full remission cannot be reached, the goal is as few arthritis flares as possible  Early treatment can also help prevent or slow joint damage  Treatment may change after the first year, depending on how your body responds  · Antirheumatics  help slow the progress of RA, and reduce pain, stiffness, and inflammation  · NSAIDs , such as ibuprofen, help decrease swelling, pain, and fever  This medicine is available with or without a doctor's order  NSAIDs can cause stomach bleeding or kidney problems in certain people   If you take blood thinner medicine, always ask your healthcare provider if NSAIDs are safe for you  Always read the medicine label and follow directions  · Steroids  help decrease inflammation  · Biologic therapy  helps decrease joint swelling, pain, and stiffness  These medicines increase the risk of serious infection and need careful monitoring  · Surgery  may be needed to remove all or part of your joint  An implant may be placed to help reduce pain and repair the joint  Manage your symptoms:   · Rest when needed  Rest is important if your joints are painful  Limit your activities until your symptoms improve  Gradually start your normal activities when you can do them without pain  Avoid motions and activities that cause strain on your joints, such as heavy exercise and lifting  · Use ice or heat  Both can help decrease swelling and pain  Ice may also help prevent tissue damage  Use an ice pack, or put crushed ice in a plastic bag  Cover it with a towel and place it on your joint for 15 to 20 minutes every hour or as directed  You can apply heat for 20 minutes every 2 hours  Heat treatment includes hot packs, heat lamps, warm baths, or showers  · Elevate your joint  Elevation helps reduce swelling and pain  Raise your joint above the level of your heart as often as you can  Prop your painful joint on pillows to keep it above your heart comfortably  Manage RA:   · Talk to your healthcare providers about your arthritis medicines  Some medicines may only be needed when you have arthritis pain  You may need to take other medicines every day to prevent arthritis from getting worse  Your healthcare providers will help you understand all your medicines and when to take them  It is important to take the medicines as directed, even if you start to feel better  You can continue to have joint damage and inflammation even if you do not feel it  · Eat a variety of healthy foods    Healthy foods include fruits, vegetables, whole-grain breads, low-fat dairy products, beans, lean meats, and fish  Ask if you need to be on a special diet  A diet rich in calcium and vitamin D may decrease your risk of osteoporosis  Foods high in calcium include milk, cheese, broccoli, and tofu  Vitamin D may be found in meat, fish, fortified milk, cereal and bread  Ask if you need calcium or vitamin D supplements  · Maintain a healthy weight  This may help decrease strain on joints in your back, knees, ankles, and feet  Ask your healthcare provider how much you should weigh  Ask him or her to help you create a weight loss plan if you are overweight  Exercise can help you maintain a healthy weight  · Go to physical or occupational therapy as directed  Physical activity can help relieve pain and stiffness  A physical therapist can teach you exercises to improve flexibility and range of motion  You may also be shown non-weight-bearing exercises that are safe for your joints, such as swimming  An occupational therapist can help you learn to do your daily activities when your joints are stiff or sore  · Do not smoke  Nicotine and other chemicals in cigarettes and cigars can damage your bones and joints  Ask your healthcare provider for information if you currently smoke and need help to quit  E-cigarettes or smokeless tobacco still contain nicotine  Talk to your healthcare provider before you use these products  RA medicines and pregnancy:   · Men:  Talk to your doctor about your RA and the medicines you take  Some medicines may keep your partner from getting pregnant  Talk to your doctor if you and your partner are discussing pregnancy  · Women:  Talk to your gynecologist about contraceptives  Tell him or her about your RA and what medicines you take  He or she will tell you what the best contraceptive for will be  Not all contraceptives are effective if you have RA and are taking certain medicines   You may not be able to breastfeed if you are taking certain RA medicines  Support devices to help manage arthritis:   · Orthotic shoes or insoles  help support your feet when you walk  · Crutches, a cane, or a walker  may help decrease your risk for falling  They also decrease stress on affected joints  · Devices to prevent falls  include raised toilet seats and bathtub bars to help you get up from sitting  Handrails can be placed in areas where you need balance and support  · Devices to help with support and rest  include splints to wear on your hands and a firm pillow while you sleep  Use a pillow that is firm enough to support your neck and head  Ask your healthcare provider about vaccines:  RA or its treatment may increase your risk for infections  Vaccines can help protect you from infections caused by certain bacteria or viruses  Follow up with your healthcare provider as directed:  Write down your questions so you remember to ask them during your visits  © Copyright [address] Information is for End User's use only and may not be sold, redistributed or otherwise used for commercial purposes  All illustrations and images included in CareNotes® are the copyrighted property of A D A M , Inc  or [facility]   The above information is an  only  It is not intended as medical advice for individual conditions or treatments  Talk to your doctor, nurse or pharmacist before following any medical regimen to see if it is safe and effective for you

## 2021-04-14 NOTE — TELEPHONE ENCOUNTER
[phone]/[facility]/[birth year]/Patient went to  prescriptions ordered by Dr Ponce Zhang after her office visit  Pharmacy stated prescription was filled and then cancelled by the office   Patient thinks this was a mistake  /methotrexate 2 5 mg tablet/[facility] [phone]

## 2021-04-14 NOTE — TELEPHONE ENCOUNTER
Please call pharmacy and let them know that I want 3 refills on the methotrexate that dr Wilder Chacon re-prescribed

## 2021-04-14 NOTE — PROGRESS NOTES
Assessment and Plan: Camilo Sparks is a 45 y o   female who presents for follow-up of her seropositive rheumatoid arthritis  Patient mainly complains of left knee pain and swelling lately, worse when she is on her legs  Knee x-rays ordered today reveal severe OA of left knee  Left knee steroid injection given in clinic today, which patient tolerated well  Routine monitoring labs ordered and completed today  Will continue patient on same medications, since her main symptomatic joint is like due to her severe OA in left knee rather than RA, which was addressed to today with steroid injection  Continue methotrexate 20mg po weekly with daily folic acid and hydroxychloroquine 200mg po bid; patient is aware to get regular eye exams  Emphasized to patient that she can take the diclofenac as needed instead of regularly  For her left calf numbness, likely related to her left knee OA, prescribed gabapentin 100mg po tid prn  Plan:  Diagnoses and all orders for this visit:    Rheumatoid arthritis involving multiple sites with positive rheumatoid factor (HCC)  -     folic acid (FOLVITE) 1 mg tablet; Take 1 tablet (1 mg total) by mouth daily  -     methotrexate 2 5 mg tablet; 8 tablet po weekly (take all 8 tablets on the same day every week)  -     Comprehensive metabolic panel  -     CBC and differential  -     C-reactive protein  -     Sedimentation rate, automated    Arthritis  -     XR knee 3 vw left non injury; Future  -     XR knee 3 vw right non injury; Future  -     Large joint arthrocentesis    Pain and swelling of left knee  -     triamcinolone acetonide (KENALOG-40) 40 mg/mL injection 40 mg  -     bupivacaine (PF) (MARCAINE) 0 25 % injection 1 mL  -     Large joint arthrocentesis    High risk medication use - Benefits and risks of hydroxychloroquine, including but not limited to retinal toxicity, corneal deposits, gastrointestinal side effects, and headaches were previously discussed with the patient   She is aware of the need for a regular eye exam to monitor for ocular toxicity while on this medication  Benefits and risks of methotrexate use, including but not limited to gastrointestinal disturbances such as diarrhea, hair loss, fatigue, stomatitis, leukopenia, lung hypersensitivity reaction, hepatotoxicity, and lymphoma were previously discussed with the patient  Baseline viral hepatitis panel was ordered and returned negative  CBC,CMP will be regularly monitored  Patient does not plan on having any children  Patient was prescribed Folic Acid 1 mg po daily to take while on methotrexate to help prevent side effects  Patient counseled on abstinence from alcohol while using methotrexate  -     folic acid (FOLVITE) 1 mg tablet; Take 1 tablet (1 mg total) by mouth daily  -     Comprehensive metabolic panel  -     CBC and differential    Arthralgia, unspecified joint  -     diclofenac (VOLTAREN) 75 mg EC tablet; Take 1 tablet (75 mg total) by mouth 2 (two) times a day Can take as needed for joint pain    Left leg numbness  -     gabapentin (NEURONTIN) 100 mg capsule; Take 1 capsule (100 mg total) by mouth 3 (three) times a day    Large joint arthrocentesis: L knee  Universal Protocol:  Consent: Verbal consent obtained  Written consent not obtained  Risks and benefits: risks, benefits and alternatives were discussed  Consent given by: patient  Time out: Immediately prior to procedure a "time out" was called to verify the correct patient, procedure, equipment, support staff and site/side marked as required    Timeout called at: 4/14/2021 3:30 PM   Patient understanding: patient states understanding of the procedure being performed  Patient consent: the patient's understanding of the procedure matches consent given  Procedure consent: procedure consent matches procedure scheduled  Relevant documents: relevant documents present and verified  Test results: test results available and properly labeled  Site marked: the operative site was marked  Radiology Images displayed and confirmed  If images not available, report reviewed: imaging studies available  Required items: required blood products, implants, devices, and special equipment available  Patient identity confirmed: verbally with patient    Supporting Documentation  Indications: pain and joint swelling   Procedure Details  Location: knee - L knee  Preparation: Patient was prepped and draped in the usual sterile fashion  Needle size: 25 G  Ultrasound guidance: no  Approach: anterolateral    Patient tolerance: patient tolerated the procedure well with no immediate complications  Dressing:  Sterile dressing applied    After discussing the risks/benefits of left knee steroid injection, including minor risk of infection, bleeding, pain, or ineffectiveness, informed consent was obtained and patient was agreeable to proceed  Using sterile technique, the left knee was prepped with Chloraprep, and was topically anesthetized with Ethyl Chloride  The joint was entered using an anterolateral approach and Kenalog 40 mg with 1mL bupivicaine 0 25% was then injected and the needle withdrawn  The procedure was well tolerated  Patient was instructed to call our office with any concerns or questions  Follow-up plan: Return to clinic in 3 months        Rheumatic Disease Summary:  Kathleen Morel a 45 y  o  female who originally presented on 5/13/20 via telemedicine to establish care for her newly diagnosed seropositive (RF+, anti-CCP+) Rheumatoid arthritis   Patient had migratory early RA symptoms, which significantly improved with prednisone   Asked her to continue her prednisone course prescribed by PCP   Initiated her on DMARD therapy with weight-based hydroxychloroquine 200mg po bid; asked patient to get regular eye exams while on this medication to monitor for Plaquenil-related retinal toxicity   Ophthalmology referral made per patient's request  Miguel Brooks chose this more benign DMARD therapy over starting methotrexate during this time in the pandemic   Ordered hand x-rays for patient to do when she gets the chance to have her baseline   Asked patient to call or message clinic with any RA flare symptoms       She presented for follow-up on 08/19/2020  At that time, her RA had not come under control with three months of hydroxychloroquine alone, so added on methotrexate 15mg po weekly with daily folic acid  Bilateral hand x-rays returned unremarkable  Since naproxen and meloxicam had not helped patient's breakthrough joint pain in the past, prescribed diclofenac 75mg po bid prn instead  Kenalog 80mg IM steroid injection administered in clinic to treat her active RA symptoms, which had been affecting her ability to work; filled out [employer] paperwork in clinic (patient works as a prison cook)  She then presented on 11/24/2020 to Dr Billingsley Plan for follow-up  Was having flare-ups with her right knee, and was put on prednisone p r n  at the time  No other medication changes were made  2/3/21:  Patient's rheumatoid arthritis was not coming under control with continued methotrexate 15 mg p o  weekly and hydroxychloroquine 200 mg p o  b i d  She still needed to take prednisone 10mg p r n  for knee flare ups  Decided to increase patient's methotrexate to 20 mg PO weekly with daily folic acid  She was also to continue hydroxychloroquine 200 mg p o  b i d , and would like to continue diclofenac 75 mg p o  b i d  Renewed her prednisone 10 mg p o  daily as needed while awaiting for the higher dose of methotrexate to take effect  In 2 months, planned to discuss whether advance in therapy to triple therapy with addition of sulfasalazine, or addition of a biologic agent is needed at the time  Asked patient to bring up her eye floaters issue to her eye doctor; she had a normal baseline eye exam before starting hydroxychloroquine      HPI   Camilo Sparks is a 45 y o   female who presents for follow-up of her seropositive rheumatoid arthritis  Last clinic visit 2/3/21  Had COVID beginning of March, stopped methotrexate in mid-March and hasn't restarted yet since still has some cough and congestion; does not feel her joint symptoms have worsened  Was treated for COVID with antiviral  Went back to work 2 weeks ago  Continued taking hydroxychloroquine  For past 6 weeks, has been having left leg numbness frin mid-calf down to toes; always numb  Was treated for sciatica after this started; sciatica resolved, but leg numbness stayed  Has not tried gabapentin  Hand arthritis symptoms under control, but left knee still bothering; has 20 minutes morning stiffness in left knee  Left knee swelling and pain is worse the more she's on her legs  Eye exam is coming up  The following portions of the patient's history were reviewed and updated as appropriate: allergies, current medications, past family history, past medical history, past social history, past surgical history and problem list     Review of Systems:   Review of Systems   Constitutional: Negative for fatigue and unexpected weight change  HENT: Negative for mouth sores  Respiratory: Negative for cough and shortness of breath  Gastrointestinal: Negative for constipation and diarrhea  Musculoskeletal: Positive for arthralgias and joint swelling  Negative for back pain and myalgias  Skin: Negative for color change and rash  Neurological: Negative for weakness  Psychiatric/Behavioral: Negative for sleep disturbance         Home Medications:     Current Outpatient Medications:     Acetaminophen (TYLENOL ARTHRITIS PAIN PO), Take by mouth, Disp: , Rfl:     Ascorbic Acid (vitamin C) 1000 MG tablet, Take 1,000 mg by mouth daily, Disp: , Rfl:     cholecalciferol (VITAMIN D3) 1,000 units tablet, Take 2,000 Units by mouth, Disp: , Rfl:     cyclobenzaprine (FLEXERIL) 5 mg tablet, Take 1 tablet (5 mg total) by mouth 3 (three) times a day as needed for muscle spasms (Patient not taking: Reported on 4/19/2021), Disp: 40 tablet, Rfl: 1    diclofenac (VOLTAREN) 75 mg EC tablet, Take 1 tablet (75 mg total) by mouth 2 (two) times a day Can take as needed for joint pain, Disp: 60 tablet, Rfl: 3    folic acid (FOLVITE) 1 mg tablet, Take 1 tablet (1 mg total) by mouth daily, Disp: 30 tablet, Rfl: 5    hydroxychloroquine (PLAQUENIL) 200 mg tablet, Take 1 tablet (200 mg total) by mouth 2 (two) times a day, Disp: 60 tablet, Rfl: 3    magnesium gluconate (MAGONATE) 500 mg tablet, Take 500 mg by mouth daily, Disp: , Rfl:     multivitamin (THERAGRAN) TABS, Take 1 tablet by mouth daily, Disp: , Rfl:     Omega-3 Fatty Acids (Fish Oil Concentrate) 300 MG CAPS, Take 1 capsule by mouth, Disp: , Rfl:     oxyCODONE (ROXICODONE) 5 mg immediate release tablet, Take 1 tablet (5 mg total) by mouth every 6 (six) hours as needed for moderate pain or severe painMax Daily Amount: 20 mg (Patient not taking: Reported on 4/19/2021), Disp: 20 tablet, Rfl: 0    pyridoxine (B-6) 100 MG tablet, Take 100 mg by mouth, Disp: , Rfl:     TRAMADOL HCL PO, Take by mouth 50mg as needed, Disp: , Rfl:     vitamin B-12 (VITAMIN B-12) 1,000 mcg tablet, Take by mouth daily, Disp: , Rfl:     zinc gluconate 50 mg tablet, Take 50 mg by mouth daily, Disp: , Rfl:     gabapentin (NEURONTIN) 100 mg capsule, Take 1 capsule (100 mg total) by mouth 3 (three) times a day, Disp: 90 capsule, Rfl: 0    methotrexate 2 5 mg tablet, 8 tablet po weekly (take all 8 tablets on the same day every week), Disp: 32 tablet, Rfl: 0    phentermine 15 MG capsule, Take 1 capsule (15 mg total) by mouth every morning, Disp: 30 capsule, Rfl: 0    Objective:    Vitals:    04/14/21 1518   BP: 120/80   BP Location: Left arm   Patient Position: Sitting   Cuff Size: Large       Physical Exam  Constitutional:       General: She is not in acute distress  Appearance: She is well-developed  HENT:      Head: Normocephalic and atraumatic     Eyes: General: Lids are normal  No scleral icterus  Conjunctiva/sclera: Conjunctivae normal    Neck:      Musculoskeletal: Neck supple  No muscular tenderness  Cardiovascular:      Rate and Rhythm: Normal rate and regular rhythm  Heart sounds: S1 normal and S2 normal  No murmur  No friction rub  Pulmonary:      Effort: Pulmonary effort is normal  No tachypnea or respiratory distress  Breath sounds: Normal breath sounds  No wheezing, rhonchi or rales  Musculoskeletal:         General: Swelling and tenderness present  Comments: Left knee swelling and tenderness   Skin:     General: Skin is warm and dry  Findings: No rash  Nails: There is no clubbing  Neurological:      Mental Status: She is alert  Sensory: Sensory deficit present  Comments: L calf numbness   Psychiatric:         Behavior: Behavior normal  Behavior is cooperative  Reviewed labs and imaging  Imaging:   Bilateral Knee x-rays 4/14/21  Right: unremarkable  Left: Tricompartmental osteoarthritic degenerative changes of the left knee with severe medial compartment joint space narrowing  Bilateral Hand x-rays 8/20/20: unremarkable    Labs:   Office Visit on 04/14/2021   Component Date Value Ref Range Status    Sodium 04/14/2021 140  136 - 145 mmol/L Final    Potassium 04/14/2021 3 8  3 5 - 5 3 mmol/L Final    Chloride 04/14/2021 103  100 - 108 mmol/L Final    CO2 04/14/2021 28  21 - 32 mmol/L Final    ANION GAP 04/14/2021 9  4 - 13 mmol/L Final    BUN 04/14/2021 14  5 - 25 mg/dL Final    Creatinine 04/14/2021 0 66  0 60 - 1 30 mg/dL Final    Standardized to IDMS reference method    Glucose 04/14/2021 121  65 - 140 mg/dL Final    If the patient is fasting, the ADA then defines impaired fasting glucose as > 100 mg/dL and diabetes as > or equal to 123 mg/dL  Specimen collection should occur prior to Sulfasalazine administration due to the potential for falsely depressed results   Specimen collection should occur prior to Sulfapyridine administration due to the potential for falsely elevated results   Calcium 04/14/2021 9 0  8 3 - 10 1 mg/dL Final    AST 04/14/2021 14  5 - 45 U/L Final    Specimen collection should occur prior to Sulfasalazine administration due to the potential for falsely depressed results   ALT 04/14/2021 29  12 - 78 U/L Final    Specimen collection should occur prior to Sulfasalazine administration due to the potential for falsely depressed results   Alkaline Phosphatase 04/14/2021 73  46 - 116 U/L Final    Total Protein 04/14/2021 7 8  6 4 - 8 2 g/dL Final    Albumin 04/14/2021 3 6  3 5 - 5 0 g/dL Final    Total Bilirubin 04/14/2021 0 22  0 20 - 1 00 mg/dL Final    Use of this assay is not recommended for patients undergoing treatment with eltrombopag due to the potential for falsely elevated results   eGFR 04/14/2021 102  ml/min/1 73sq m Final    WBC 04/14/2021 8 20  4  31 - 10 16 Thousand/uL Final    RBC 04/14/2021 4 13  3 81 - 5 12 Million/uL Final    Hemoglobin 04/14/2021 12 2  11 5 - 15 4 g/dL Final    Hematocrit 04/14/2021 38 5  34 8 - 46 1 % Final    MCV 04/14/2021 93  82 - 98 fL Final    MCH 04/14/2021 29 5  26 8 - 34 3 pg Final    MCHC 04/14/2021 31 7  31 4 - 37 4 g/dL Final    RDW 04/14/2021 13 1  11 6 - 15 1 % Final    MPV 04/14/2021 9 2  8 9 - 12 7 fL Final    Platelets 04/92/4596 328  149 - 390 Thousands/uL Final    nRBC 04/14/2021 0  /100 WBCs Final    Neutrophils Relative 04/14/2021 62  43 - 75 % Final    Immat GRANS % 04/14/2021 0  0 - 2 % Final    Lymphocytes Relative 04/14/2021 26  14 - 44 % Final    Monocytes Relative 04/14/2021 9  4 - 12 % Final    Eosinophils Relative 04/14/2021 2  0 - 6 % Final    Basophils Relative 04/14/2021 1  0 - 1 % Final    Neutrophils Absolute 04/14/2021 5 07  1 85 - 7 62 Thousands/µL Final    Immature Grans Absolute 04/14/2021 0 02  0 00 - 0 20 Thousand/uL Final    Lymphocytes Absolute 04/14/2021 2  14  0 60 - 4 47 Thousands/µL Final    Monocytes Absolute 04/14/2021 0 73  0 17 - 1 22 Thousand/µL Final    Eosinophils Absolute 04/14/2021 0 16  0 00 - 0 61 Thousand/µL Final    Basophils Absolute 04/14/2021 0 08  0 00 - 0 10 Thousands/µL Final    CRP 04/14/2021 12 9* <3 0 mg/L Final    Sed Rate 04/14/2021 16  0 - 29 mm/hour Final   Orders Only on 03/08/2021   Component Date Value Ref Range Status    SARS-CoV-2 03/08/2021 Positive* Negative Final   Appointment on 02/10/2021   Component Date Value Ref Range Status    WBC 02/10/2021 6 21  4 31 - 10 16 Thousand/uL Final    RBC 02/10/2021 4 33  3 81 - 5 12 Million/uL Final    Hemoglobin 02/10/2021 13 4  11 5 - 15 4 g/dL Final    Hematocrit 02/10/2021 41 1  34 8 - 46 1 % Final    MCV 02/10/2021 95  82 - 98 fL Final    MCH 02/10/2021 30 9  26 8 - 34 3 pg Final    MCHC 02/10/2021 32 6  31 4 - 37 4 g/dL Final    RDW 02/10/2021 12 9  11 6 - 15 1 % Final    MPV 02/10/2021 9 5  8 9 - 12 7 fL Final    Platelets 35/27/7315 332  149 - 390 Thousands/uL Final    nRBC 02/10/2021 0  /100 WBCs Final    Neutrophils Relative 02/10/2021 63  43 - 75 % Final    Immat GRANS % 02/10/2021 0  0 - 2 % Final    Lymphocytes Relative 02/10/2021 26  14 - 44 % Final    Monocytes Relative 02/10/2021 7  4 - 12 % Final    Eosinophils Relative 02/10/2021 3  0 - 6 % Final    Basophils Relative 02/10/2021 1  0 - 1 % Final    Neutrophils Absolute 02/10/2021 3 91  1 85 - 7 62 Thousands/µL Final    Immature Grans Absolute 02/10/2021 0 01  0 00 - 0 20 Thousand/uL Final    Lymphocytes Absolute 02/10/2021 1 62  0 60 - 4 47 Thousands/µL Final    Monocytes Absolute 02/10/2021 0 46  0 17 - 1 22 Thousand/µL Final    Eosinophils Absolute 02/10/2021 0 16  0 00 - 0 61 Thousand/µL Final    Basophils Absolute 02/10/2021 0 05  0 00 - 0 10 Thousands/µL Final    Sodium 02/10/2021 141  136 - 145 mmol/L Final    Potassium 02/10/2021 5 4* 3 5 - 5 3 mmol/L Final    Chloride 02/10/2021 108  100 - 108 mmol/L Final    CO2 02/10/2021 27  21 - 32 mmol/L Final    ANION GAP 02/10/2021 6  4 - 13 mmol/L Final    BUN 02/10/2021 17  5 - 25 mg/dL Final    Creatinine 02/10/2021 0 73  0 60 - 1 30 mg/dL Final    Standardized to IDMS reference method    Glucose, Fasting 02/10/2021 87  65 - 99 mg/dL Final    Specimen collection should occur prior to Sulfasalazine administration due to the potential for falsely depressed results  Specimen collection should occur prior to Sulfapyridine administration due to the potential for falsely elevated results   Calcium 02/10/2021 9 0  8 3 - 10 1 mg/dL Final    AST 02/10/2021 23  5 - 45 U/L Final    Specimen collection should occur prior to Sulfasalazine administration due to the potential for falsely depressed results   ALT 02/10/2021 58  12 - 78 U/L Final    Specimen collection should occur prior to Sulfasalazine administration due to the potential for falsely depressed results   Alkaline Phosphatase 02/10/2021 58  46 - 116 U/L Final    Total Protein 02/10/2021 7 1  6 4 - 8 2 g/dL Final    Albumin 02/10/2021 3 7  3 5 - 5 0 g/dL Final    Total Bilirubin 02/10/2021 0 28  0 20 - 1 00 mg/dL Final    Use of this assay is not recommended for patients undergoing treatment with eltrombopag due to the potential for falsely elevated results   eGFR 02/10/2021 95  ml/min/1 73sq m Final    CRP 02/10/2021 4 6* <3 0 mg/L Final    Sed Rate 02/10/2021 13  0 - 29 mm/hour Final    Vit D, 25-Hydroxy 02/10/2021 23 9* 30 0 - 100 0 ng/mL Final   Orders Only on 10/22/2020   Component Date Value Ref Range Status    SARS-CoV-2  10/22/2020 Not Detected  Not Detected Final    Comment: This nucleic acid amplification test was developed and its performance  characteristics determined by [facility]  Nucleic acid  amplification tests include PCR and TMA  This test has not been FDA  cleared or approved   This test has been authorized by FDA under an  Emergency Use Authorization (EUA)  This test is only authorized for  the duration of time the declaration that circumstances exist  justifying the authorization of the emergency use of in vitro  diagnostic tests for detection of SARS-CoV-2 virus and/or diagnosis  of COVID-19 infection under section 564(b)(1) of the Act, 21 U  [place]   302QQY-5(F) (1), unless the authorization is terminated or revoked  sooner  When diagnostic testing is negative, the possibility of a false  negative result should be considered in the context of a patient's  recent exposures and the presence of clinical signs and symptoms  consistent with COVID-19  An individual without symptoms of COVID-19  and who is not shedding SARS-CoV-2 virus would                            expect to have a  negative (not detected) result in this assay

## 2021-04-19 ENCOUNTER — OFFICE VISIT (OUTPATIENT)
Dept: FAMILY MEDICINE CLINIC | Facility: CLINIC | Age: 51
End: 2021-04-19
Payer: COMMERCIAL

## 2021-04-19 VITALS
SYSTOLIC BLOOD PRESSURE: 128 MMHG | OXYGEN SATURATION: 97 % | DIASTOLIC BLOOD PRESSURE: 80 MMHG | BODY MASS INDEX: 34.65 KG/M2 | WEIGHT: 195.6 LBS | TEMPERATURE: 95.7 F | HEART RATE: 68 BPM

## 2021-04-19 DIAGNOSIS — Z86.16 HISTORY OF COVID-19: ICD-10-CM

## 2021-04-19 DIAGNOSIS — R63.5 WEIGHT GAIN: ICD-10-CM

## 2021-04-19 DIAGNOSIS — M54.42 ACUTE LEFT-SIDED LOW BACK PAIN WITH LEFT-SIDED SCIATICA: Primary | ICD-10-CM

## 2021-04-19 PROCEDURE — 99213 OFFICE O/P EST LOW 20 MIN: CPT | Performed by: FAMILY MEDICINE

## 2021-04-19 RX ORDER — PHENTERMINE HYDROCHLORIDE 15 MG/1
15 CAPSULE ORAL EVERY MORNING
Qty: 30 CAPSULE | Refills: 0 | Status: SHIPPED | OUTPATIENT
Start: 2021-04-19 | End: 2021-05-17 | Stop reason: SDUPTHER

## 2021-04-19 NOTE — PROGRESS NOTES
FAMILY PRACTICE OFFICE VISIT       NAME: Neomi Mortimer  AGE: 45 y o  SEX: female       : 1970        MRN: [id number]    DATE: 2021  TIME: 1:16 PM    Assessment and Plan   1  Acute left-sided low back pain with left-sided sciatica  Comments:  Kassy Nolasco is stable on exam   Given her persistent symptoms, and abnormalities on her neuro exam - will proceed with ordering lumbar spine MRI  2  History of COVID-19  Comments:  Pt appears to have physically recovered nicely  She is still c/o some "brain fog"  Offered PT referral to pt - she declines at this time, & will monitor  3  Weight gain  Comments:  Discussed with pt at length  She is to continue a lower car diet, & light exercise  Trial of Phentermine - disc potential R/SE of medication  Orders:  -     phentermine 15 MG capsule; Take 1 capsule (15 mg total) by mouth every morning         There are no Patient Instructions on file for this visit  Chief Complaint     Chief Complaint   Patient presents with    Follow-up       History of Present Illness   Neomi Mortimer is a 45y o -year-old female who presents in f/u today  Kassy Nolasco cleared self-isolation for COV-19 on 3/29/21  She has had ongoing issues with LLB pain and sciatica  As of her last TeleVideo f/u visit on 3/29, pt's back pain was much better, but she continued to have numbness in the left foot  She continues f/u with Rheumatology for hx of RA  No fecal / urinary incontinence  Had a normal TSH in 2021  [place] PDMP was [place] today  Review of Systems   Review of Systems   Constitutional:        +Continued weight gain - trying to lose weight  Respiratory: Negative for shortness of breath  Cardiovascular: Negative for chest pain  Musculoskeletal: Negative for back pain  Neurological: Positive for numbness         Active Problem List     Patient Active Problem List   Diagnosis    Rheumatoid arthritis involving multiple sites with positive rheumatoid factor (HCC)    High risk medication use    Arthritis    Calcific tendinitis    Cervical pain    COVID-19         Past Medical History:  Past Medical History:   Diagnosis Date    Anxiety     Depression        Past Surgical History:  Past Surgical History:   Procedure Laterality Date    ROTATOR CUFF REPAIR         Family History:  History reviewed  No pertinent family history      Social History:  Social History     Socioeconomic History    Marital status: Single     Spouse name: Not on file    Number of children: Not on file    Years of education: Not on file    Highest education level: Not on file   Occupational History    Not on file   Social Needs    Financial resource strain: Not on file    Food insecurity     Worry: Not on file     Inability: Not on file    Transportation needs     Medical: Not on file     Non-medical: Not on file   Tobacco Use    Smoking status: Former Smoker    Smokeless tobacco: Current User    Tobacco comment: occasional vaping use   Substance and Sexual Activity    Alcohol use: Yes     Frequency: Monthly or less     Binge frequency: Less than monthly    Drug use: Never    Sexual activity: Not on file   Lifestyle    Physical activity     Days per week: Not on file     Minutes per session: Not on file    Stress: Not on file   Relationships    Social connections     Talks on phone: Not on file     Gets together: Not on file     Attends [employer] service: Not on file     Active member of club or organization: Not on file     Attends meetings of clubs or organizations: Not on file     Relationship status: Not on file    Intimate partner violence     Fear of current or ex partner: Not on file     Emotionally abused: Not on file     Physically abused: Not on file     Forced sexual activity: Not on file   Other Topics Concern    Not on file   Social History Narrative    Not on file       Objective     Vitals:    04/19/21 1223   BP: 128/80   Pulse: 68   Temp: (!) 95 7 °F (35 4 °C)   SpO2: 97% Wt Readings from Last 3 Encounters:   04/19/21 88 7 kg (195 lb 9 6 oz)   03/03/21 90 6 kg (199 lb 12 8 oz)   02/03/21 86 2 kg (190 lb)       Physical Exam  Vitals signs and nursing note reviewed  Constitutional:       General: She is not in acute distress  Appearance: Normal appearance  She is not ill-appearing, toxic-appearing or diaphoretic  HENT:      Head: Normocephalic and atraumatic  Eyes:      General: No scleral icterus  Conjunctiva/sclera: Conjunctivae normal    Neck:      Musculoskeletal: Normal range of motion and neck supple  No neck rigidity or muscular tenderness  Cardiovascular:      Rate and Rhythm: Normal rate and regular rhythm  Heart sounds: Normal heart sounds  No murmur  No friction rub  No gallop  Pulmonary:      Effort: Pulmonary effort is normal  No respiratory distress  Breath sounds: Normal breath sounds  No stridor  No wheezing, rhonchi or rales  Musculoskeletal:        Back:    Lymphadenopathy:      Cervical: No cervical adenopathy  Neurological:      Mental Status: She is alert and oriented to person, place, and time  Motor: Motor function is intact  No weakness  Deep Tendon Reflexes:      Reflex Scores:       Patellar reflexes are 2+ on the right side and 2+ on the left side  Achilles reflexes are 2+ on the right side and 1+ on the left side  Comments: +Decreased sensation to the lateral aspect of the left foot and digits 3 - 5  Psychiatric:         Mood and Affect: Mood normal          Behavior: Behavior normal          Thought Content:  Thought content normal          Judgment: Judgment normal          Pertinent Laboratory/Diagnostic Studies:  Lab Results   Component Value Date    BUN 14 04/14/2021    CREATININE 0 66 04/14/2021    CALCIUM 9 0 04/14/2021    K 3 8 04/14/2021    CO2 28 04/14/2021     04/14/2021     Lab Results   Component Value Date    ALT 29 04/14/2021    AST 14 04/14/2021    ALKPHOS 73 04/14/2021       Lab Results   Component Value Date    WBC 8 20 04/14/2021    HGB 12 2 04/14/2021    HCT 38 5 04/14/2021    MCV 93 04/14/2021     04/14/2021       No results found for: TSH    No results found for: CHOL  Lab Results   Component Value Date    TRIG 85 05/07/2020     Lab Results   Component Value Date    HDL 54 05/07/2020     Lab Results   Component Value Date    LDLCALC 114 (H) 05/07/2020     No results found for: HGBA1C    Results for orders placed or performed in visit on 04/14/21   Comprehensive metabolic panel   Result Value Ref Range    Sodium 140 136 - 145 mmol/L    Potassium 3 8 3 5 - 5 3 mmol/L    Chloride 103 100 - 108 mmol/L    CO2 28 21 - 32 mmol/L    ANION GAP 9 4 - 13 mmol/L    BUN 14 5 - 25 mg/dL    Creatinine 0 66 0 60 - 1 30 mg/dL    Glucose 121 65 - 140 mg/dL    Calcium 9 0 8 3 - 10 1 mg/dL    AST 14 5 - 45 U/L    ALT 29 12 - 78 U/L    Alkaline Phosphatase 73 46 - 116 U/L    Total Protein 7 8 6 4 - 8 2 g/dL    Albumin 3 6 3 5 - 5 0 g/dL    Total Bilirubin 0 22 0 20 - 1 00 mg/dL    eGFR 102 ml/min/1 73sq m   CBC and differential   Result Value Ref Range    WBC 8 20 4  31 - 10 16 Thousand/uL    RBC 4 13 3 81 - 5 12 Million/uL    Hemoglobin 12 2 11 5 - 15 4 g/dL    Hematocrit 38 5 34 8 - 46 1 %    MCV 93 82 - 98 fL    MCH 29 5 26 8 - 34 3 pg    MCHC 31 7 31 4 - 37 4 g/dL    RDW 13 1 11 6 - 15 1 %    MPV 9 2 8 9 - 12 7 fL    Platelets 529 608 - 279 Thousands/uL    nRBC 0 /100 WBCs    Neutrophils Relative 62 43 - 75 %    Immat GRANS % 0 0 - 2 %    Lymphocytes Relative 26 14 - 44 %    Monocytes Relative 9 4 - 12 %    Eosinophils Relative 2 0 - 6 %    Basophils Relative 1 0 - 1 %    Neutrophils Absolute 5 07 1 85 - 7 62 Thousands/µL    Immature Grans Absolute 0 02 0 00 - 0 20 Thousand/uL    Lymphocytes Absolute 2 14 0 60 - 4 47 Thousands/µL    Monocytes Absolute 0 73 0 17 - 1 22 Thousand/µL    Eosinophils Absolute 0 16 0 00 - 0 61 Thousand/µL    Basophils Absolute 0 08 0 00 - 0 10 Thousands/µL   C-reactive protein   Result Value Ref Range    CRP 12 9 (H) <3 0 mg/L   Sedimentation rate, automated   Result Value Ref Range    Sed Rate 16 0 - 29 mm/hour       No orders of the defined types were placed in this encounter        ALLERGIES:  Allergies   Allergen Reactions    Clarithromycin GI Intolerance       Current Medications     Current Outpatient Medications   Medication Sig Dispense Refill    diclofenac (VOLTAREN) 75 mg EC tablet Take 1 tablet (75 mg total) by mouth 2 (two) times a day Can take as needed for joint pain 60 tablet 3    folic acid (FOLVITE) 1 mg tablet Take 1 tablet (1 mg total) by mouth daily 30 tablet 5    gabapentin (NEURONTIN) 100 mg capsule Take 1 capsule (100 mg total) by mouth 3 (three) times a day 90 capsule 0    hydroxychloroquine (PLAQUENIL) 200 mg tablet Take 1 tablet (200 mg total) by mouth 2 (two) times a day 60 tablet 3    methotrexate 2 5 mg tablet 8 tablet po weekly (take all 8 tablets on the same day every week) 32 tablet 0    Acetaminophen (TYLENOL ARTHRITIS PAIN PO) Take by mouth      Ascorbic Acid (vitamin C) 1000 MG tablet Take 1,000 mg by mouth daily      cholecalciferol (VITAMIN D3) 1,000 units tablet Take 2,000 Units by mouth      cyclobenzaprine (FLEXERIL) 5 mg tablet Take 1 tablet (5 mg total) by mouth 3 (three) times a day as needed for muscle spasms (Patient not taking: Reported on 4/19/2021) 40 tablet 1    magnesium gluconate (MAGONATE) 500 mg tablet Take 500 mg by mouth daily      multivitamin (THERAGRAN) TABS Take 1 tablet by mouth daily      Omega-3 Fatty Acids (Fish Oil Concentrate) 300 MG CAPS Take 1 capsule by mouth      oxyCODONE (ROXICODONE) 5 mg immediate release tablet Take 1 tablet (5 mg total) by mouth every 6 (six) hours as needed for moderate pain or severe painMax Daily Amount: 20 mg (Patient not taking: Reported on 4/19/2021) 20 tablet 0    phentermine 15 MG capsule Take 1 capsule (15 mg total) by mouth every morning 30 capsule 0    pyridoxine (B-6) 100 MG tablet Take 100 mg by mouth      TRAMADOL HCL PO Take by mouth 50mg as needed      vitamin B-12 (VITAMIN B-12) 1,000 mcg tablet Take by mouth daily      zinc gluconate 50 mg tablet Take 50 mg by mouth daily       No current facility-administered medications for this visit  Health Maintenance     Health Maintenance   Topic Date Due    COVID-19 Vaccine (1) Never done    Annual Physical  03/11/2021    Cervical Cancer Screening  04/25/2021 (Originally 1/15/1990)    Influenza Vaccine (1) 06/30/2021 (Originally 9/1/2020)    Pneumococcal Vaccine: Pediatrics (0 to 5 Years) and At-Risk Patients (6 to 59 Years) (1 of 3 - PCV13) 03/03/2022 (Originally 1/15/1975)    DTaP,Tdap,and Td Vaccines (1 - Tdap) 03/03/2022 (Originally 1/15/1990)    Colorectal Cancer Screening  03/25/2022 (Originally 1/15/2019)    MAMMOGRAM  03/25/2022 (Originally 1970)    HIV Screening  04/20/2023 (Originally 1/15/1984)    Depression Screening PHQ  03/22/2022    BMI: Followup Plan  03/22/2022    BMI: Adult  04/19/2022    HIB Vaccine  Aged Out    Hepatitis B Vaccine  Aged Out    IPV Vaccine  Aged Out    Hepatitis A Vaccine  Aged Out    Meningococcal ACWY Vaccine  Aged Out    HPV Vaccine  Aged Out       There is no immunization history on file for this patient         Yazan Saavedra DO

## 2021-05-17 DIAGNOSIS — R63.5 WEIGHT GAIN: ICD-10-CM

## 2021-05-17 RX ORDER — PHENTERMINE HYDROCHLORIDE 15 MG/1
15 CAPSULE ORAL EVERY MORNING
Qty: 30 CAPSULE | Refills: 0 | Status: SHIPPED | OUTPATIENT
Start: 2021-05-17 | End: 2021-06-24 | Stop reason: DRUGHIGH

## 2021-05-25 ENCOUNTER — TELEPHONE (OUTPATIENT)
Dept: FAMILY MEDICINE CLINIC | Facility: CLINIC | Age: 51
End: 2021-05-25

## 2021-05-25 DIAGNOSIS — M54.50 PAIN OF LUMBAR SPINE: ICD-10-CM

## 2021-05-25 DIAGNOSIS — R20.8 NUMBNESS OF LEFT FOOT: ICD-10-CM

## 2021-05-25 DIAGNOSIS — R20.0 NUMBNESS IN LEFT LEG: Primary | ICD-10-CM

## 2021-05-25 NOTE — TELEPHONE ENCOUNTER
The patient has numbness in her left leg and toes  The patient would like to know if an MRI of the lumbar spine was approved  The MRI is in the patient's notes from 4/19/21 but was never ordered  Would you mind looking into this? Please advise  Thank you

## 2021-05-25 NOTE — TELEPHONE ENCOUNTER
Pt called back  Advised referral placed  Contact central scheduling to schedule  Out Auth team will begin the process for the prior auth     Gave pt phone # to schedule

## 2021-06-24 ENCOUNTER — HOSPITAL ENCOUNTER (OUTPATIENT)
Dept: MRI IMAGING | Facility: HOSPITAL | Age: 51
Discharge: HOME | End: 2021-06-24
Attending: FAMILY MEDICINE
Payer: COMMERCIAL

## 2021-06-24 ENCOUNTER — TELEPHONE (OUTPATIENT)
Dept: FAMILY MEDICINE CLINIC | Facility: CLINIC | Age: 51
End: 2021-06-24

## 2021-06-24 DIAGNOSIS — M54.50 PAIN OF LUMBAR SPINE: ICD-10-CM

## 2021-06-24 DIAGNOSIS — R20.0 NUMBNESS IN LEFT LEG: ICD-10-CM

## 2021-06-24 DIAGNOSIS — R20.8 NUMBNESS OF LEFT FOOT: ICD-10-CM

## 2021-06-24 PROCEDURE — G1004 CDSM NDSC: HCPCS

## 2021-06-24 PROCEDURE — A9585 GADOBUTROL INJECTION: HCPCS | Performed by: FAMILY MEDICINE

## 2021-06-24 PROCEDURE — 72158 MRI LUMBAR SPINE W/O & W/DYE: CPT

## 2021-06-24 RX ADMIN — GADOBUTROL 8 ML: 604.72 INJECTION INTRAVENOUS at 17:05

## 2021-06-24 NOTE — TELEPHONE ENCOUNTER
Pt is asking for an increased dosage and refill for phentermine as the weight loss has plateau'ed      Pt is asking for follow up with the clinical team

## 2021-06-29 ENCOUNTER — VBI (OUTPATIENT)
Dept: ADMINISTRATIVE | Facility: OTHER | Age: 51
End: 2021-06-29

## 2021-07-09 ENCOUNTER — OFFICE VISIT (OUTPATIENT)
Dept: FAMILY MEDICINE CLINIC | Facility: CLINIC | Age: 51
End: 2021-07-09
Payer: COMMERCIAL

## 2021-07-09 VITALS
BODY MASS INDEX: 31.11 KG/M2 | HEIGHT: 63 IN | SYSTOLIC BLOOD PRESSURE: 120 MMHG | TEMPERATURE: 98 F | HEART RATE: 85 BPM | WEIGHT: 175.6 LBS | DIASTOLIC BLOOD PRESSURE: 82 MMHG | RESPIRATION RATE: 14 BRPM | OXYGEN SATURATION: 98 %

## 2021-07-09 DIAGNOSIS — M54.16 LUMBAR RADICULOPATHY: Primary | ICD-10-CM

## 2021-07-09 DIAGNOSIS — R63.5 WEIGHT GAIN: ICD-10-CM

## 2021-07-09 PROCEDURE — 1036F TOBACCO NON-USER: CPT | Performed by: FAMILY MEDICINE

## 2021-07-09 PROCEDURE — 99213 OFFICE O/P EST LOW 20 MIN: CPT | Performed by: FAMILY MEDICINE

## 2021-07-09 NOTE — PROGRESS NOTES
FAMILY PRACTICE OFFICE VISIT       NAME: Flynn Somers  AGE: 45 y o  SEX: female       : 1970        MRN: [id number]    DATE: 2021  TIME: 9:40 PM    Assessment and Plan   1  Lumbar radiculopathy  Comments:  Radha Jhaveri is stable on exam   We have already referred Alison to Pain Management  2  Weight gain  Comments:  Continuing Phentermine - Pt considering dropping back down to the original dosing  Pt to continue a lower carb diet, and to get regular, lower impact exercise  Patient Instructions   Patient is due for physical          Chief Complaint     Chief Complaint   Patient presents with    Follow-up     patient is being seen for 2 month follow up        History of Present Illness   Flynn Somers is a 45y o -year-old female who presents in f/u  Had MRI done - impingement by HNP at L5-S1 of the left S1 nerve root was seen  No real back pain right now; still with numbness of the left foot  Overall tolerating Phentermine well - did have weight loss with initial dosing; not much of a change since dose increased  [place] PDMP checked  Review of Systems   Review of Systems   Constitutional: Negative for activity change and unexpected weight change  Weight has come down some with Phentermine - tolerating well so far; had actually done better at the 15mg QD dosing  Respiratory: Negative for shortness of breath  Cardiovascular: Negative for chest pain  Musculoskeletal:        Some stiffness of the back; no real pain now  Neurological: Positive for numbness  Continued numbness of the left foot  Psychiatric/Behavioral: Negative for sleep disturbance         Active Problem List     Patient Active Problem List   Diagnosis    Rheumatoid arthritis involving multiple sites with positive rheumatoid factor (HCC)    High risk medication use    Arthritis    Calcific tendinitis    Cervical pain    COVID-19         Past Medical History:  Past Medical History:   Diagnosis Date    Anxiety     Depression        Past Surgical History:  Past Surgical History:   Procedure Laterality Date    ROTATOR CUFF REPAIR         Family History:  History reviewed  No pertinent family history  Social History:  Social History     Socioeconomic History    Marital status: Single     Spouse name: Not on file    Number of children: Not on file    Years of education: Not on file    Highest education level: Not on file   Occupational History    Not on file   Tobacco Use    Smoking status: Former Smoker    Smokeless tobacco: Current User    Tobacco comment: occasional vaping use   Vaping Use    Vaping Use: Every day   Substance and Sexual Activity    Alcohol use: Yes     Comment: occassionally    Drug use: Never    Sexual activity: Not on file   Other Topics Concern    Not on file   Social History Narrative    Not on file     Social Determinants of Health     Financial Resource Strain:     Difficulty of Paying Living Expenses:    Food Insecurity:     Worried About Running Out of Food in the Last Year:     [address] in the Last Year:    Transportation Needs:     Lack of Transportation (Medical):      Lack of Transportation (Non-Medical):    Physical Activity:     Days of Exercise per Week:     Minutes of Exercise per Session:    Stress:     Feeling of Stress :    Social Connections:     Frequency of Communication with Friends and Family:     Frequency of Social Gatherings with Friends and Family:     Attends [employer] Services:     Active Member of Clubs or Organizations:     Attends Club or Organization Meetings:     Marital Status:    Intimate Partner Violence:     Fear of Current or Ex-Partner:     Emotionally Abused:     Physically Abused:     Sexually Abused:        Objective     Vitals:    07/09/21 1413   BP: 120/82   Pulse: 85   Resp: 14   Temp: 98 °F (36 7 °C)   SpO2: 98%     Wt Readings from Last 3 Encounters:   07/09/21 79 7 kg (175 lb 9 6 oz)   04/19/21 88 7 kg (195 lb 9 6 oz)   03/03/21 90 6 kg (199 lb 12 8 oz)       Physical Exam  Vitals and nursing note reviewed  Constitutional:       General: She is not in acute distress  Appearance: Normal appearance  She is not ill-appearing, toxic-appearing or diaphoretic  HENT:      Head: Normocephalic and atraumatic  Eyes:      General: No scleral icterus  Conjunctiva/sclera: Conjunctivae normal    Cardiovascular:      Rate and Rhythm: Normal rate and regular rhythm  Heart sounds: Normal heart sounds  No murmur heard  No friction rub  No gallop  Pulmonary:      Effort: Pulmonary effort is normal  No respiratory distress  Breath sounds: Normal breath sounds  No stridor  No wheezing, rhonchi or rales  Musculoskeletal:      Cervical back: Normal range of motion and neck supple  No rigidity or tenderness  Lymphadenopathy:      Cervical: No cervical adenopathy  Neurological:      Mental Status: She is alert and oriented to person, place, and time  Psychiatric:         Mood and Affect: Mood normal          Behavior: Behavior normal          Thought Content:  Thought content normal          Judgment: Judgment normal          Pertinent Laboratory/Diagnostic Studies:  Lab Results   Component Value Date    BUN 14 04/14/2021    CREATININE 0 66 04/14/2021    CALCIUM 9 0 04/14/2021    K 3 8 04/14/2021    CO2 28 04/14/2021     04/14/2021     Lab Results   Component Value Date    ALT 29 04/14/2021    AST 14 04/14/2021    ALKPHOS 73 04/14/2021       Lab Results   Component Value Date    WBC 8 20 04/14/2021    HGB 12 2 04/14/2021    HCT 38 5 04/14/2021    MCV 93 04/14/2021     04/14/2021       No results found for: TSH    No results found for: CHOL  Lab Results   Component Value Date    TRIG 85 05/07/2020     Lab Results   Component Value Date    HDL 54 05/07/2020     Lab Results   Component Value Date    LDLCALC 114 (H) 05/07/2020     No results found for: HGBA1C    Results for orders placed or performed in visit on 04/14/21   Comprehensive metabolic panel   Result Value Ref Range    Sodium 140 136 - 145 mmol/L    Potassium 3 8 3 5 - 5 3 mmol/L    Chloride 103 100 - 108 mmol/L    CO2 28 21 - 32 mmol/L    ANION GAP 9 4 - 13 mmol/L    BUN 14 5 - 25 mg/dL    Creatinine 0 66 0 60 - 1 30 mg/dL    Glucose 121 65 - 140 mg/dL    Calcium 9 0 8 3 - 10 1 mg/dL    AST 14 5 - 45 U/L    ALT 29 12 - 78 U/L    Alkaline Phosphatase 73 46 - 116 U/L    Total Protein 7 8 6 4 - 8 2 g/dL    Albumin 3 6 3 5 - 5 0 g/dL    Total Bilirubin 0 22 0 20 - 1 00 mg/dL    eGFR 102 ml/min/1 73sq m   CBC and differential   Result Value Ref Range    WBC 8 20 4  31 - 10 16 Thousand/uL    RBC 4 13 3 81 - 5 12 Million/uL    Hemoglobin 12 2 11 5 - 15 4 g/dL    Hematocrit 38 5 34 8 - 46 1 %    MCV 93 82 - 98 fL    MCH 29 5 26 8 - 34 3 pg    MCHC 31 7 31 4 - 37 4 g/dL    RDW 13 1 11 6 - 15 1 %    MPV 9 2 8 9 - 12 7 fL    Platelets 849 052 - 239 Thousands/uL    nRBC 0 /100 WBCs    Neutrophils Relative 62 43 - 75 %    Immat GRANS % 0 0 - 2 %    Lymphocytes Relative 26 14 - 44 %    Monocytes Relative 9 4 - 12 %    Eosinophils Relative 2 0 - 6 %    Basophils Relative 1 0 - 1 %    Neutrophils Absolute 5 07 1 85 - 7 62 Thousands/µL    Immature Grans Absolute 0 02 0 00 - 0 20 Thousand/uL    Lymphocytes Absolute 2 14 0 60 - 4 47 Thousands/µL    Monocytes Absolute 0 73 0 17 - 1 22 Thousand/µL    Eosinophils Absolute 0 16 0 00 - 0 61 Thousand/µL    Basophils Absolute 0 08 0 00 - 0 10 Thousands/µL   C-reactive protein   Result Value Ref Range    CRP 12 9 (H) <3 0 mg/L   Sedimentation rate, automated   Result Value Ref Range    Sed Rate 16 0 - 29 mm/hour       No orders of the defined types were placed in this encounter        ALLERGIES:  Allergies   Allergen Reactions    Clarithromycin GI Intolerance       Current Medications     Current Outpatient Medications   Medication Sig Dispense Refill    Acetaminophen (TYLENOL ARTHRITIS PAIN PO) Take by mouth      Ascorbic Acid (vitamin C) 1000 MG tablet Take 1,000 mg by mouth daily      cholecalciferol (VITAMIN D3) 1,000 units tablet Take 2,000 Units by mouth      diclofenac (VOLTAREN) 75 mg EC tablet Take 1 tablet (75 mg total) by mouth 2 (two) times a day Can take as needed for joint pain 60 tablet 3    folic acid (FOLVITE) 1 mg tablet Take 1 tablet (1 mg total) by mouth daily 30 tablet 5    hydroxychloroquine (PLAQUENIL) 200 mg tablet Take 1 tablet (200 mg total) by mouth 2 (two) times a day 60 tablet 3    magnesium gluconate (MAGONATE) 500 mg tablet Take 500 mg by mouth daily      methotrexate 2 5 mg tablet 8 tablet po weekly (take all 8 tablets on the same day every week) 32 tablet 0    multivitamin (THERAGRAN) TABS Take 1 tablet by mouth daily      phentermine 30 MG capsule Take 1 capsule (30 mg total) by mouth every morning 30 capsule 0    pyridoxine (B-6) 100 MG tablet Take 100 mg by mouth      vitamin B-12 (VITAMIN B-12) 1,000 mcg tablet Take by mouth daily      zinc gluconate 50 mg tablet Take 50 mg by mouth daily      cyclobenzaprine (FLEXERIL) 5 mg tablet Take 1 tablet (5 mg total) by mouth 3 (three) times a day as needed for muscle spasms (Patient not taking: Reported on 4/19/2021) 40 tablet 1    gabapentin (NEURONTIN) 100 mg capsule Take 1 capsule (100 mg total) by mouth 3 (three) times a day (Patient not taking: Reported on 7/9/2021) 90 capsule 0    Omega-3 Fatty Acids (Fish Oil Concentrate) 300 MG CAPS Take 1 capsule by mouth (Patient not taking: Reported on 7/9/2021)      oxyCODONE (ROXICODONE) 5 mg immediate release tablet Take 1 tablet (5 mg total) by mouth every 6 (six) hours as needed for moderate pain or severe painMax Daily Amount: 20 mg (Patient not taking: Reported on 4/19/2021) 20 tablet 0    TRAMADOL HCL PO Take by mouth 50mg as needed (Patient not taking: Reported on 7/9/2021)       No current facility-administered medications for this visit           [facility] Maintenance   Topic Date Due    COVID-19 Vaccine (1) Never done    Cervical Cancer Screening  Never done    Annual Physical  03/11/2021    Influenza Vaccine (1) 09/01/2021    Pneumococcal Vaccine: Pediatrics (0 to 5 Years) and At-Risk Patients (6 to 59 Years) (1 of 4 - PCV13) 03/03/2022 (Originally 1/15/1975)    DTaP,Tdap,and Td Vaccines (1 - Tdap) 03/03/2022 (Originally 1/15/1990)    Colorectal Cancer Screening  03/25/2022 (Originally 1/15/2019)    MAMMOGRAM  03/25/2022 (Originally 1970)    HIV Screening  04/20/2023 (Originally 1/15/1984)    Depression Screening PHQ  03/22/2022    BMI: Followup Plan  06/24/2022    BMI: Adult  07/09/2022    Hepatitis C Screening  Completed    HIB Vaccine  Aged Out    Hepatitis B Vaccine  Aged Out    IPV Vaccine  Aged Out    Hepatitis A Vaccine  Aged Out    Meningococcal ACWY Vaccine  Aged Out    HPV Vaccine  Aged Out       There is no immunization history on file for this patient         Anna Paredes DO

## 2021-07-14 ENCOUNTER — APPOINTMENT (OUTPATIENT)
Dept: LAB | Facility: CLINIC | Age: 51
End: 2021-07-14
Payer: COMMERCIAL

## 2021-07-14 ENCOUNTER — OFFICE VISIT (OUTPATIENT)
Dept: RHEUMATOLOGY | Facility: CLINIC | Age: 51
End: 2021-07-14
Payer: COMMERCIAL

## 2021-07-14 VITALS
BODY MASS INDEX: 31.01 KG/M2 | DIASTOLIC BLOOD PRESSURE: 78 MMHG | WEIGHT: 175 LBS | HEIGHT: 63 IN | SYSTOLIC BLOOD PRESSURE: 116 MMHG

## 2021-07-14 DIAGNOSIS — M17.12 OSTEOARTHRITIS OF LEFT KNEE, UNSPECIFIED OSTEOARTHRITIS TYPE: ICD-10-CM

## 2021-07-14 DIAGNOSIS — Z79.899 HIGH RISK MEDICATION USE: ICD-10-CM

## 2021-07-14 DIAGNOSIS — M05.79 RHEUMATOID ARTHRITIS INVOLVING MULTIPLE SITES WITH POSITIVE RHEUMATOID FACTOR (HCC): Primary | ICD-10-CM

## 2021-07-14 DIAGNOSIS — M25.50 ARTHRALGIA, UNSPECIFIED JOINT: ICD-10-CM

## 2021-07-14 DIAGNOSIS — G62.9 NEUROPATHY: ICD-10-CM

## 2021-07-14 LAB
ALBUMIN SERPL BCP-MCNC: 3.7 G/DL (ref 3.5–5)
ALP SERPL-CCNC: 65 U/L (ref 46–116)
ALT SERPL W P-5'-P-CCNC: 28 U/L (ref 12–78)
ANION GAP SERPL CALCULATED.3IONS-SCNC: 7 MMOL/L (ref 4–13)
AST SERPL W P-5'-P-CCNC: 18 U/L (ref 5–45)
BASOPHILS # BLD AUTO: 0.06 THOUSANDS/ΜL (ref 0–0.1)
BASOPHILS NFR BLD AUTO: 1 % (ref 0–1)
BILIRUB SERPL-MCNC: 0.15 MG/DL (ref 0.2–1)
BUN SERPL-MCNC: 21 MG/DL (ref 5–25)
CALCIUM SERPL-MCNC: 8.9 MG/DL (ref 8.3–10.1)
CHLORIDE SERPL-SCNC: 106 MMOL/L (ref 100–108)
CO2 SERPL-SCNC: 28 MMOL/L (ref 21–32)
CREAT SERPL-MCNC: 0.85 MG/DL (ref 0.6–1.3)
CRP SERPL QL: 3.3 MG/L
EOSINOPHIL # BLD AUTO: 0.18 THOUSAND/ΜL (ref 0–0.61)
EOSINOPHIL NFR BLD AUTO: 2 % (ref 0–6)
ERYTHROCYTE [DISTWIDTH] IN BLOOD BY AUTOMATED COUNT: 13.9 % (ref 11.6–15.1)
ERYTHROCYTE [SEDIMENTATION RATE] IN BLOOD: 8 MM/HOUR (ref 0–29)
GFR SERPL CREATININE-BSD FRML MDRD: 79 ML/MIN/1.73SQ M
GLUCOSE SERPL-MCNC: 94 MG/DL (ref 65–140)
HCT VFR BLD AUTO: 34.6 % (ref 34.8–46.1)
HGB BLD-MCNC: 11.4 G/DL (ref 11.5–15.4)
IMM GRANULOCYTES # BLD AUTO: 0.02 THOUSAND/UL (ref 0–0.2)
IMM GRANULOCYTES NFR BLD AUTO: 0 % (ref 0–2)
LYMPHOCYTES # BLD AUTO: 2.29 THOUSANDS/ΜL (ref 0.6–4.47)
LYMPHOCYTES NFR BLD AUTO: 31 % (ref 14–44)
MCH RBC QN AUTO: 30.7 PG (ref 26.8–34.3)
MCHC RBC AUTO-ENTMCNC: 32.9 G/DL (ref 31.4–37.4)
MCV RBC AUTO: 93 FL (ref 82–98)
MONOCYTES # BLD AUTO: 0.6 THOUSAND/ΜL (ref 0.17–1.22)
MONOCYTES NFR BLD AUTO: 8 % (ref 4–12)
NEUTROPHILS # BLD AUTO: 4.2 THOUSANDS/ΜL (ref 1.85–7.62)
NEUTS SEG NFR BLD AUTO: 58 % (ref 43–75)
NRBC BLD AUTO-RTO: 0 /100 WBCS
PLATELET # BLD AUTO: 320 THOUSANDS/UL (ref 149–390)
PMV BLD AUTO: 9.5 FL (ref 8.9–12.7)
POTASSIUM SERPL-SCNC: 4.4 MMOL/L (ref 3.5–5.3)
PROT SERPL-MCNC: 7.5 G/DL (ref 6.4–8.2)
RBC # BLD AUTO: 3.71 MILLION/UL (ref 3.81–5.12)
SODIUM SERPL-SCNC: 141 MMOL/L (ref 136–145)
WBC # BLD AUTO: 7.35 THOUSAND/UL (ref 4.31–10.16)

## 2021-07-14 PROCEDURE — 99215 OFFICE O/P EST HI 40 MIN: CPT | Performed by: INTERNAL MEDICINE

## 2021-07-14 PROCEDURE — 86140 C-REACTIVE PROTEIN: CPT | Performed by: INTERNAL MEDICINE

## 2021-07-14 PROCEDURE — 80053 COMPREHEN METABOLIC PANEL: CPT | Performed by: INTERNAL MEDICINE

## 2021-07-14 PROCEDURE — 3008F BODY MASS INDEX DOCD: CPT | Performed by: FAMILY MEDICINE

## 2021-07-14 PROCEDURE — 36415 COLL VENOUS BLD VENIPUNCTURE: CPT | Performed by: INTERNAL MEDICINE

## 2021-07-14 PROCEDURE — 85652 RBC SED RATE AUTOMATED: CPT | Performed by: INTERNAL MEDICINE

## 2021-07-14 PROCEDURE — 85025 COMPLETE CBC W/AUTO DIFF WBC: CPT | Performed by: INTERNAL MEDICINE

## 2021-07-14 PROCEDURE — 20610 DRAIN/INJ JOINT/BURSA W/O US: CPT | Performed by: INTERNAL MEDICINE

## 2021-07-14 RX ORDER — FOLIC ACID 1 MG/1
1 TABLET ORAL DAILY
Qty: 90 TABLET | Refills: 1 | Status: SHIPPED | OUTPATIENT
Start: 2021-07-14 | End: 2021-10-13 | Stop reason: SDUPTHER

## 2021-07-14 RX ORDER — BUPIVACAINE HYDROCHLORIDE 2.5 MG/ML
1 INJECTION, SOLUTION EPIDURAL; INFILTRATION; INTRACAUDAL ONCE
Status: COMPLETED | OUTPATIENT
Start: 2021-07-14 | End: 2021-07-14

## 2021-07-14 RX ORDER — GABAPENTIN 300 MG/1
300 CAPSULE ORAL 3 TIMES DAILY
Qty: 90 CAPSULE | Refills: 3 | Status: SHIPPED | OUTPATIENT
Start: 2021-07-14 | End: 2021-12-13 | Stop reason: ALTCHOICE

## 2021-07-14 RX ORDER — TRIAMCINOLONE ACETONIDE 40 MG/ML
40 INJECTION, SUSPENSION INTRA-ARTICULAR; INTRAMUSCULAR ONCE
Status: COMPLETED | OUTPATIENT
Start: 2021-07-14 | End: 2021-07-14

## 2021-07-14 RX ORDER — DICLOFENAC SODIUM 75 MG/1
75 TABLET, DELAYED RELEASE ORAL 2 TIMES DAILY
Qty: 180 TABLET | Refills: 1 | Status: SHIPPED | OUTPATIENT
Start: 2021-07-14 | End: 2021-10-13 | Stop reason: SDUPTHER

## 2021-07-14 RX ORDER — HYDROXYCHLOROQUINE SULFATE 200 MG/1
200 TABLET, FILM COATED ORAL 2 TIMES DAILY
Qty: 180 TABLET | Refills: 1 | Status: SHIPPED | OUTPATIENT
Start: 2021-07-14 | End: 2021-10-13 | Stop reason: ALTCHOICE

## 2021-07-14 RX ORDER — LIDOCAINE HYDROCHLORIDE 10 MG/ML
1 INJECTION, SOLUTION INFILTRATION; PERINEURAL ONCE
Status: DISCONTINUED | OUTPATIENT
Start: 2021-07-14 | End: 2021-07-14

## 2021-07-14 RX ADMIN — BUPIVACAINE HYDROCHLORIDE 1 ML: 2.5 INJECTION, SOLUTION EPIDURAL; INFILTRATION; INTRACAUDAL at 16:36

## 2021-07-14 RX ADMIN — TRIAMCINOLONE ACETONIDE 40 MG: 40 INJECTION, SUSPENSION INTRA-ARTICULAR; INTRAMUSCULAR at 15:29

## 2021-07-14 NOTE — PATIENT INSTRUCTIONS
Do labs  Continue methotrexate 8 tabs once a week  Continue folic acid daily  Continue hydroxychloroquine twice a day  Left knee steroid injection given in clinic  Can take gabapentin up to three times a day as needed for left leg numbness  Continue diclofenac twice a day as needed, and can use gel as needed for joint pain    Return to clinic in 3 months    Rheumatoid Arthritis   AMBULATORY CARE:   Rheumatoid arthritis  is a long-term autoimmune disease that causes inflammation and damage to your joints  RA causes your body's immune system to attack the synovial membrane (lining) in your joints  RA can also affect other organs, such as your eyes, heart, or lungs  It may also increase your risk of osteoporosis (weakened bones)  Common symptoms include the following:   · Joint pain and stiffness that lasts longer than 1 hour    · Swollen joints in the same joint on both sides of your body    · Loss of joint movement    · Firm, round nodules (growths) on your joints    · Fatigue or muscle weakness    · Loss of appetite or weight loss    Call your doctor or rheumatologist if:   · You have a fever  · You have increased joint swelling, pain, or redness  · Your skin is itchy, swollen, or has a rash  · Your symptoms are getting worse, even with treatment  · You have questions or concerns about your condition or care  Treatment:  The goal of treatment within the first year is remission (no pain or inflammation)  If full remission cannot be reached, the goal is as few arthritis flares as possible  Early treatment can also help prevent or slow joint damage  Treatment may change after the first year, depending on how your body responds  · Antirheumatics  help slow the progress of RA, and reduce pain, stiffness, and inflammation  · NSAIDs , such as ibuprofen, help decrease swelling, pain, and fever  This medicine is available with or without a doctor's order   NSAIDs can cause stomach bleeding or kidney problems in certain people  If you take blood thinner medicine, always ask your healthcare provider if NSAIDs are safe for you  Always read the medicine label and follow directions  · Steroids  help decrease inflammation  · Biologic therapy  helps decrease joint swelling, pain, and stiffness  These medicines increase the risk of serious infection and need careful monitoring  · Surgery  may be needed to remove all or part of your joint  An implant may be placed to help reduce pain and repair the joint  Manage your symptoms:   · Rest when needed  Rest is important if your joints are painful  Limit your activities until your symptoms improve  Gradually start your normal activities when you can do them without pain  Avoid motions and activities that cause strain on your joints, such as heavy exercise and lifting  · Use ice or heat  Both can help decrease swelling and pain  Ice may also help prevent tissue damage  Use an ice pack, or put crushed ice in a plastic bag  Cover it with a towel and place it on your joint for 15 to 20 minutes every hour or as directed  You can apply heat for 20 minutes every 2 hours  Heat treatment includes hot packs, heat lamps, warm baths, or showers  · Elevate your joint  Elevation helps reduce swelling and pain  Raise your joint above the level of your heart as often as you can  Prop your painful joint on pillows to keep it above your heart comfortably  Manage RA:   · Talk to your healthcare providers about your arthritis medicines  Some medicines may only be needed when you have arthritis pain  You may need to take other medicines every day to prevent arthritis from getting worse  Your healthcare providers will help you understand all your medicines and when to take them  It is important to take the medicines as directed, even if you start to feel better  You can continue to have joint damage and inflammation even if you do not feel it      · Eat a variety of healthy foods  Healthy foods include fruits, vegetables, whole-grain breads, low-fat dairy products, beans, lean meats, and fish  Ask if you need to be on a special diet  A diet rich in calcium and vitamin D may decrease your risk of osteoporosis  Foods high in calcium include milk, cheese, broccoli, and tofu  Vitamin D may be found in meat, fish, fortified milk, cereal and bread  Ask if you need calcium or vitamin D supplements  · Maintain a healthy weight  This may help decrease strain on joints in your back, knees, ankles, and feet  Ask your healthcare provider how much you should weigh  Ask him or her to help you create a weight loss plan if you are overweight  Exercise can help you maintain a healthy weight  · Go to physical or occupational therapy as directed  Physical activity can help relieve pain and stiffness  A physical therapist can teach you exercises to improve flexibility and range of motion  You may also be shown non-weight-bearing exercises that are safe for your joints, such as swimming  An occupational therapist can help you learn to do your daily activities when your joints are stiff or sore  · Do not smoke  Nicotine and other chemicals in cigarettes and cigars can damage your bones and joints  Ask your healthcare provider for information if you currently smoke and need help to quit  E-cigarettes or smokeless tobacco still contain nicotine  Talk to your healthcare provider before you use these products  RA medicines and pregnancy:   · Men:  Talk to your doctor about your RA and the medicines you take  Some medicines may keep your partner from getting pregnant  Talk to your doctor if you and your partner are discussing pregnancy  · Women:  Talk to your gynecologist about contraceptives  Tell him or her about your RA and what medicines you take  He or she will tell you what the best contraceptive for will be   Not all contraceptives are effective if you have RA and are taking certain medicines  You may not be able to breastfeed if you are taking certain RA medicines  Support devices to help manage arthritis:   · Orthotic shoes or insoles  help support your feet when you walk  · Crutches, a cane, or a walker  may help decrease your risk for falling  They also decrease stress on affected joints  · Devices to prevent falls  include raised toilet seats and bathtub bars to help you get up from sitting  Handrails can be placed in areas where you need balance and support  · Devices to help with support and rest  include splints to wear on your hands and a firm pillow while you sleep  Use a pillow that is firm enough to support your neck and head  Ask your healthcare provider about vaccines:  RA or its treatment may increase your risk for infections  Vaccines can help protect you from infections caused by certain bacteria or viruses  Follow up with your healthcare provider as directed:  Write down your questions so you remember to ask them during your visits  © Copyright [address] Information is for End User's use only and may not be sold, redistributed or otherwise used for commercial purposes  All illustrations and images included in CareNotes® are the copyrighted property of [employer]  or [facility]   The above information is an  only  It is not intended as medical advice for individual conditions or treatments  Talk to your doctor, nurse or pharmacist before following any medical regimen to see if it is safe and effective for you

## 2021-07-14 NOTE — PROGRESS NOTES
Assessment and Plan:   45 y o  female presenting for seropositive RA follow up  Symptoms much improved after adding methotrexate, and performing L knee steroid injection at last visit, which was completed again today since joint pain and swelling symptoms just started returning a few days ago  Increased gabapentin to 300 mg po TID for left leg numbness; will be seeing pain management shortly to address her lumbosacral radiculopathy causing the left leg numbness  Can also use diclofenac gel as needed for painful joints  She is to continue hydroxychloroquine 200 mg p o  b i d , methotrexate 20 mg p o  weekly with folic acid 1 mg p o  daily, and diclofenac 75 mg p o  b i d  as needed for joint pain  Ordered routine monitoring labs; CRP has significantly improved  Plan:  Diagnoses and all orders for this visit:    Rheumatoid arthritis involving multiple sites with positive rheumatoid factor (HCC)  -     CBC and differential  -     Comprehensive metabolic panel  -     C-reactive protein  -     Sedimentation rate, automated  -     folic acid (FOLVITE) 1 mg tablet; Take 1 tablet (1 mg total) by mouth daily  -     hydroxychloroquine (PLAQUENIL) 200 mg tablet; Take 1 tablet (200 mg total) by mouth 2 (two) times a day  -     methotrexate 2 5 mg tablet; 8 tablet po weekly (take all 8 tablets on the same day every week)    Arthralgia, unspecified joint  -     Diclofenac Sodium (VOLTAREN) 1 %; Apply 2 g topically 4 (four) times a day  -     diclofenac (VOLTAREN) 75 mg EC tablet; Take 1 tablet (75 mg total) by mouth 2 (two) times a day Can take as needed for joint pain    Neuropathy  -     gabapentin (NEURONTIN) 300 mg capsule;  Take 1 capsule (300 mg total) by mouth 3 (three) times a day As needed for numbness    Osteoarthritis of left knee, unspecified osteoarthritis type  -     triamcinolone acetonide (KENALOG-40) 40 mg/mL injection 40 mg  -     Discontinue: lidocaine (XYLOCAINE) 1 % injection 1 mL  -     Large joint arthrocentesis: L knee  -     bupivacaine (PF) (MARCAINE) 0 25 % injection 1 mL    High risk medication use  -     CBC and differential  -     Comprehensive metabolic panel  -     folic acid (FOLVITE) 1 mg tablet; Take 1 tablet (1 mg total) by mouth daily    High risk medication use - Benefits and risks of hydroxychloroquine, including but not limited to retinal toxicity, corneal deposits, gastrointestinal side effects, and headaches were previously discussed with the patient  She is aware of the need for a regular eye exam to monitor for ocular toxicity while on this medication  Benefits and risks of methotrexate use, including but not limited to gastrointestinal disturbances such as diarrhea, hair loss, fatigue, stomatitis, leukopenia, lung hypersensitivity reaction, hepatotoxicity, and lymphoma were previously discussed with the patient  Baseline viral hepatitis panel was ordered and returned negative   CBC,CMP will be regularly monitored  Patient does not plan on having any children  Patient was prescribed Folic Acid 1 mg po daily to take while on methotrexate to help prevent side effects  Patient counseled on abstinence from alcohol while using methotrexate      Follow-up plan: RTC in 3 months        Rheumatic Disease Summary:  Katie De La Fuente a 45 y  o  female who originally presented on 5/13/20 via telemedicine to establish care for her newly diagnosed seropositive (RF+, anti-CCP+) Rheumatoid arthritis   Patient had migratory early RA symptoms, which significantly improved with prednisone   Asked her to continue her prednisone course prescribed by PCP    Initiated her on DMARD therapy with weight-based hydroxychloroquine 200mg po bid; asked patient to get regular eye exams while on this medication to monitor for Plaquenil-related retinal toxicity   Ophthalmology referral made per patient's request  Marilyn Solorio chose this more benign DMARD therapy over starting methotrexate during this time in the pandemic   Ordered hand x-rays for patient to do when she gets the chance to have her baseline   Asked patient to call or message clinic with any RA flare symptoms        She presented for follow-up on 08/19/2020  At that time, her RA had not come under control with three months of hydroxychloroquine alone, so added on methotrexate 15mg po weekly with daily folic acid  Bilateral hand x-rays returned unremarkable  Since naproxen and meloxicam had not helped patient's breakthrough joint pain in the past, prescribed diclofenac 75mg po bid prn instead  Kenalog 80mg IM steroid injection administered in clinic to treat her active RA symptoms, which had been affecting her ability to work; filled out [employer] paperwork in clinic (patient works as a prison cook)     She then presented on 11/24/2020 to Dr Holly Roa for follow-up  Was having flare-ups with her right knee, and was put on prednisone p r n  at the time  No other medication changes were made      2/3/21:  Patient's rheumatoid arthritis was not coming under control with continued methotrexate 15 mg p o  weekly and hydroxychloroquine 200 mg p o  b i d  She still needed to take prednisone 10mg p r n  for knee flare ups  Decided to increase patient's methotrexate to 20 mg PO weekly with daily folic acid  She was also to continue hydroxychloroquine 200 mg p o  b i d , and would like to continue diclofenac 75 mg p o  b i d  Renewed her prednisone 10 mg p o  daily as needed while awaiting for the higher dose of methotrexate to take effect  In 2 months, planned to discuss whether advance in therapy to triple therapy with addition of sulfasalazine, or addition of a biologic agent is needed at the time  Asked patient to bring up her eye floaters issue to her eye doctor; she had a normal baseline eye exam before starting hydroxychloroquine  4/14/21: Patient mainly complains of left knee pain and swelling lately, worse when she is on her legs   Knee x-rays ordered reveal severe OA of left knee  Left knee steroid injection given in clinic, which patient tolerated well  Routine monitoring labs ordered and completed  Will continue patient on same medications, since her main symptomatic joint is likely due to her severe OA in left knee rather than RA, which was addressed to today with steroid injection  Continue methotrexate 20mg po weekly with daily folic acid and hydroxychloroquine 200mg po bid; patient is aware to get regular eye exams  Emphasized to patient that she can take the diclofenac as needed instead of regularly  For her left calf numbness, prescribed gabapentin 100mg po tid prn  AC   Adelaida Lorenz is a 45 y o   female who presents for RA follow up  Last clinic visit was 4/14/21  Today she reports no joint problems except her L knee which has been great following the joint injection at her last visit but has started swelling again the last few days  She notes that she lost 20 lbs which has likely helped as well  She endorses morning stiffness in her legs and feet for at least 1 hr  She also notes that the numbness in her L leg from her lumbar DDD has not improved with the gabapentin 100 mg TID which was prescribed at the last visit, so she stopped taking it  Will be seeing pain management soon  She denies any adverse effects from methotrexate  The following portions of the patient's history were reviewed and updated as appropriate: allergies, current medications, past family history, past medical history, past social history, past surgical history and problem list     Review of Systems:   Review of Systems   Constitutional: Negative for fatigue and unexpected weight change  HENT: Negative for mouth sores  Respiratory: Negative for cough and shortness of breath  Gastrointestinal: Negative for constipation and diarrhea  Musculoskeletal: Positive for arthralgias (R knee), joint swelling (R knee) and neck pain  Negative for back pain and myalgias     Skin: Negative for color change and rash  Neurological: Positive for numbness (LLE)  Negative for weakness  Psychiatric/Behavioral: Negative for sleep disturbance  All other systems reviewed and are negative        Home Medications:     Current Outpatient Medications:     Acetaminophen (TYLENOL ARTHRITIS PAIN PO), Take by mouth, Disp: , Rfl:     Ascorbic Acid (vitamin C) 1000 MG tablet, Take 1,000 mg by mouth daily, Disp: , Rfl:     cholecalciferol (VITAMIN D3) 1,000 units tablet, Take 2,000 Units by mouth, Disp: , Rfl:     diclofenac (VOLTAREN) 75 mg EC tablet, Take 1 tablet (75 mg total) by mouth 2 (two) times a day Can take as needed for joint pain, Disp: 180 tablet, Rfl: 1    folic acid (FOLVITE) 1 mg tablet, Take 1 tablet (1 mg total) by mouth daily, Disp: 90 tablet, Rfl: 1    hydroxychloroquine (PLAQUENIL) 200 mg tablet, Take 1 tablet (200 mg total) by mouth 2 (two) times a day, Disp: 180 tablet, Rfl: 1    magnesium gluconate (MAGONATE) 500 mg tablet, Take 500 mg by mouth daily, Disp: , Rfl:     methotrexate 2 5 mg tablet, 8 tablet po weekly (take all 8 tablets on the same day every week), Disp: 96 tablet, Rfl: 1    multivitamin (THERAGRAN) TABS, Take 1 tablet by mouth daily, Disp: , Rfl:     phentermine 30 MG capsule, Take 1 capsule (30 mg total) by mouth every morning, Disp: 30 capsule, Rfl: 0    pyridoxine (B-6) 100 MG tablet, Take 100 mg by mouth, Disp: , Rfl:     vitamin B-12 (VITAMIN B-12) 1,000 mcg tablet, Take by mouth daily, Disp: , Rfl:     zinc gluconate 50 mg tablet, Take 50 mg by mouth daily, Disp: , Rfl:     cyclobenzaprine (FLEXERIL) 5 mg tablet, Take 1 tablet (5 mg total) by mouth 3 (three) times a day as needed for muscle spasms (Patient not taking: Reported on 4/19/2021), Disp: 40 tablet, Rfl: 1    Diclofenac Sodium (VOLTAREN) 1 %, Apply 2 g topically 4 (four) times a day, Disp: 300 g, Rfl: 3    gabapentin (NEURONTIN) 300 mg capsule, Take 1 capsule (300 mg total) by mouth 3 (three) times a day As needed for numbness, Disp: 90 capsule, Rfl: 3    Omega-3 Fatty Acids (Fish Oil Concentrate) 300 MG CAPS, Take 1 capsule by mouth (Patient not taking: Reported on 7/9/2021), Disp: , Rfl:     oxyCODONE (ROXICODONE) 5 mg immediate release tablet, Take 1 tablet (5 mg total) by mouth every 6 (six) hours as needed for moderate pain or severe painMax Daily Amount: 20 mg (Patient not taking: Reported on 4/19/2021), Disp: 20 tablet, Rfl: 0    TRAMADOL HCL PO, Take by mouth 50mg as needed (Patient not taking: Reported on 7/9/2021), Disp: , Rfl:     Objective:    Vitals:    07/14/21 1419   BP: 116/78   BP Location: Left arm   Patient Position: Sitting   Cuff Size: Standard   Weight: 79 4 kg (175 lb)   Height: 5' 2 5" (1 588 m)       Physical Exam  Vitals reviewed  Constitutional:       General: She is not in acute distress  Appearance: She is well-developed  She is not diaphoretic  HENT:      Head: Normocephalic and atraumatic  Eyes:      Conjunctiva/sclera: Conjunctivae normal    Cardiovascular:      Rate and Rhythm: Normal rate and regular rhythm  Heart sounds: Normal heart sounds  No murmur heard  No friction rub  No gallop  Pulmonary:      Effort: Pulmonary effort is normal  No respiratory distress  Breath sounds: Normal breath sounds  No stridor  No wheezing, rhonchi or rales  Abdominal:      General: There is no distension  Palpations: Abdomen is soft  Tenderness: There is no abdominal tenderness  There is no guarding  Musculoskeletal:         General: Swelling (L knee, mild) present  Normal range of motion  Cervical back: Neck supple  Skin:     General: Skin is warm and dry  Findings: No rash  Neurological:      Mental Status: She is alert and oriented to person, place, and time  Sensory: No sensory deficit        Comments: Distal left lower extremity decreased sensation   Psychiatric:         Mood and Affect: Mood normal  Behavior: Behavior normal        Reviewed labs and imaging  Imaging:   MRI lumbar spine w wo contrast    Result Date: 6/30/2021  Narrative: MRI LUMBAR SPINE WITH AND WITHOUT CONTRAST INDICATION: R20 0: Anesthesia of skin R20 8: Other disturbances of skin sensation M54 5: Low back pain  Left leg numbness  Low back pain  COMPARISON:  None  TECHNIQUE:  Sagittal T1, sagittal T2, sagittal inversion recovery, axial T1 and axial T2, coronal T2  Sagittal and axial T1 postcontrast  IV Contrast:  8 mL of Gadobutrol injection (SINGLE-DOSE) IMAGE QUALITY:  Diagnostic FINDINGS: VERTEBRAL BODIES:  There are 5 lumbar type vertebral bodies  Normal alignment of the lumbar spine  No spondylolysis or spondylolisthesis  No scoliosis  No compression fracture  Normal marrow signal is identified within the visualized bony structures  No discrete marrow lesion  SACRUM:  Normal signal within the sacrum  No evidence of insufficiency or stress fracture  DISTAL CORD AND CONUS:  Normal size and signal within the distal cord and conus  PARASPINAL SOFT TISSUES:  Paraspinal soft tissues are unremarkable  LOWER THORACIC DISC SPACES:  Normal disc height and signal   No disc herniation, canal stenosis or foraminal narrowing  LUMBAR DISC SPACES: L1-L2:  Normal  L2-L3:  Normal  L3-L4:  Mild facet hypertrophy  Minimal annular bulge  No significant central or foraminal narrowing  L4-L5:  Mild facet hypertrophy  No significant central or foraminal narrowing  L5-S1:  Central and left paramedian protrusion type disc herniation identified potentially impinging the left S1 nerve root in the lateral recess  Correlate for left S1 radiculopathy  Mild facet hypertrophy noted  Foramina are patent  POSTCONTRAST IMAGING:  No abnormal enhancement  Impression: Central and left paramedian protrusion type disc herniation at L5-S1 contacts and potentially impacts the left S1 nerve root  Correlate for left S1 radiculopathy   Mild degenerative change results were described   Workstation performed: [id number]     Bilateral Knee x-rays 4/14/21  Right: unremarkable  Left: Tricompartmental osteoarthritic degenerative changes of the left knee with severe medial compartment joint space narrowing      Bilateral Hand x-rays 8/20/20: unremarkable    Labs:   Office Visit on 07/14/2021   Component Date Value Ref Range Status    WBC 07/14/2021 7 35  4 31 - 10 16 Thousand/uL Final    RBC 07/14/2021 3 71* 3 81 - 5 12 Million/uL Final    Hemoglobin 07/14/2021 11 4* 11 5 - 15 4 g/dL Final    Hematocrit 07/14/2021 34 6* 34 8 - 46 1 % Final    MCV 07/14/2021 93  82 - 98 fL Final    MCH 07/14/2021 30 7  26 8 - 34 3 pg Final    MCHC 07/14/2021 32 9  31 4 - 37 4 g/dL Final    RDW 07/14/2021 13 9  11 6 - 15 1 % Final    MPV 07/14/2021 9 5  8 9 - 12 7 fL Final    Platelets 70/24/5906 320  149 - 390 Thousands/uL Final    nRBC 07/14/2021 0  /100 WBCs Final    Neutrophils Relative 07/14/2021 58  43 - 75 % Final    Immat GRANS % 07/14/2021 0  0 - 2 % Final    Lymphocytes Relative 07/14/2021 31  14 - 44 % Final    Monocytes Relative 07/14/2021 8  4 - 12 % Final    Eosinophils Relative 07/14/2021 2  0 - 6 % Final    Basophils Relative 07/14/2021 1  0 - 1 % Final    Neutrophils Absolute 07/14/2021 4 20  1 85 - 7 62 Thousands/µL Final    Immature Grans Absolute 07/14/2021 0 02  0 00 - 0 20 Thousand/uL Final    Lymphocytes Absolute 07/14/2021 2 29  0 60 - 4 47 Thousands/µL Final    Monocytes Absolute 07/14/2021 0 60  0 17 - 1 22 Thousand/µL Final    Eosinophils Absolute 07/14/2021 0 18  0 00 - 0 61 Thousand/µL Final    Basophils Absolute 07/14/2021 0 06  0 00 - 0 10 Thousands/µL Final    Sodium 07/14/2021 141  136 - 145 mmol/L Final    Potassium 07/14/2021 4 4  3 5 - 5 3 mmol/L Final    Chloride 07/14/2021 106  100 - 108 mmol/L Final    CO2 07/14/2021 28  21 - 32 mmol/L Final    ANION GAP 07/14/2021 7  4 - 13 mmol/L Final    BUN 07/14/2021 21  5 - 25 mg/dL Final    Creatinine 07/14/2021 0 85  0 60 - 1 30 mg/dL Final    Standardized to IDMS reference method    Glucose 07/14/2021 94  65 - 140 mg/dL Final    If the patient is fasting, the ADA then defines impaired fasting glucose as > 100 mg/dL and diabetes as > or equal to 123 mg/dL  Specimen collection should occur prior to Sulfasalazine administration due to the potential for falsely depressed results  Specimen collection should occur prior to Sulfapyridine administration due to the potential for falsely elevated results   Calcium 07/14/2021 8 9  8 3 - 10 1 mg/dL Final    AST 07/14/2021 18  5 - 45 U/L Final    Specimen collection should occur prior to Sulfasalazine administration due to the potential for falsely depressed results   ALT 07/14/2021 28  12 - 78 U/L Final    Specimen collection should occur prior to Sulfasalazine administration due to the potential for falsely depressed results   Alkaline Phosphatase 07/14/2021 65  46 - 116 U/L Final    Total Protein 07/14/2021 7 5  6 4 - 8 2 g/dL Final    Albumin 07/14/2021 3 7  3 5 - 5 0 g/dL Final    Total Bilirubin 07/14/2021 0 15* 0 20 - 1 00 mg/dL Final    Use of this assay is not recommended for patients undergoing treatment with eltrombopag due to the potential for falsely elevated results      eGFR 07/14/2021 79  ml/min/1 73sq m Final    CRP 07/14/2021 3 3* <3 0 mg/L Final    Sed Rate 07/14/2021 8  0 - 29 mm/hour Final   Office Visit on 04/14/2021   Component Date Value Ref Range Status    Sodium 04/14/2021 140  136 - 145 mmol/L Final    Potassium 04/14/2021 3 8  3 5 - 5 3 mmol/L Final    Chloride 04/14/2021 103  100 - 108 mmol/L Final    CO2 04/14/2021 28  21 - 32 mmol/L Final    ANION GAP 04/14/2021 9  4 - 13 mmol/L Final    BUN 04/14/2021 14  5 - 25 mg/dL Final    Creatinine 04/14/2021 0 66  0 60 - 1 30 mg/dL Final    Standardized to IDMS reference method    Glucose 04/14/2021 121  65 - 140 mg/dL Final    If the patient is fasting, the ADA then defines impaired fasting glucose as > 100 mg/dL and diabetes as > or equal to 123 mg/dL  Specimen collection should occur prior to Sulfasalazine administration due to the potential for falsely depressed results  Specimen collection should occur prior to Sulfapyridine administration due to the potential for falsely elevated results   Calcium 04/14/2021 9 0  8 3 - 10 1 mg/dL Final    AST 04/14/2021 14  5 - 45 U/L Final    Specimen collection should occur prior to Sulfasalazine administration due to the potential for falsely depressed results   ALT 04/14/2021 29  12 - 78 U/L Final    Specimen collection should occur prior to Sulfasalazine administration due to the potential for falsely depressed results   Alkaline Phosphatase 04/14/2021 73  46 - 116 U/L Final    Total Protein 04/14/2021 7 8  6 4 - 8 2 g/dL Final    Albumin 04/14/2021 3 6  3 5 - 5 0 g/dL Final    Total Bilirubin 04/14/2021 0 22  0 20 - 1 00 mg/dL Final    Use of this assay is not recommended for patients undergoing treatment with eltrombopag due to the potential for falsely elevated results   eGFR 04/14/2021 102  ml/min/1 73sq m Final    WBC 04/14/2021 8 20  4  31 - 10 16 Thousand/uL Final    RBC 04/14/2021 4 13  3 81 - 5 12 Million/uL Final    Hemoglobin 04/14/2021 12 2  11 5 - 15 4 g/dL Final    Hematocrit 04/14/2021 38 5  34 8 - 46 1 % Final    MCV 04/14/2021 93  82 - 98 fL Final    MCH 04/14/2021 29 5  26 8 - 34 3 pg Final    MCHC 04/14/2021 31 7  31 4 - 37 4 g/dL Final    RDW 04/14/2021 13 1  11 6 - 15 1 % Final    MPV 04/14/2021 9 2  8 9 - 12 7 fL Final    Platelets 94/13/2256 328  149 - 390 Thousands/uL Final    nRBC 04/14/2021 0  /100 WBCs Final    Neutrophils Relative 04/14/2021 62  43 - 75 % Final    Immat GRANS % 04/14/2021 0  0 - 2 % Final    Lymphocytes Relative 04/14/2021 26  14 - 44 % Final    Monocytes Relative 04/14/2021 9  4 - 12 % Final    Eosinophils Relative 04/14/2021 2  0 - 6 % Final  Basophils Relative 04/14/2021 1  0 - 1 % Final    Neutrophils Absolute 04/14/2021 5 07  1 85 - 7 62 Thousands/µL Final    Immature Grans Absolute 04/14/2021 0 02  0 00 - 0 20 Thousand/uL Final    Lymphocytes Absolute 04/14/2021 2 14  0 60 - 4 47 Thousands/µL Final    Monocytes Absolute 04/14/2021 0 73  0 17 - 1 22 Thousand/µL Final    Eosinophils Absolute 04/14/2021 0 16  0 00 - 0 61 Thousand/µL Final    Basophils Absolute 04/14/2021 0 08  0 00 - 0 10 Thousands/µL Final    CRP 04/14/2021 12 9* <3 0 mg/L Final    Sed Rate 04/14/2021 16  0 - 29 mm/hour Final   Orders Only on 03/08/2021   Component Date Value Ref Range Status    SARS-CoV-2 03/08/2021 Positive* Negative Final   Appointment on 02/10/2021   Component Date Value Ref Range Status    WBC 02/10/2021 6 21  4 31 - 10 16 Thousand/uL Final    RBC 02/10/2021 4 33  3 81 - 5 12 Million/uL Final    Hemoglobin 02/10/2021 13 4  11 5 - 15 4 g/dL Final    Hematocrit 02/10/2021 41 1  34 8 - 46 1 % Final    MCV 02/10/2021 95  82 - 98 fL Final    MCH 02/10/2021 30 9  26 8 - 34 3 pg Final    MCHC 02/10/2021 32 6  31 4 - 37 4 g/dL Final    RDW 02/10/2021 12 9  11 6 - 15 1 % Final    MPV 02/10/2021 9 5  8 9 - 12 7 fL Final    Platelets 70/50/4004 332  149 - 390 Thousands/uL Final    nRBC 02/10/2021 0  /100 WBCs Final    Neutrophils Relative 02/10/2021 63  43 - 75 % Final    Immat GRANS % 02/10/2021 0  0 - 2 % Final    Lymphocytes Relative 02/10/2021 26  14 - 44 % Final    Monocytes Relative 02/10/2021 7  4 - 12 % Final    Eosinophils Relative 02/10/2021 3  0 - 6 % Final    Basophils Relative 02/10/2021 1  0 - 1 % Final    Neutrophils Absolute 02/10/2021 3 91  1 85 - 7 62 Thousands/µL Final    Immature Grans Absolute 02/10/2021 0 01  0 00 - 0 20 Thousand/uL Final    Lymphocytes Absolute 02/10/2021 1 62  0 60 - 4 47 Thousands/µL Final    Monocytes Absolute 02/10/2021 0 46  0 17 - 1 22 Thousand/µL Final    Eosinophils Absolute 02/10/2021 0 16 0 00 - 0 61 Thousand/µL Final    Basophils Absolute 02/10/2021 0 05  0 00 - 0 10 Thousands/µL Final    Sodium 02/10/2021 141  136 - 145 mmol/L Final    Potassium 02/10/2021 5 4* 3 5 - 5 3 mmol/L Final    Chloride 02/10/2021 108  100 - 108 mmol/L Final    CO2 02/10/2021 27  21 - 32 mmol/L Final    ANION GAP 02/10/2021 6  4 - 13 mmol/L Final    BUN 02/10/2021 17  5 - 25 mg/dL Final    Creatinine 02/10/2021 0 73  0 60 - 1 30 mg/dL Final    Standardized to IDMS reference method    Glucose, Fasting 02/10/2021 87  65 - 99 mg/dL Final    Specimen collection should occur prior to Sulfasalazine administration due to the potential for falsely depressed results  Specimen collection should occur prior to Sulfapyridine administration due to the potential for falsely elevated results   Calcium 02/10/2021 9 0  8 3 - 10 1 mg/dL Final    AST 02/10/2021 23  5 - 45 U/L Final    Specimen collection should occur prior to Sulfasalazine administration due to the potential for falsely depressed results   ALT 02/10/2021 58  12 - 78 U/L Final    Specimen collection should occur prior to Sulfasalazine administration due to the potential for falsely depressed results   Alkaline Phosphatase 02/10/2021 58  46 - 116 U/L Final    Total Protein 02/10/2021 7 1  6 4 - 8 2 g/dL Final    Albumin 02/10/2021 3 7  3 5 - 5 0 g/dL Final    Total Bilirubin 02/10/2021 0 28  0 20 - 1 00 mg/dL Final    Use of this assay is not recommended for patients undergoing treatment with eltrombopag due to the potential for falsely elevated results   eGFR 02/10/2021 95  ml/min/1 73sq m Final    CRP 02/10/2021 4 6* <3 0 mg/L Final    Sed Rate 02/10/2021 13  0 - 29 mm/hour Final    Vit D, 25-Hydroxy 02/10/2021 23 9* 30 0 - 100 0 ng/mL Final     Large joint arthrocentesis: L knee  Universal Protocol:  Consent: Verbal consent obtained  Written consent not obtained    Risks and benefits: risks, benefits and alternatives were discussed  Consent given by: patient  Time out: Immediately prior to procedure a "time out" was called to verify the correct patient, procedure, equipment, support staff and site/side marked as required  Timeout called at: 7/14/2021 2:54 PM   Patient understanding: patient states understanding of the procedure being performed  Patient consent: the patient's understanding of the procedure matches consent given  Procedure consent: procedure consent matches procedure scheduled  Relevant documents: relevant documents present and verified  Test results: test results available and properly labeled  Site marked: the operative site was marked  Radiology Images displayed and confirmed  If images not available, report reviewed: imaging studies available  Required items: required blood products, implants, devices, and special equipment available  Patient identity confirmed: verbally with patient    Supporting Documentation  Indications: pain and joint swelling   Procedure Details  Location: knee - L knee  Preparation: Patient was prepped and draped in the usual sterile fashion  Needle size: 25 G  Ultrasound guidance: no  Approach: anterolateral    Patient tolerance: patient tolerated the procedure well with no immediate complications  Dressing:  Sterile dressing applied    After discussing the risks/benefits of left knee steroid injection, including minor risk of infection, bleeding, pain, or ineffectiveness, informed consent was obtained and patient was agreeable to proceed  Using sterile technique, the left knee was prepped with Chloraprep, and was topically anesthetized with Ethyl Chloride  The joint was entered using an anterolateral approach and Kenalog 40 mg with 1mL bupivicaine 0 25% was then injected and the needle withdrawn  The procedure was well tolerated  Patient was instructed to call our office with any concerns or questions

## 2021-07-26 DIAGNOSIS — R63.5 WEIGHT GAIN: ICD-10-CM

## 2021-07-26 RX ORDER — PHENTERMINE HYDROCHLORIDE 30 MG/1
30 CAPSULE ORAL EVERY MORNING
Qty: 30 CAPSULE | Refills: 0 | Status: SHIPPED | OUTPATIENT
Start: 2021-07-26 | End: 2021-09-01 | Stop reason: SDUPTHER

## 2021-08-13 ENCOUNTER — CONSULT (OUTPATIENT)
Dept: PAIN MEDICINE | Facility: CLINIC | Age: 51
End: 2021-08-13
Payer: COMMERCIAL

## 2021-08-13 VITALS
WEIGHT: 175 LBS | HEART RATE: 80 BPM | HEIGHT: 63 IN | SYSTOLIC BLOOD PRESSURE: 131 MMHG | RESPIRATION RATE: 16 BRPM | DIASTOLIC BLOOD PRESSURE: 86 MMHG | BODY MASS INDEX: 31.01 KG/M2

## 2021-08-13 DIAGNOSIS — M54.40 LOW BACK PAIN WITH SCIATICA, SCIATICA LATERALITY UNSPECIFIED, UNSPECIFIED BACK PAIN LATERALITY, UNSPECIFIED CHRONICITY: ICD-10-CM

## 2021-08-13 DIAGNOSIS — M47.816 LUMBAR SPONDYLOSIS: ICD-10-CM

## 2021-08-13 DIAGNOSIS — M54.16 LUMBAR RADICULOPATHY: Primary | ICD-10-CM

## 2021-08-13 DIAGNOSIS — M51.26 LUMBAR DISC HERNIATION: ICD-10-CM

## 2021-08-13 PROCEDURE — 99204 OFFICE O/P NEW MOD 45 MIN: CPT | Performed by: ANESTHESIOLOGY

## 2021-08-13 PROCEDURE — 3008F BODY MASS INDEX DOCD: CPT | Performed by: ANESTHESIOLOGY

## 2021-08-13 PROCEDURE — 1036F TOBACCO NON-USER: CPT | Performed by: ANESTHESIOLOGY

## 2021-08-13 NOTE — PROGRESS NOTES
Assessment  1  Lumbar radiculopathy    2  Low back pain with sciatica, sciatica laterality unspecified, unspecified back pain laterality, unspecified chronicity    3  Lumbar disc herniation    4  Lumbar spondylosis        Plan  71-year-old female with a history of RA, referred by Dr Bobbi Dooley, presenting for initial consultation regarding a 7 month history of lumbosacral back pain that radiates into posterior aspect of the left lower extremity to the foot with associated numbness and occasional subjective weakness  She denies any trauma or inciting event  MRI of the lumbar spine shows facet arthropathy from L3-4-L5-S1  Disc herniation at L5-S1 impinging on the left S1 nerve root in the lateral recess  The patient has tried gabapentin, however this did not provide any relief and caused significant side effects, therefore is discontinued  She has tried a course of oral steroids which was ineffective  She has not done any recent formal physical therapy or chiropractic treatment, but does do a home exercise program consistently which provides minimal transient relief  Tylenol and NSAIDs provide minimal relief  Patient's symptoms are consistent with S1 radiculopathy stemming from L5-S1 disc herniation  1  I will schedule the patient for left L5 and S1 TFESI to reduce the inflammatory component of her pain  2  Patient will continue with her home exercise program and I did offer her physical therapy, however the patient declined  3  I will follow up the patient in 6 weeks       Complete risks and benefits including bleeding, infection, tissue reaction, nerve injury and allergic reaction were discussed  The approach was demonstrated using models and literature was provided  Verbal and written consent was obtained  My impressions and treatment recommendations were discussed in detail with the patient who verbalized understanding and had no further questions  Discharge instructions were provided   I personally saw and examined the patient and I agree with the above discussed plan of care  No orders of the defined types were placed in this encounter  No orders of the defined types were placed in this encounter  History of Present Illness    Nery Bruner is a 45 y o  female with a history of RA, referred by Dr Josefina Villalta, presenting for initial consultation regarding a 7 month history of lumbosacral back pain that radiates into posterior aspect of the left lower extremity to the foot with associated numbness and occasional subjective weakness  He denies any right lower extremity symptoms, bladder bowel incontinence, or saddle anesthesia  She denies any trauma or inciting event  MRI of the lumbar spine shows facet arthropathy from L3-4-L5-S1  Disc herniation at L5-S1 impinging on the left S1 nerve root in the lateral recess  The patient has tried gabapentin, however this did not provide any relief and caused significant side effects, therefore is discontinued  She has tried a course of oral steroids which was ineffective  She has not done any recent formal physical therapy or chiropractic treatment, but does do a home exercise program consistently which provides minimal transient relief  Tylenol and NSAIDs provide minimal relief  The patient rates her pain as moderate and intermittent  The pain is not follow any particular pattern throughout the day  The pain is described as sharp and numbness  The patient denies any specific aggravating or alleviating factors  Other than as stated above, the patient denies any interval changes in medications, medical condition, mental condition, symptoms, or allergies since the last office visit  I have personally reviewed and/or updated the patient's past medical history, past surgical history, family history, social history, current medications, allergies, and vital signs today       Review of Systems   Constitutional: Negative for fever and unexpected weight change  HENT: Negative for trouble swallowing  Eyes: Negative for visual disturbance  Respiratory: Negative for shortness of breath and wheezing  Cardiovascular: Negative for chest pain and palpitations  Gastrointestinal: Negative for constipation, diarrhea, nausea and vomiting  Endocrine: Negative for cold intolerance, heat intolerance and polydipsia  Genitourinary: Negative for difficulty urinating and frequency  Musculoskeletal: Positive for back pain and gait problem  Negative for arthralgias, joint swelling and myalgias  LLE Pain   Skin: Negative for rash  Neurological: Positive for numbness  Negative for dizziness, seizures, syncope, weakness and headaches  Hematological: Does not bruise/bleed easily  Psychiatric/Behavioral: Negative for dysphoric mood  All other systems reviewed and are negative  Patient Active Problem List   Diagnosis    Rheumatoid arthritis involving multiple sites with positive rheumatoid factor (HCC)    High risk medication use    Arthritis    Calcific tendinitis    Cervical pain    COVID-19       Past Medical History:   Diagnosis Date    Anxiety     Depression        Past Surgical History:   Procedure Laterality Date    ROTATOR CUFF REPAIR         No family history on file      Social History     Occupational History    Not on file   Tobacco Use    Smoking status: Former Smoker    Smokeless tobacco: Current User    Tobacco comment: occasional vaping use   Vaping Use    Vaping Use: Every day   Substance and Sexual Activity    Alcohol use: Yes     Comment: occassionally    Drug use: Never    Sexual activity: Not on file       Current Outpatient Medications on File Prior to Visit   Medication Sig    Acetaminophen (TYLENOL ARTHRITIS PAIN PO) Take by mouth    Ascorbic Acid (vitamin C) 1000 MG tablet Take 1,000 mg by mouth daily    cholecalciferol (VITAMIN D3) 1,000 units tablet Take 2,000 Units by mouth    cyclobenzaprine (FLEXERIL) 5 mg tablet Take 1 tablet (5 mg total) by mouth 3 (three) times a day as needed for muscle spasms (Patient not taking: Reported on 4/19/2021)    diclofenac (VOLTAREN) 75 mg EC tablet Take 1 tablet (75 mg total) by mouth 2 (two) times a day Can take as needed for joint pain    Diclofenac Sodium (VOLTAREN) 1 % Apply 2 g topically 4 (four) times a day    folic acid (FOLVITE) 1 mg tablet Take 1 tablet (1 mg total) by mouth daily    gabapentin (NEURONTIN) 300 mg capsule Take 1 capsule (300 mg total) by mouth 3 (three) times a day As needed for numbness    hydroxychloroquine (PLAQUENIL) 200 mg tablet Take 1 tablet (200 mg total) by mouth 2 (two) times a day    magnesium gluconate (MAGONATE) 500 mg tablet Take 500 mg by mouth daily    methotrexate 2 5 mg tablet 8 tablet po weekly (take all 8 tablets on the same day every week)    multivitamin (THERAGRAN) TABS Take 1 tablet by mouth daily    Omega-3 Fatty Acids (Fish Oil Concentrate) 300 MG CAPS Take 1 capsule by mouth (Patient not taking: Reported on 7/9/2021)    oxyCODONE (ROXICODONE) 5 mg immediate release tablet Take 1 tablet (5 mg total) by mouth every 6 (six) hours as needed for moderate pain or severe painMax Daily Amount: 20 mg (Patient not taking: Reported on 4/19/2021)    phentermine 30 MG capsule Take 1 capsule (30 mg total) by mouth every morning    pyridoxine (B-6) 100 MG tablet Take 100 mg by mouth    TRAMADOL HCL PO Take by mouth 50mg as needed (Patient not taking: Reported on 7/9/2021)    vitamin B-12 (VITAMIN B-12) 1,000 mcg tablet Take by mouth daily    zinc gluconate 50 mg tablet Take 50 mg by mouth daily     No current facility-administered medications on file prior to visit  Allergies   Allergen Reactions    Clarithromycin GI Intolerance       Physical Exam    There were no vitals taken for this visit      Constitutional: normal, well developed, well nourished, alert, in no distress and non-toxic and no overt pain behavior  Eyes: anicteric  HEENT: grossly intact  Neck: supple, symmetric, trachea midline and no masses   Pulmonary:even and unlabored  Cardiovascular:No edema or pitting edema present  Skin:Normal without rashes or lesions and well hydrated  Psychiatric:Mood and affect appropriate  Neurologic:Cranial Nerves II-XII grossly intact  Musculoskeletal:antalgic gait  Bilateral lumbar paraspinals tender to palpation ropy in texture  Bilateral SI joints and trochanteric flares nontender to palpation  Bilateral patellar and right Achilles reflex 2/4  Left Achilles reflex 1/4  No clonus was noted bilaterally  Bilateral lower extremity strength 5/5 in all muscle groups  Sensation diminished to light touch in the S1 distribution of the left leg  Positive straight leg raise on the left and negative on the right  Negative Ty's test bilaterally  Imaging    Study Result    Narrative & Impression   MRI LUMBAR SPINE WITH AND WITHOUT CONTRAST     INDICATION: R20 0: Anesthesia of skin  R20 8: Other disturbances of skin sensation  M54 5: Low back pain  Left leg numbness  Low back pain      COMPARISON:  None      TECHNIQUE:  Sagittal T1, sagittal T2, sagittal inversion recovery, axial T1 and axial T2, coronal T2  Sagittal and axial T1 postcontrast      IV Contrast:  8 mL of Gadobutrol injection (SINGLE-DOSE)      IMAGE QUALITY:  Diagnostic     FINDINGS:     VERTEBRAL BODIES:  There are 5 lumbar type vertebral bodies  Normal alignment of the lumbar spine  No spondylolysis or spondylolisthesis  No scoliosis  No compression fracture  Normal marrow signal is identified within the visualized bony   structures  No discrete marrow lesion      SACRUM:  Normal signal within the sacrum   No evidence of insufficiency or stress fracture      DISTAL CORD AND CONUS:  Normal size and signal within the distal cord and conus      PARASPINAL SOFT TISSUES:  Paraspinal soft tissues are unremarkable      LOWER THORACIC DISC SPACES:  Normal disc height and signal   No disc herniation, canal stenosis or foraminal narrowing      LUMBAR DISC SPACES:     L1-L2:  Normal      L2-L3:  Normal      L3-L4:  Mild facet hypertrophy  Minimal annular bulge  No significant central or foraminal narrowing      L4-L5:  Mild facet hypertrophy  No significant central or foraminal narrowing      L5-S1:  Central and left paramedian protrusion type disc herniation identified potentially impinging the left S1 nerve root in the lateral recess  Correlate for left S1 radiculopathy  Mild facet hypertrophy noted  Foramina are patent      POSTCONTRAST IMAGING:  No abnormal enhancement      IMPRESSION:     Central and left paramedian protrusion type disc herniation at L5-S1 contacts and potentially impacts the left S1 nerve root    Correlate for left S1 radiculopathy      Mild degenerative change results were described

## 2021-08-13 NOTE — PATIENT INSTRUCTIONS
Epidural Steroid Injection   WHAT YOU NEED TO KNOW:   What do I need to know about an epidural steroid injection (HANS)? An HANS is a procedure to inject steroid medicine into the epidural space  The epidural space is between your spinal cord and vertebrae  Steroids reduce inflammation and fluid buildup in your spine that may be causing pain  You may be given pain medicine along with the steroids  How do I prepare for an HANS? Your healthcare provider will talk to you about how to prepare for your procedure  He or she will tell you what medicines to take or not take on the day of your procedure  You may need to stop taking blood thinners or other medicines several days before your procedure  You may need to adjust any diabetes medicine you take on the day of your procedure  Steroid medicine can increase your blood sugar level  Arrange for someone to drive you home when you are discharged  What will happen during an HANS? · You will be given medicine to numb the procedure area  You will be awake for the procedure, but you will not feel pain  You may also be given medicine to help you relax  Contrast liquid will be used to help your healthcare provider see the area better  Tell the healthcare provider if you have ever had an allergic reaction to contrast liquid  · Your healthcare provider may place the needle into your neck area, middle of your back, or tailbone area  He may inject the medicine next to the nerves that are causing your pain  He may instead inject the medicine into a larger area of the epidural space  This helps the medicine spread to more nerves  Your healthcare provider will use a fluoroscope to help guide the needle to the right place  A fluoroscope is a type of x-ray  After the procedure, a bandage will be placed over the injection site to prevent infection  What will happen after an HANS? You will have a bandage over the injection site to prevent infection   Your healthcare provider will tell you when you can bathe and any activity guidelines  You will be able to go home  What are the risks of an HANS? You may have temporary or permanent nerve damage or paralysis  You may have bleeding or develop a serious infection, such as meningitis (swelling of the brain coverings)  An abscess may also develop  An abscess is a pus-filled area under the skin  You may need surgery to fix the abscess  You may have a seizure, anxiety, or trouble sleeping  If you are a man, you may have temporary erectile dysfunction (not able to have an erection)  CARE AGREEMENT:   You have the right to help plan your care  Learn about your health condition and how it may be treated  Discuss treatment options with your healthcare providers to decide what care you want to receive  You always have the right to refuse treatment  The above information is an  only  It is not intended as medical advice for individual conditions or treatments  Talk to your doctor, nurse or pharmacist before following any medical regimen to see if it is safe and effective for you  © Copyright [facility] 2021 Information is for End User's use only and may not be sold, redistributed or otherwise used for commercial purposes   All illustrations and images included in CareNotes® are the copyrighted property of [employer]  or [address] [place] [address] [employer]

## 2021-08-17 ENCOUNTER — TELEPHONE (OUTPATIENT)
Dept: PAIN MEDICINE | Facility: CLINIC | Age: 51
End: 2021-08-17

## 2021-08-17 DIAGNOSIS — M54.40 LOW BACK PAIN WITH SCIATICA, SCIATICA LATERALITY UNSPECIFIED, UNSPECIFIED BACK PAIN LATERALITY, UNSPECIFIED CHRONICITY: Primary | ICD-10-CM

## 2021-08-17 RX ORDER — METHYLPREDNISOLONE 4 MG/1
TABLET ORAL
Qty: 1 EACH | Refills: 0 | Status: SHIPPED | OUTPATIENT
Start: 2021-08-17 | End: 2021-12-13 | Stop reason: ALTCHOICE

## 2021-08-17 NOTE — TELEPHONE ENCOUNTER
I am sorry the patient is experiencing a flare as a result of my physical exam, however I was a gentle as I could be on physical exam, and physical exam is necessary and crucial to diagnosis and guiding treatment  I will prescribe a Medrol Dosepak to assist in pain relief at this time since she was unable tolerate gabapentin in the past   Hopefully this will calm the pain down until injection is able to be performed

## 2021-08-17 NOTE — TELEPHONE ENCOUNTER
Attempted to call the patient and left a detailed mom in regards to the previous message  Will f/u tomorrow

## 2021-08-17 NOTE — TELEPHONE ENCOUNTER
Patient states she is in extreme pain in her lower back, she would like to speak to a nurse       Please advise    [phone]

## 2021-08-17 NOTE — TELEPHONE ENCOUNTER
The patient is having an acute flare and the Medrol Dosepak is to get her over the acute flare  The injection is to help alleviate her chronic pain she was describing at her initial consult  If the patient does not want to take the Medrol Dosepak that is up to her  She can go to the ER if the pain becomes that severe    Otherwise, the patient continue with diclofenac as prescribed and I have no other recommendations at this time

## 2021-08-17 NOTE — TELEPHONE ENCOUNTER
S/w the patient and she stated since her consult last Friday after your physical exam  She stated when you pulled her leg up and crossed her knee  When she got home she has been having excruciating pain in her LB  She stated it has been constant  She even tried to use flexeril and left over oxy over the weekend for pain and it's unending  She is having difficulty sitting and she went to work today and the pain is intolerable  No loss of B& B  She is scheduled for 9/13  She stated you aggravated her back and now she is in excruciating pain and the oxy is the only thing that actually helped it  Reviewed that we will not be prescribing oxy and that is a process  Inquired about a steroid pack and she stated that doesn't help  Encouraged her to use ice to the area and if the pain was out of control then at this point then she should seek treatment at the ED  Please advise otherwise  She did verbally threaten to come into the office today to  have you fix this  Because she was not in this much pain before she saw you on Friday

## 2021-08-18 NOTE — TELEPHONE ENCOUNTER
S/w pt and advised of same  Pt verbalized understanding and per pt she did not  the dose pack  Pt states she tried ice, stretches and a dose oc cyclobenzaprine with relief

## 2021-09-01 DIAGNOSIS — R63.5 WEIGHT GAIN: ICD-10-CM

## 2021-09-01 RX ORDER — PHENTERMINE HYDROCHLORIDE 30 MG/1
30 CAPSULE ORAL EVERY MORNING
Qty: 30 CAPSULE | Refills: 0 | Status: SHIPPED | OUTPATIENT
Start: 2021-09-01 | End: 2021-10-11 | Stop reason: SDUPTHER

## 2021-09-13 ENCOUNTER — HOSPITAL ENCOUNTER (OUTPATIENT)
Dept: RADIOLOGY | Facility: CLINIC | Age: 51
Discharge: HOME | End: 2021-09-13
Attending: ANESTHESIOLOGY
Payer: COMMERCIAL

## 2021-09-13 VITALS
TEMPERATURE: 98.9 F | DIASTOLIC BLOOD PRESSURE: 77 MMHG | OXYGEN SATURATION: 98 % | RESPIRATION RATE: 20 BRPM | SYSTOLIC BLOOD PRESSURE: 113 MMHG | HEART RATE: 96 BPM

## 2021-09-13 DIAGNOSIS — M54.16 LUMBAR RADICULOPATHY: ICD-10-CM

## 2021-09-13 PROCEDURE — 64483 NJX AA&/STRD TFRM EPI L/S 1: CPT | Performed by: ANESTHESIOLOGY

## 2021-09-13 PROCEDURE — 64484 NJX AA&/STRD TFRM EPI L/S EA: CPT | Performed by: ANESTHESIOLOGY

## 2021-09-13 RX ORDER — PAPAVERINE HCL 150 MG
20 CAPSULE, EXTENDED RELEASE ORAL ONCE
Status: COMPLETED | OUTPATIENT
Start: 2021-09-13 | End: 2021-09-13

## 2021-09-13 RX ADMIN — IOHEXOL 2 ML: 300 INJECTION, SOLUTION INTRAVENOUS at 14:01

## 2021-09-13 RX ADMIN — LIDOCAINE HYDROCHLORIDE 2 ML: 20 INJECTION, SOLUTION EPIDURAL; INFILTRATION; INTRACAUDAL; PERINEURAL at 14:02

## 2021-09-13 RX ADMIN — DEXAMETHASONE SODIUM PHOSPHATE 15 MG: 10 INJECTION, SOLUTION INTRAMUSCULAR; INTRAVENOUS at 14:02

## 2021-09-13 NOTE — DISCHARGE INSTRUCTIONS
Epidural Steroid Injection   WHAT YOU NEED TO KNOW:   An epidural steroid injection (HANS) is a procedure to inject steroid medicine into the epidural space  The epidural space is between your spinal cord and vertebrae  Steroids reduce inflammation and fluid buildup in your spine that may be causing pain  You may be given pain medicine along with the steroids  ACTIVITY  · Do not drive or operate machinery today  · No strenuous activity today - bending, lifting, etc   · You may resume normal activites starting tomorrow - start slowly and as tolerated  · You may shower today, but no tub baths or hot tubs  · You may have numbness for several hours from the local anesthetic  Please use caution and common sense, especially with weight-bearing activities  CARE OF THE INJECTION SITE  · If you have soreness or pain, apply ice to the area today (20 minutes on/20 minutes off)  · Starting tomorrow, you may use warm, moist heat or ice if needed  · You may have an increase or change in your discomfort for 36-48 hours after your treatment  · Apply ice and continue with any pain medication you have been prescribed  · Notify [facility] if you have any of the following: redness, drainage, swelling, headache, stiff neck or fever above 100°F     SPECIAL INSTRUCTIONS  · Our office will contact you in approximately 7 days for a progress report  MEDICATIONS  · Continue to take all routine medications  · Our office may have instructed you to hold some medications  As no general anesthesia was used in today's procedure, you should not experience any side effects related to anesthesia  If you have a problem specifically related to your procedure, please call our office at [phone]  Problems not related to your procedure should be directed to your primary care physician

## 2021-09-13 NOTE — H&P
History of Present Illness: The patient is a 45 y o  female who presents with complaints of  Low back and leg pain      Patient Active Problem List   Diagnosis    Rheumatoid arthritis involving multiple sites with positive rheumatoid factor (HCC)    High risk medication use    Arthritis    Calcific tendinitis    Cervical pain    COVID-19    Low back pain with sciatica    Lumbar radiculopathy    Lumbar disc herniation    Lumbar spondylosis       Past Medical History:   Diagnosis Date    Anxiety     Depression        Past Surgical History:   Procedure Laterality Date    ROTATOR CUFF REPAIR           Current Outpatient Medications:     Acetaminophen (TYLENOL ARTHRITIS PAIN PO), Take by mouth, Disp: , Rfl:     Ascorbic Acid (vitamin C) 1000 MG tablet, Take 1,000 mg by mouth daily, Disp: , Rfl:     cholecalciferol (VITAMIN D3) 1,000 units tablet, Take 2,000 Units by mouth, Disp: , Rfl:     cyclobenzaprine (FLEXERIL) 5 mg tablet, Take 1 tablet (5 mg total) by mouth 3 (three) times a day as needed for muscle spasms (Patient not taking: Reported on 4/19/2021), Disp: 40 tablet, Rfl: 1    diclofenac (VOLTAREN) 75 mg EC tablet, Take 1 tablet (75 mg total) by mouth 2 (two) times a day Can take as needed for joint pain, Disp: 180 tablet, Rfl: 1    Diclofenac Sodium (VOLTAREN) 1 %, Apply 2 g topically 4 (four) times a day, Disp: 300 g, Rfl: 3    folic acid (FOLVITE) 1 mg tablet, Take 1 tablet (1 mg total) by mouth daily, Disp: 90 tablet, Rfl: 1    gabapentin (NEURONTIN) 300 mg capsule, Take 1 capsule (300 mg total) by mouth 3 (three) times a day As needed for numbness (Patient not taking: Reported on 8/13/2021), Disp: 90 capsule, Rfl: 3    hydroxychloroquine (PLAQUENIL) 200 mg tablet, Take 1 tablet (200 mg total) by mouth 2 (two) times a day, Disp: 180 tablet, Rfl: 1    magnesium gluconate (MAGONATE) 500 mg tablet, Take 500 mg by mouth daily, Disp: , Rfl:     methotrexate 2 5 mg tablet, 8 tablet po weekly (take all 8 tablets on the same day every week), Disp: 96 tablet, Rfl: 1    methylPREDNISolone 4 MG tablet therapy pack, Use as directed on package, Disp: 1 each, Rfl: 0    multivitamin (THERAGRAN) TABS, Take 1 tablet by mouth daily, Disp: , Rfl:     Omega-3 Fatty Acids (Fish Oil Concentrate) 300 MG CAPS, Take 1 capsule by mouth (Patient not taking: Reported on 7/9/2021), Disp: , Rfl:     oxyCODONE (ROXICODONE) 5 mg immediate release tablet, Take 1 tablet (5 mg total) by mouth every 6 (six) hours as needed for moderate pain or severe painMax Daily Amount: 20 mg (Patient not taking: Reported on 4/19/2021), Disp: 20 tablet, Rfl: 0    phentermine 30 MG capsule, Take 1 capsule (30 mg total) by mouth every morning, Disp: 30 capsule, Rfl: 0    pyridoxine (B-6) 100 MG tablet, Take 100 mg by mouth, Disp: , Rfl:     TRAMADOL HCL PO, Take by mouth 50mg as needed (Patient not taking: Reported on 7/9/2021), Disp: , Rfl:     vitamin B-12 (VITAMIN B-12) 1,000 mcg tablet, Take by mouth daily, Disp: , Rfl:     zinc gluconate 50 mg tablet, Take 50 mg by mouth daily, Disp: , Rfl:     Current Facility-Administered Medications:     dexamethasone (PF) (DECADRON) injection 20 mg, 20 mg, Epidural, Once, Marito CHERRIE Morset, DO    iohexol (OMNIPAQUE) 300 mg/mL injection 50 mL, 50 mL, Epidural, Once, Marito CHERRIE Morset, DO    lidocaine (PF) (XYLOCAINE-MPF) 2 % injection 2 mL, 2 mL, Epidural, Once, Marito CHERRIE Morset, DO    Allergies   Allergen Reactions    Clarithromycin GI Intolerance       Physical Exam:   Vitals:    09/13/21 1331   BP: 113/77   Pulse: 96   Resp: 20   Temp: 98 9 °F (37 2 °C)   SpO2: 98%     General: Awake, Alert, Oriented x 3, Mood and affect appropriate  Respiratory: Respirations even and unlabored  Cardiovascular: Peripheral pulses intact; no edema  Musculoskeletal Exam:   Left lumbar paraspinals tender to palpation      ASA Score: 3    Patient/Chart Verification  Patient ID Verified: Verbal  ID Band Applied: No  Consents Confirmed: Procedural, To be obtained in the Pre-Procedure area  H&P( within 30 days) Verified: To be obtained in the Pre-Procedure area  Allergies Reviewed: Yes  Anticoag/NSAID held?: NA  Currently on antibiotics?: No  Pregnancy denied?: Yes    Assessment:   1   Lumbar radiculopathy        Plan: Left L5 and S1 TFESI

## 2021-09-20 ENCOUNTER — TELEPHONE (OUTPATIENT)
Dept: PAIN MEDICINE | Facility: CLINIC | Age: 51
End: 2021-09-20

## 2021-09-20 NOTE — TELEPHONE ENCOUNTER
Pt reports no improvement post inj   She said her pain is just an annoyance  She is aware I will call next week for an update

## 2021-09-27 ENCOUNTER — RA CDI HCC (OUTPATIENT)
Dept: OTHER | Facility: HOSPITAL | Age: 51
End: 2021-09-27

## 2021-09-27 NOTE — TELEPHONE ENCOUNTER
Pt reports still no improvement 2wk post inj  Pain level 4/10 (ache)  She said when she's at work it goes up to 7-8/10  No relief in the numbness

## 2021-09-27 NOTE — PROGRESS NOTES
[facility]  coding opportunities       Chart reviewed, no opportunity found: CHART REVIEWED, NO OPPORTUNITY FOUND                        Patients insurance company: [employer] (Medicare and Commercial for [employer] and [employer])

## 2021-10-04 ENCOUNTER — OFFICE VISIT (OUTPATIENT)
Dept: PAIN MEDICINE | Facility: CLINIC | Age: 51
End: 2021-10-04
Payer: COMMERCIAL

## 2021-10-04 VITALS
HEART RATE: 71 BPM | WEIGHT: 175 LBS | BODY MASS INDEX: 31.01 KG/M2 | SYSTOLIC BLOOD PRESSURE: 135 MMHG | DIASTOLIC BLOOD PRESSURE: 83 MMHG | HEIGHT: 63 IN

## 2021-10-04 DIAGNOSIS — M51.26 LUMBAR DISC HERNIATION: ICD-10-CM

## 2021-10-04 DIAGNOSIS — M47.816 LUMBAR SPONDYLOSIS: ICD-10-CM

## 2021-10-04 DIAGNOSIS — M54.16 LUMBAR RADICULOPATHY: Primary | ICD-10-CM

## 2021-10-04 PROCEDURE — 99214 OFFICE O/P EST MOD 30 MIN: CPT | Performed by: NURSE PRACTITIONER

## 2021-10-11 DIAGNOSIS — R63.5 WEIGHT GAIN: ICD-10-CM

## 2021-10-11 RX ORDER — PHENTERMINE HYDROCHLORIDE 30 MG/1
30 CAPSULE ORAL EVERY MORNING
Qty: 30 CAPSULE | Refills: 0 | Status: SHIPPED | OUTPATIENT
Start: 2021-10-11 | End: 2021-11-18 | Stop reason: SDUPTHER

## 2021-10-13 ENCOUNTER — OFFICE VISIT (OUTPATIENT)
Dept: RHEUMATOLOGY | Facility: CLINIC | Age: 51
End: 2021-10-13
Payer: COMMERCIAL

## 2021-10-13 ENCOUNTER — APPOINTMENT (OUTPATIENT)
Dept: LAB | Facility: CLINIC | Age: 51
End: 2021-10-13
Payer: COMMERCIAL

## 2021-10-13 VITALS
BODY MASS INDEX: 31.01 KG/M2 | WEIGHT: 175 LBS | HEIGHT: 63 IN | SYSTOLIC BLOOD PRESSURE: 130 MMHG | DIASTOLIC BLOOD PRESSURE: 80 MMHG

## 2021-10-13 DIAGNOSIS — M17.12 OSTEOARTHRITIS OF LEFT KNEE, UNSPECIFIED OSTEOARTHRITIS TYPE: ICD-10-CM

## 2021-10-13 DIAGNOSIS — Z79.899 HIGH RISK MEDICATION USE: ICD-10-CM

## 2021-10-13 DIAGNOSIS — G62.9 NEUROPATHY: ICD-10-CM

## 2021-10-13 DIAGNOSIS — M25.50 ARTHRALGIA, UNSPECIFIED JOINT: ICD-10-CM

## 2021-10-13 DIAGNOSIS — M05.79 RHEUMATOID ARTHRITIS INVOLVING MULTIPLE SITES WITH POSITIVE RHEUMATOID FACTOR (HCC): Primary | ICD-10-CM

## 2021-10-13 LAB
ALBUMIN SERPL BCP-MCNC: 4 G/DL (ref 3.5–5)
ALP SERPL-CCNC: 57 U/L (ref 46–116)
ALT SERPL W P-5'-P-CCNC: 43 U/L (ref 12–78)
ANION GAP SERPL CALCULATED.3IONS-SCNC: 7 MMOL/L (ref 4–13)
AST SERPL W P-5'-P-CCNC: 18 U/L (ref 5–45)
BASOPHILS # BLD AUTO: 0.06 THOUSANDS/ΜL (ref 0–0.1)
BASOPHILS NFR BLD AUTO: 1 % (ref 0–1)
BILIRUB SERPL-MCNC: 0.12 MG/DL (ref 0.2–1)
BUN SERPL-MCNC: 27 MG/DL (ref 5–25)
CALCIUM SERPL-MCNC: 8.8 MG/DL (ref 8.3–10.1)
CHLORIDE SERPL-SCNC: 104 MMOL/L (ref 100–108)
CO2 SERPL-SCNC: 27 MMOL/L (ref 21–32)
CREAT SERPL-MCNC: 0.94 MG/DL (ref 0.6–1.3)
CRP SERPL QL: <3 MG/L
EOSINOPHIL # BLD AUTO: 0.17 THOUSAND/ΜL (ref 0–0.61)
EOSINOPHIL NFR BLD AUTO: 2 % (ref 0–6)
ERYTHROCYTE [DISTWIDTH] IN BLOOD BY AUTOMATED COUNT: 12.7 % (ref 11.6–15.1)
ERYTHROCYTE [SEDIMENTATION RATE] IN BLOOD: 8 MM/HOUR (ref 0–29)
GFR SERPL CREATININE-BSD FRML MDRD: 70 ML/MIN/1.73SQ M
GLUCOSE SERPL-MCNC: 90 MG/DL (ref 65–140)
HCT VFR BLD AUTO: 34.4 % (ref 34.8–46.1)
HGB BLD-MCNC: 11 G/DL (ref 11.5–15.4)
IMM GRANULOCYTES # BLD AUTO: 0.02 THOUSAND/UL (ref 0–0.2)
IMM GRANULOCYTES NFR BLD AUTO: 0 % (ref 0–2)
LYMPHOCYTES # BLD AUTO: 2.53 THOUSANDS/ΜL (ref 0.6–4.47)
LYMPHOCYTES NFR BLD AUTO: 31 % (ref 14–44)
MCH RBC QN AUTO: 30.6 PG (ref 26.8–34.3)
MCHC RBC AUTO-ENTMCNC: 32 G/DL (ref 31.4–37.4)
MCV RBC AUTO: 96 FL (ref 82–98)
MONOCYTES # BLD AUTO: 0.71 THOUSAND/ΜL (ref 0.17–1.22)
MONOCYTES NFR BLD AUTO: 9 % (ref 4–12)
NEUTROPHILS # BLD AUTO: 4.65 THOUSANDS/ΜL (ref 1.85–7.62)
NEUTS SEG NFR BLD AUTO: 57 % (ref 43–75)
NRBC BLD AUTO-RTO: 0 /100 WBCS
PLATELET # BLD AUTO: 333 THOUSANDS/UL (ref 149–390)
PMV BLD AUTO: 9 FL (ref 8.9–12.7)
POTASSIUM SERPL-SCNC: 5.1 MMOL/L (ref 3.5–5.3)
PROT SERPL-MCNC: 7.3 G/DL (ref 6.4–8.2)
RBC # BLD AUTO: 3.59 MILLION/UL (ref 3.81–5.12)
SODIUM SERPL-SCNC: 138 MMOL/L (ref 136–145)
WBC # BLD AUTO: 8.14 THOUSAND/UL (ref 4.31–10.16)

## 2021-10-13 PROCEDURE — 36415 COLL VENOUS BLD VENIPUNCTURE: CPT | Performed by: INTERNAL MEDICINE

## 2021-10-13 PROCEDURE — 86140 C-REACTIVE PROTEIN: CPT | Performed by: INTERNAL MEDICINE

## 2021-10-13 PROCEDURE — 99215 OFFICE O/P EST HI 40 MIN: CPT | Performed by: INTERNAL MEDICINE

## 2021-10-13 PROCEDURE — 20610 DRAIN/INJ JOINT/BURSA W/O US: CPT | Performed by: INTERNAL MEDICINE

## 2021-10-13 PROCEDURE — 85025 COMPLETE CBC W/AUTO DIFF WBC: CPT | Performed by: INTERNAL MEDICINE

## 2021-10-13 PROCEDURE — 80053 COMPREHEN METABOLIC PANEL: CPT | Performed by: INTERNAL MEDICINE

## 2021-10-13 PROCEDURE — 85652 RBC SED RATE AUTOMATED: CPT | Performed by: INTERNAL MEDICINE

## 2021-10-13 RX ORDER — FOLIC ACID 1 MG/1
1 TABLET ORAL DAILY
Qty: 90 TABLET | Refills: 1 | Status: SHIPPED | OUTPATIENT
Start: 2021-10-13 | End: 2022-05-16 | Stop reason: SDUPTHER

## 2021-10-13 RX ORDER — TRIAMCINOLONE ACETONIDE 40 MG/ML
40 INJECTION, SUSPENSION INTRA-ARTICULAR; INTRAMUSCULAR ONCE
Status: COMPLETED | OUTPATIENT
Start: 2021-10-13 | End: 2021-10-13

## 2021-10-13 RX ORDER — DICLOFENAC SODIUM 75 MG/1
75 TABLET, DELAYED RELEASE ORAL 2 TIMES DAILY
Qty: 180 TABLET | Refills: 1 | Status: SHIPPED | OUTPATIENT
Start: 2021-10-13 | End: 2022-07-06 | Stop reason: ALTCHOICE

## 2021-10-13 RX ORDER — BUPIVACAINE HYDROCHLORIDE 2.5 MG/ML
1 INJECTION, SOLUTION EPIDURAL; INFILTRATION; INTRACAUDAL ONCE
Status: COMPLETED | OUTPATIENT
Start: 2021-10-13 | End: 2021-10-13

## 2021-10-13 RX ADMIN — TRIAMCINOLONE ACETONIDE 40 MG: 40 INJECTION, SUSPENSION INTRA-ARTICULAR; INTRAMUSCULAR at 16:52

## 2021-10-13 RX ADMIN — BUPIVACAINE HYDROCHLORIDE 1 ML: 2.5 INJECTION, SOLUTION EPIDURAL; INFILTRATION; INTRACAUDAL at 16:51

## 2021-10-19 ENCOUNTER — TELEPHONE (OUTPATIENT)
Dept: OBGYN CLINIC | Facility: HOSPITAL | Age: 51
End: 2021-10-19

## 2021-10-20 DIAGNOSIS — M05.79 RHEUMATOID ARTHRITIS INVOLVING MULTIPLE SITES WITH POSITIVE RHEUMATOID FACTOR (HCC): Primary | ICD-10-CM

## 2021-10-20 RX ORDER — HYDROXYCHLOROQUINE SULFATE 200 MG/1
200 TABLET, FILM COATED ORAL 2 TIMES DAILY
Qty: 180 TABLET | Refills: 1 | Status: SHIPPED | OUTPATIENT
Start: 2021-10-20 | End: 2022-07-06 | Stop reason: SDUPTHER

## 2021-10-27 ENCOUNTER — OFFICE VISIT (OUTPATIENT)
Dept: FAMILY MEDICINE CLINIC | Facility: CLINIC | Age: 51
End: 2021-10-27
Payer: COMMERCIAL

## 2021-10-27 VITALS
DIASTOLIC BLOOD PRESSURE: 86 MMHG | TEMPERATURE: 97.5 F | BODY MASS INDEX: 30.48 KG/M2 | OXYGEN SATURATION: 100 % | HEIGHT: 63 IN | WEIGHT: 172 LBS | SYSTOLIC BLOOD PRESSURE: 120 MMHG | HEART RATE: 89 BPM | RESPIRATION RATE: 14 BRPM

## 2021-10-27 DIAGNOSIS — R63.5 WEIGHT GAIN: Primary | ICD-10-CM

## 2021-10-27 PROCEDURE — 3008F BODY MASS INDEX DOCD: CPT | Performed by: FAMILY MEDICINE

## 2021-10-27 PROCEDURE — 1036F TOBACCO NON-USER: CPT | Performed by: FAMILY MEDICINE

## 2021-10-27 PROCEDURE — 99213 OFFICE O/P EST LOW 20 MIN: CPT | Performed by: FAMILY MEDICINE

## 2021-11-01 ENCOUNTER — HOSPITAL ENCOUNTER (OUTPATIENT)
Dept: RADIOLOGY | Facility: CLINIC | Age: 51
Discharge: HOME | End: 2021-11-01
Attending: ANESTHESIOLOGY | Admitting: ANESTHESIOLOGY
Payer: COMMERCIAL

## 2021-11-01 VITALS
TEMPERATURE: 98.6 F | HEART RATE: 90 BPM | SYSTOLIC BLOOD PRESSURE: 140 MMHG | OXYGEN SATURATION: 98 % | RESPIRATION RATE: 20 BRPM | DIASTOLIC BLOOD PRESSURE: 89 MMHG

## 2021-11-01 DIAGNOSIS — M54.16 LUMBAR RADICULOPATHY: ICD-10-CM

## 2021-11-01 PROCEDURE — 64483 NJX AA&/STRD TFRM EPI L/S 1: CPT | Performed by: ANESTHESIOLOGY

## 2021-11-01 PROCEDURE — 64484 NJX AA&/STRD TFRM EPI L/S EA: CPT | Performed by: ANESTHESIOLOGY

## 2021-11-01 RX ORDER — PAPAVERINE HCL 150 MG
20 CAPSULE, EXTENDED RELEASE ORAL ONCE
Status: COMPLETED | OUTPATIENT
Start: 2021-11-01 | End: 2021-11-01

## 2021-11-01 RX ADMIN — DEXAMETHASONE SODIUM PHOSPHATE 15 MG: 10 INJECTION, SOLUTION INTRAMUSCULAR; INTRAVENOUS at 13:35

## 2021-11-01 RX ADMIN — LIDOCAINE HYDROCHLORIDE 2 ML: 20 INJECTION, SOLUTION EPIDURAL; INFILTRATION; INTRACAUDAL; PERINEURAL at 13:35

## 2021-11-01 RX ADMIN — IOHEXOL 2 ML: 300 INJECTION, SOLUTION INTRAVENOUS at 13:35

## 2021-11-08 ENCOUNTER — TELEPHONE (OUTPATIENT)
Dept: PAIN MEDICINE | Facility: CLINIC | Age: 51
End: 2021-11-08

## 2021-11-08 DIAGNOSIS — M54.16 LUMBAR RADICULOPATHY: ICD-10-CM

## 2021-11-08 DIAGNOSIS — M51.26 LUMBAR DISC HERNIATION: Primary | ICD-10-CM

## 2021-11-18 ENCOUNTER — TELEPHONE (OUTPATIENT)
Dept: OBGYN CLINIC | Facility: HOSPITAL | Age: 51
End: 2021-11-18

## 2021-11-18 DIAGNOSIS — R63.5 WEIGHT GAIN: ICD-10-CM

## 2021-11-18 RX ORDER — PHENTERMINE HYDROCHLORIDE 30 MG/1
30 CAPSULE ORAL EVERY MORNING
Qty: 30 CAPSULE | Refills: 0 | Status: SHIPPED | OUTPATIENT
Start: 2021-11-18 | End: 2021-12-27 | Stop reason: SDUPTHER

## 2021-12-07 ENCOUNTER — TELEPHONE (OUTPATIENT)
Dept: PAIN MEDICINE | Facility: CLINIC | Age: 51
End: 2021-12-07

## 2021-12-10 ENCOUNTER — OFFICE VISIT (OUTPATIENT)
Dept: OBGYN CLINIC | Facility: CLINIC | Age: 51
End: 2021-12-10
Payer: COMMERCIAL

## 2021-12-10 ENCOUNTER — APPOINTMENT (OUTPATIENT)
Dept: RADIOLOGY | Facility: AMBULARY SURGERY CENTER | Age: 51
End: 2021-12-10
Attending: ORTHOPAEDIC SURGERY
Payer: COMMERCIAL

## 2021-12-10 VITALS — BODY MASS INDEX: 31.89 KG/M2 | WEIGHT: 180 LBS | HEIGHT: 63 IN

## 2021-12-10 DIAGNOSIS — M25.562 LEFT KNEE PAIN, UNSPECIFIED CHRONICITY: ICD-10-CM

## 2021-12-10 DIAGNOSIS — M25.561 RIGHT KNEE PAIN, UNSPECIFIED CHRONICITY: ICD-10-CM

## 2021-12-10 DIAGNOSIS — M17.12 PRIMARY OSTEOARTHRITIS OF LEFT KNEE: Primary | ICD-10-CM

## 2021-12-10 PROCEDURE — 99203 OFFICE O/P NEW LOW 30 MIN: CPT | Performed by: ORTHOPAEDIC SURGERY

## 2021-12-10 PROCEDURE — 73562 X-RAY EXAM OF KNEE 3: CPT

## 2021-12-13 ENCOUNTER — CONSULT (OUTPATIENT)
Dept: NEUROSURGERY | Facility: CLINIC | Age: 51
End: 2021-12-13
Payer: COMMERCIAL

## 2021-12-13 VITALS
TEMPERATURE: 97 F | WEIGHT: 180 LBS | DIASTOLIC BLOOD PRESSURE: 100 MMHG | HEIGHT: 63 IN | SYSTOLIC BLOOD PRESSURE: 140 MMHG | RESPIRATION RATE: 16 BRPM | HEART RATE: 80 BPM | BODY MASS INDEX: 31.89 KG/M2

## 2021-12-13 DIAGNOSIS — M54.16 LUMBAR RADICULOPATHY: ICD-10-CM

## 2021-12-13 DIAGNOSIS — M51.26 LUMBAR DISC HERNIATION: ICD-10-CM

## 2021-12-13 PROCEDURE — 1036F TOBACCO NON-USER: CPT | Performed by: STUDENT IN AN ORGANIZED HEALTH CARE EDUCATION/TRAINING PROGRAM

## 2021-12-13 PROCEDURE — 3008F BODY MASS INDEX DOCD: CPT | Performed by: STUDENT IN AN ORGANIZED HEALTH CARE EDUCATION/TRAINING PROGRAM

## 2021-12-13 PROCEDURE — 99203 OFFICE O/P NEW LOW 30 MIN: CPT | Performed by: STUDENT IN AN ORGANIZED HEALTH CARE EDUCATION/TRAINING PROGRAM

## 2021-12-27 DIAGNOSIS — R63.5 WEIGHT GAIN: ICD-10-CM

## 2021-12-27 RX ORDER — PHENTERMINE HYDROCHLORIDE 30 MG/1
30 CAPSULE ORAL EVERY MORNING
Qty: 30 CAPSULE | Refills: 0 | Status: SHIPPED | OUTPATIENT
Start: 2021-12-27 | End: 2022-01-28 | Stop reason: SDUPTHER

## 2022-01-21 ENCOUNTER — RA CDI HCC (OUTPATIENT)
Dept: OTHER | Facility: HOSPITAL | Age: 52
End: 2022-01-21

## 2022-01-21 NOTE — PROGRESS NOTES
[facility]  coding opportunities      [facility] 75  coding opportunities          Number of diagnosis code(s) already on the problem list added to FYI fla               Number of suggestions used: 1         Patients insurance company: [employer] (Medicare and [employer] for [employer] and [employer])     Visit status: Patient arrived for their scheduled appointment              Number of diagnosis code(s) already on the problem list added to FYI fla                     Patients insurance company: [employer] (Medicare and Commercial for [employer] and [employer])           Please review/recertify the following conditions for :    M05 79 Rheumatoid arthritis involving multiple sites with positive rheumatoid factor      If this is correct, please assess and document during your next visit,

## 2022-01-28 ENCOUNTER — OFFICE VISIT (OUTPATIENT)
Dept: FAMILY MEDICINE CLINIC | Facility: CLINIC | Age: 52
End: 2022-01-28
Payer: COMMERCIAL

## 2022-01-28 VITALS
DIASTOLIC BLOOD PRESSURE: 80 MMHG | BODY MASS INDEX: 33.01 KG/M2 | SYSTOLIC BLOOD PRESSURE: 124 MMHG | HEIGHT: 62 IN | WEIGHT: 179.4 LBS | OXYGEN SATURATION: 99 % | HEART RATE: 89 BPM | RESPIRATION RATE: 16 BRPM | TEMPERATURE: 97.9 F

## 2022-01-28 DIAGNOSIS — Z13.6 SCREENING FOR CARDIOVASCULAR CONDITION: ICD-10-CM

## 2022-01-28 DIAGNOSIS — R63.5 WEIGHT GAIN: ICD-10-CM

## 2022-01-28 DIAGNOSIS — R53.83 FATIGUE, UNSPECIFIED TYPE: ICD-10-CM

## 2022-01-28 DIAGNOSIS — M54.50 CHRONIC BILATERAL LOW BACK PAIN WITHOUT SCIATICA: ICD-10-CM

## 2022-01-28 DIAGNOSIS — Z13.1 SCREENING FOR DIABETES MELLITUS: ICD-10-CM

## 2022-01-28 DIAGNOSIS — F17.200 TOBACCO DEPENDENCE: ICD-10-CM

## 2022-01-28 DIAGNOSIS — E55.9 VITAMIN D DEFICIENCY: ICD-10-CM

## 2022-01-28 DIAGNOSIS — G89.29 CHRONIC BILATERAL LOW BACK PAIN WITHOUT SCIATICA: ICD-10-CM

## 2022-01-28 DIAGNOSIS — M51.26 LUMBAR DISC HERNIATION: ICD-10-CM

## 2022-01-28 DIAGNOSIS — Z00.00 ANNUAL PHYSICAL EXAM: Primary | ICD-10-CM

## 2022-01-28 DIAGNOSIS — M05.79 RHEUMATOID ARTHRITIS INVOLVING MULTIPLE SITES WITH POSITIVE RHEUMATOID FACTOR (HCC): ICD-10-CM

## 2022-01-28 DIAGNOSIS — M25.562 CHRONIC PAIN OF LEFT KNEE: ICD-10-CM

## 2022-01-28 DIAGNOSIS — Z12.31 ENCOUNTER FOR SCREENING MAMMOGRAM FOR BREAST CANCER: ICD-10-CM

## 2022-01-28 DIAGNOSIS — G89.29 CHRONIC PAIN OF LEFT KNEE: ICD-10-CM

## 2022-01-28 PROCEDURE — 99396 PREV VISIT EST AGE 40-64: CPT | Performed by: FAMILY MEDICINE

## 2022-01-28 PROCEDURE — 1036F TOBACCO NON-USER: CPT | Performed by: FAMILY MEDICINE

## 2022-01-28 PROCEDURE — 3725F SCREEN DEPRESSION PERFORMED: CPT | Performed by: FAMILY MEDICINE

## 2022-01-28 RX ORDER — PHENTERMINE HYDROCHLORIDE 30 MG/1
30 CAPSULE ORAL EVERY MORNING
Qty: 30 CAPSULE | Refills: 2 | Status: SHIPPED | OUTPATIENT
Start: 2022-01-28 | End: 2022-04-29 | Stop reason: SDUPTHER

## 2022-01-28 RX ORDER — TOPIRAMATE 25 MG/1
25 TABLET ORAL 2 TIMES DAILY
Qty: 60 TABLET | Refills: 5 | Status: SHIPPED | OUTPATIENT
Start: 2022-01-28 | End: 2022-01-28 | Stop reason: SDUPTHER

## 2022-01-28 RX ORDER — TOPIRAMATE 25 MG/1
25 TABLET ORAL 2 TIMES DAILY
Qty: 180 TABLET | Refills: 1 | Status: SHIPPED | OUTPATIENT
Start: 2022-01-28 | End: 2022-04-29 | Stop reason: ALTCHOICE

## 2022-01-28 NOTE — PATIENT INSTRUCTIONS

## 2022-01-28 NOTE — PROGRESS NOTES
[address]    NAME: Addi Chu  AGE: 47 y o  SEX: female  : 1970     DATE: 2022     Assessment and Plan:     Problem List Items Addressed This Visit        Musculoskeletal and Integument    Rheumatoid arthritis involving multiple sites with positive rheumatoid factor ([address] )    Lumbar disc herniation      Other Visit Diagnoses     Annual physical exam    -  Primary    Billie Robins is stable on exam   She is to continue to work on a lower carb diet, and getting regular, light exercise  FBW ordered  Encounter for screening mammogram for breast cancer        Mammogram ordered  Relevant Orders    Mammo screening bilateral w cad    Tobacco dependence        Pt still vapes - not smoking still  Pt urged to quit vaping  Chronic pain of left knee        Ongoing - working with Ortho  Chronic bilateral low back pain without sciatica        Ongoing - pt has seen Neurosurgery  Weight gain        Pt frustrated, as wt loss flattening  Diet and exercise as above  Adding Topamax to the Phentermine  [place]  Precautions given  Relevant Medications    topiramate (Topamax) 25 mg tablet    phentermine 30 MG capsule    Vitamin D deficiency        Hx of - on an OTC Vit D3 supplement  Check Vit D level with other labs ordered  Relevant Orders    Vitamin D 25 hydroxy    Screening for diabetes mellitus        Relevant Orders    Comprehensive metabolic panel    Screening for cardiovascular condition        Relevant Orders    Lipid Panel with Direct LDL reflex    Fatigue, unspecified type        Relevant Orders    CBC and differential    TSH, 3rd generation with Free T4 reflex          Immunizations and preventive care screenings were discussed with patient today  Appropriate education was printed on patient's after visit summary      Counseling:  Dental Health: discussed importance of regular tooth brushing, flossing, and dental visits  · Exercise: the importance of regular exercise/physical activity was discussed  Recommend exercise 3-5 times per week for at least 30 minutes  Depression Screening and Follow-up Plan: Patient was screened for depression during today's encounter  They screened negative with a PHQ-2 score of 0  Return in about 3 months (around 4/28/2022) for Recheck  Chief Complaint:     Chief Complaint   Patient presents with    Annual Exam     requesting note for COVID vaccine      History of Present Illness:     Adult Annual Physical   Patient here for a comprehensive physical exam  The patient reports no problems  [place]  Has been working with Rheumatology - on Methotrexate  Continues to work with Ortho for her left knee  Also, did see Neurosurgery for her back  Lizett Aguilar declines Flu Vaccine, and has continued to decline vaccination against COV-19  Pt has had, and cleared COV-19 twice, including having received [facility] Ab Therapy with the first occurrence  Pt did bring up that her work may soon require vaccination against COV-19; 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, 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 medical waiver for the vaccines -> I discussed at length with Lizett Aguilar, and explained that I would not sign off on the waiver  Pt does not have an allergy to the vaccines  I actually explained to her that with her hx of immunosuppression, and that despite having the virus twice (COV-19 continues to mutate), that she was a high priority patient to be vaccinated  I tried to answer all of the pt's questions to the best of my ability, and discussed with her the good safety profiles of the vaccines, their strong efficacy at limiting risk for serious illness/death, and the listed, very rare serious assoc risks of the vaccines  Pt will consider all of the above  Diet and Physical Activity  · Diet/Nutrition: well balanced diet  · Exercise: no formal exercise        Depression Screening  PHQ-2/9 Depression Screening    Little interest or pleasure in doing things: 0 - not at all  Feeling down, depressed, or hopeless: 0 - not at all  PHQ-2 Score: 0  PHQ-2 Interpretation: Negative depression screen       General Health  · Sleep: sleeps well  · Hearing: normal - bilateral   · Vision: no vision problems  · Dental: regular dental visits  /GYN Health  · Patient is: postmenopausal  · Last menstrual period: N/A     · Contraceptive method: None  Review of Systems:     Review of Systems   Constitutional: Negative for activity change  Respiratory: Negative for shortness of breath  Cardiovascular: Negative for chest pain  Gastrointestinal: Negative for abdominal pain and blood in stool  Genitourinary: Negative for vaginal bleeding  No new breast lumps on self-examination  Musculoskeletal: Positive for arthralgias and back pain  Psychiatric/Behavioral: Negative for dysphoric mood  The patient is nervous/anxious         Past Medical History:     Past Medical History:   Diagnosis Date    Anxiety     Depression     Knee pain     Pinched nerve     L5, L2    Rheumatoid arthritis ([address] )       Past Surgical History:     Past Surgical History:   Procedure Laterality Date    ROTATOR CUFF REPAIR        Social History:     Social History     Socioeconomic History    Marital status: Single     Spouse name: None    Number of children: None    Years of education: None    Highest education level: None   Occupational History    None   Tobacco Use    Smoking status: Former Smoker     Packs/day: 1 00     Years: 35 00     Pack years: 35 00    Smokeless tobacco: Current User    Tobacco comment: occasional vaping use   Vaping Use    Vaping Use: Every day    Substances: Nicotine, Flavoring   Substance and Sexual Activity    Alcohol use: Yes     Comment: occassionally    Drug use: Never    Sexual activity: Yes   Other Topics Concern    None   Social History Narrative    None     Social Determinants of Health     Financial Resource Strain: Not on file   Food Insecurity: Not on file   Transportation Needs: Not on file   Physical Activity: Not on file   Stress: Not on file   Social Connections: Not on file   Intimate Partner Violence: Not on file   Housing Stability: Not on file      Family History:     History reviewed  No pertinent family history  Current Medications:     Current Outpatient Medications   Medication Sig Dispense Refill    Ascorbic Acid (vitamin C) 1000 MG tablet Take 1,000 mg by mouth daily      cholecalciferol (VITAMIN D3) 1,000 units tablet Take 2,000 Units by mouth      diclofenac (VOLTAREN) 75 mg EC tablet Take 1 tablet (75 mg total) by mouth 2 (two) times a day Can take as needed for joint pain 107 tablet 1    folic acid (FOLVITE) 1 mg tablet Take 1 tablet (1 mg total) by mouth daily 90 tablet 1    hydroxychloroquine (PLAQUENIL) 200 mg tablet Take 1 tablet (200 mg total) by mouth 2 (two) times a day 180 tablet 1    magnesium gluconate (MAGONATE) 500 mg tablet Take 500 mg by mouth daily      methotrexate 2 5 mg tablet 8 tablet po weekly (take all 8 tablets on the same day every week) 96 tablet 1    multivitamin (THERAGRAN) TABS Take 1 tablet by mouth daily      phentermine 30 MG capsule Take 1 capsule (30 mg total) by mouth every morning 30 capsule 2    pyridoxine (B-6) 100 MG tablet Take 100 mg by mouth      vitamin B-12 (VITAMIN B-12) 1,000 mcg tablet Take by mouth daily      zinc gluconate 50 mg tablet Take 50 mg by mouth daily      Acetaminophen (TYLENOL ARTHRITIS PAIN PO) Take by mouth (Patient not taking: Reported on 1/28/2022 )      Diclofenac Sodium (VOLTAREN) 1 % Apply 2 g topically 4 (four) times a day (Patient not taking: Reported on 1/28/2022 ) 300 g 3    topiramate (Topamax) 25 mg tablet Take 1 tablet (25 mg total) by mouth 2 (two) times a day 180 tablet 1     No current facility-administered medications for this visit  Allergies:      Allergies Allergen Reactions    Clarithromycin GI Intolerance    Seasonal Ic [Cholestatin] Other (See Comments)     stuffiness      Physical Exam:     /80 (BP Location: Left arm, Patient Position: Sitting, Cuff Size: Large)   Pulse 89   Temp 97 9 °F (36 6 °C) (Temporal)   Resp 16   Ht 5' 1 58" (1 564 m)   Wt 81 4 kg (179 lb 6 4 oz)   SpO2 99%   BMI 33 27 kg/m²     Physical Exam  Vitals and nursing note reviewed  Constitutional:       General: She is not in acute distress  Appearance: Normal appearance  She is not ill-appearing, toxic-appearing or diaphoretic  HENT:      Head: Normocephalic and atraumatic  Eyes:      General: No scleral icterus  Conjunctiva/sclera: Conjunctivae normal    Cardiovascular:      Rate and Rhythm: Normal rate and regular rhythm  Heart sounds: Normal heart sounds  No murmur heard  No friction rub  No gallop  Pulmonary:      Effort: Pulmonary effort is normal  No respiratory distress  Breath sounds: Normal breath sounds  No stridor  No wheezing, rhonchi or rales  Abdominal:      General: Abdomen is flat  Bowel sounds are normal  There is no distension  Palpations: Abdomen is soft  There is no mass  Tenderness: There is no abdominal tenderness  There is no guarding or rebound  Musculoskeletal:      Cervical back: Normal range of motion and neck supple  No rigidity or tenderness  Lymphadenopathy:      Cervical: No cervical adenopathy  Neurological:      Mental Status: She is alert and oriented to person, place, and time  Psychiatric:         Mood and Affect: Mood normal          Behavior: Behavior normal          Thought Content: Thought content normal          Judgment: Judgment normal      Kassy Nolasco was seen today for annual exam     Diagnoses and all orders for this visit:    Annual physical exam  Comments:  Kassy Nolasco is stable on exam   She is to continue to work on a lower carb diet, and getting regular, light exercise    FBW ordered  Encounter for screening mammogram for breast cancer  Comments:  Mammogram ordered  Orders:  -     Mammo screening bilateral w cad; Future    Tobacco dependence  Comments:  Pt still vapes - not smoking still  Pt urged to quit vaping  Rheumatoid arthritis involving multiple sites with positive rheumatoid factor ([facility] [address] )  Comments:  Ongoing - stable; followed by Rheumatology  On Methotrexate  Again, pt was urged to considered vaccinating against COV-19  Chronic pain of left knee  Comments:  Ongoing - working with Ortho  Chronic bilateral low back pain without sciatica  Comments:  Ongoing - pt has seen Neurosurgery  Lumbar disc herniation  Comments:  As above  Weight gain  Comments:  Pt frustrated, as wt loss flattening  Diet and exercise as above  Adding Topamax to the Phentermine  [place]  Precautions given  Orders:  -     Discontinue: topiramate (Topamax) 25 mg tablet; Take 1 tablet (25 mg total) by mouth 2 (two) times a day  -     topiramate (Topamax) 25 mg tablet; Take 1 tablet (25 mg total) by mouth 2 (two) times a day  -     phentermine 30 MG capsule; Take 1 capsule (30 mg total) by mouth every morning    Vitamin D deficiency  Comments:  Hx of - on an OTC Vit D3 supplement  Check Vit D level with other labs ordered  Orders:  -     Vitamin D 25 hydroxy; Future    Screening for diabetes mellitus  -     Comprehensive metabolic panel; Future    Screening for cardiovascular condition  -     Lipid Panel with Direct LDL reflex; Future    Fatigue, unspecified type  -     CBC and differential; Future  -     TSH, 3rd generation with Free T4 reflex;  Future           [address] Yennifer Garcia, DO  [address] [facility]

## 2022-02-03 ENCOUNTER — TELEPHONE (OUTPATIENT)
Dept: OBGYN CLINIC | Facility: HOSPITAL | Age: 52
End: 2022-02-03

## 2022-02-03 ENCOUNTER — PROCEDURE VISIT (OUTPATIENT)
Dept: OBGYN CLINIC | Facility: CLINIC | Age: 52
End: 2022-02-03
Payer: COMMERCIAL

## 2022-02-03 VITALS
BODY MASS INDEX: 32.76 KG/M2 | WEIGHT: 178 LBS | HEART RATE: 81 BPM | SYSTOLIC BLOOD PRESSURE: 155 MMHG | HEIGHT: 62 IN | DIASTOLIC BLOOD PRESSURE: 77 MMHG

## 2022-02-03 DIAGNOSIS — M17.12 PRIMARY OSTEOARTHRITIS OF LEFT KNEE: Primary | ICD-10-CM

## 2022-02-03 DIAGNOSIS — M05.79 RHEUMATOID ARTHRITIS INVOLVING MULTIPLE SITES WITH POSITIVE RHEUMATOID FACTOR (HCC): Primary | ICD-10-CM

## 2022-02-03 PROCEDURE — 20610 DRAIN/INJ JOINT/BURSA W/O US: CPT | Performed by: ORTHOPAEDIC SURGERY

## 2022-02-03 PROCEDURE — 99212 OFFICE O/P EST SF 10 MIN: CPT | Performed by: ORTHOPAEDIC SURGERY

## 2022-02-03 RX ORDER — HYALURONATE SODIUM 10 MG/ML
20 SYRINGE (ML) INTRAARTICULAR
Status: COMPLETED | OUTPATIENT
Start: 2022-02-03 | End: 2022-02-03

## 2022-02-03 RX ADMIN — Medication 20 MG: at 07:57

## 2022-02-03 NOTE — PROGRESS NOTES
1  Primary osteoarthritis of left knee  Large joint arthrocentesis     Patient is here for her 1st injection of Euflexxa into the left knee  Patient reports knee pain stiffness, recent RA flare up  Physical exam of the knee shows no effusion no ecchymosis  All other organ systems normal    Large joint arthrocentesis: L knee  Universal Protocol:  Consent given by: patient  Time out: Immediately prior to procedure a "time out" was called to verify the correct patient, procedure, equipment, support staff and site/side marked as required    Site marked: the operative site was marked  Supporting Documentation  Indications: pain   Procedure Details  Location: knee - L knee  Preparation: Patient was prepped and draped in the usual sterile fashion  Needle size: 22 G  Ultrasound guidance: no  Approach: anterolateral  Medications administered: 20 mg Sodium Hyaluronate 20 MG/2ML  Specialty Pharmacy Supplied: received medications from pharmacy  Patient tolerance: patient tolerated the procedure well with no immediate complications  Dressing:  Sterile dressing applied          Patient tolerated procedure follow up 1 week

## 2022-02-03 NOTE — TELEPHONE ENCOUNTER
Patient is requesting a script for bloodwork from Dr Mari Jarrett    She has an upcoming on 2/16/2022

## 2022-02-10 ENCOUNTER — PROCEDURE VISIT (OUTPATIENT)
Dept: OBGYN CLINIC | Facility: CLINIC | Age: 52
End: 2022-02-10
Payer: COMMERCIAL

## 2022-02-10 ENCOUNTER — LAB (OUTPATIENT)
Dept: LAB | Facility: CLINIC | Age: 52
End: 2022-02-10
Payer: COMMERCIAL

## 2022-02-10 VITALS — HEIGHT: 62 IN | WEIGHT: 178 LBS | BODY MASS INDEX: 32.76 KG/M2

## 2022-02-10 DIAGNOSIS — E55.9 VITAMIN D DEFICIENCY: ICD-10-CM

## 2022-02-10 DIAGNOSIS — Z13.6 SCREENING FOR CARDIOVASCULAR CONDITION: ICD-10-CM

## 2022-02-10 DIAGNOSIS — Z13.1 SCREENING FOR DIABETES MELLITUS: ICD-10-CM

## 2022-02-10 DIAGNOSIS — M17.12 PRIMARY OSTEOARTHRITIS OF LEFT KNEE: Primary | ICD-10-CM

## 2022-02-10 DIAGNOSIS — R53.83 FATIGUE, UNSPECIFIED TYPE: ICD-10-CM

## 2022-02-10 LAB
25(OH)D3 SERPL-MCNC: 72.8 NG/ML (ref 30–100)
ALBUMIN SERPL BCP-MCNC: 3.8 G/DL (ref 3.5–5)
ALP SERPL-CCNC: 62 U/L (ref 46–116)
ALT SERPL W P-5'-P-CCNC: 55 U/L (ref 12–78)
ANION GAP SERPL CALCULATED.3IONS-SCNC: 9 MMOL/L (ref 4–13)
AST SERPL W P-5'-P-CCNC: 22 U/L (ref 5–45)
BASOPHILS # BLD AUTO: 0.04 THOUSANDS/ΜL (ref 0–0.1)
BASOPHILS NFR BLD AUTO: 1 % (ref 0–1)
BILIRUB SERPL-MCNC: 0.27 MG/DL (ref 0.2–1)
BUN SERPL-MCNC: 17 MG/DL (ref 5–25)
CALCIUM SERPL-MCNC: 9 MG/DL (ref 8.3–10.1)
CHLORIDE SERPL-SCNC: 103 MMOL/L (ref 100–108)
CHOLEST SERPL-MCNC: 236 MG/DL
CO2 SERPL-SCNC: 27 MMOL/L (ref 21–32)
CREAT SERPL-MCNC: 0.75 MG/DL (ref 0.6–1.3)
CRP SERPL QL: <3 MG/L
EOSINOPHIL # BLD AUTO: 0.16 THOUSAND/ΜL (ref 0–0.61)
EOSINOPHIL NFR BLD AUTO: 3 % (ref 0–6)
ERYTHROCYTE [DISTWIDTH] IN BLOOD BY AUTOMATED COUNT: 13 % (ref 11.6–15.1)
ERYTHROCYTE [SEDIMENTATION RATE] IN BLOOD: 6 MM/HOUR (ref 0–29)
GFR SERPL CREATININE-BSD FRML MDRD: 91 ML/MIN/1.73SQ M
GLUCOSE P FAST SERPL-MCNC: 82 MG/DL (ref 65–99)
HCT VFR BLD AUTO: 39.2 % (ref 34.8–46.1)
HDLC SERPL-MCNC: 64 MG/DL
HGB BLD-MCNC: 12.7 G/DL (ref 11.5–15.4)
IMM GRANULOCYTES # BLD AUTO: 0.01 THOUSAND/UL (ref 0–0.2)
IMM GRANULOCYTES NFR BLD AUTO: 0 % (ref 0–2)
LDLC SERPL CALC-MCNC: 144 MG/DL (ref 0–100)
LYMPHOCYTES # BLD AUTO: 1.88 THOUSANDS/ΜL (ref 0.6–4.47)
LYMPHOCYTES NFR BLD AUTO: 31 % (ref 14–44)
MCH RBC QN AUTO: 30.3 PG (ref 26.8–34.3)
MCHC RBC AUTO-ENTMCNC: 32.4 G/DL (ref 31.4–37.4)
MCV RBC AUTO: 94 FL (ref 82–98)
MONOCYTES # BLD AUTO: 0.61 THOUSAND/ΜL (ref 0.17–1.22)
MONOCYTES NFR BLD AUTO: 10 % (ref 4–12)
NEUTROPHILS # BLD AUTO: 3.42 THOUSANDS/ΜL (ref 1.85–7.62)
NEUTS SEG NFR BLD AUTO: 55 % (ref 43–75)
NRBC BLD AUTO-RTO: 0 /100 WBCS
PLATELET # BLD AUTO: 351 THOUSANDS/UL (ref 149–390)
PMV BLD AUTO: 8.8 FL (ref 8.9–12.7)
POTASSIUM SERPL-SCNC: 4.1 MMOL/L (ref 3.5–5.3)
PROT SERPL-MCNC: 7.4 G/DL (ref 6.4–8.2)
RBC # BLD AUTO: 4.19 MILLION/UL (ref 3.81–5.12)
SODIUM SERPL-SCNC: 139 MMOL/L (ref 136–145)
TRIGL SERPL-MCNC: 140 MG/DL
TSH SERPL DL<=0.05 MIU/L-ACNC: 2.16 UIU/ML (ref 0.36–3.74)
WBC # BLD AUTO: 6.12 THOUSAND/UL (ref 4.31–10.16)

## 2022-02-10 PROCEDURE — 20610 DRAIN/INJ JOINT/BURSA W/O US: CPT | Performed by: PHYSICIAN ASSISTANT

## 2022-02-10 PROCEDURE — 80053 COMPREHEN METABOLIC PANEL: CPT | Performed by: INTERNAL MEDICINE

## 2022-02-10 PROCEDURE — 36415 COLL VENOUS BLD VENIPUNCTURE: CPT | Performed by: INTERNAL MEDICINE

## 2022-02-10 PROCEDURE — 82306 VITAMIN D 25 HYDROXY: CPT

## 2022-02-10 PROCEDURE — 86140 C-REACTIVE PROTEIN: CPT | Performed by: INTERNAL MEDICINE

## 2022-02-10 PROCEDURE — 85025 COMPLETE CBC W/AUTO DIFF WBC: CPT | Performed by: INTERNAL MEDICINE

## 2022-02-10 PROCEDURE — 85652 RBC SED RATE AUTOMATED: CPT | Performed by: INTERNAL MEDICINE

## 2022-02-10 PROCEDURE — 80061 LIPID PANEL: CPT

## 2022-02-10 PROCEDURE — 84443 ASSAY THYROID STIM HORMONE: CPT

## 2022-02-10 RX ORDER — HYALURONATE SODIUM 10 MG/ML
20 SYRINGE (ML) INTRAARTICULAR
Status: COMPLETED | OUTPATIENT
Start: 2022-02-10 | End: 2022-02-10

## 2022-02-10 RX ADMIN — Medication 20 MG: at 07:41

## 2022-02-10 NOTE — PROGRESS NOTES
1  Primary osteoarthritis of left knee  Large joint arthrocentesis     Patient is here for her 2nd injection of Euflexxa into the left knee  Patient reports improvements  Physical exam of the knee shows no effusion no ecchymosis  All other organ systems normal    Large joint arthrocentesis: L knee  Universal Protocol:  Consent given by: patient  Time out: Immediately prior to procedure a "time out" was called to verify the correct patient, procedure, equipment, support staff and site/side marked as required    Site marked: the operative site was marked  Supporting Documentation  Indications: pain   Procedure Details  Location: knee - L knee  Preparation: Patient was prepped and draped in the usual sterile fashion  Needle size: 22 G  Ultrasound guidance: no  Approach: anterolateral  Medications administered: 20 mg Sodium Hyaluronate 20 MG/2ML    Patient tolerance: patient tolerated the procedure well with no immediate complications  Dressing:  Sterile dressing applied          Patient tolerated procedure follow up 1 week

## 2022-02-12 NOTE — RESULT ENCOUNTER NOTE
Please let the patient know that their thyroid and Vitamin D levels were normal   Her cholesterol was up some, with her LDL at 144  She is to work on less red meat / fried food / fast food / butter / [employer] and carbs in their diet, and getting back to regular exercise  They are to f/u as planned  Thanks    Mirta

## 2022-02-16 ENCOUNTER — OFFICE VISIT (OUTPATIENT)
Dept: RHEUMATOLOGY | Facility: CLINIC | Age: 52
End: 2022-02-16
Payer: COMMERCIAL

## 2022-02-16 VITALS
SYSTOLIC BLOOD PRESSURE: 144 MMHG | HEIGHT: 62 IN | BODY MASS INDEX: 32.76 KG/M2 | WEIGHT: 178 LBS | DIASTOLIC BLOOD PRESSURE: 80 MMHG

## 2022-02-16 DIAGNOSIS — Z79.899 HIGH RISK MEDICATION USE: ICD-10-CM

## 2022-02-16 DIAGNOSIS — M17.12 OSTEOARTHRITIS OF LEFT KNEE, UNSPECIFIED OSTEOARTHRITIS TYPE: ICD-10-CM

## 2022-02-16 DIAGNOSIS — M05.79 RHEUMATOID ARTHRITIS INVOLVING MULTIPLE SITES WITH POSITIVE RHEUMATOID FACTOR (HCC): Primary | ICD-10-CM

## 2022-02-16 DIAGNOSIS — Z79.899 METHOTREXATE, LONG TERM, CURRENT USE: ICD-10-CM

## 2022-02-16 PROCEDURE — 99215 OFFICE O/P EST HI 40 MIN: CPT | Performed by: INTERNAL MEDICINE

## 2022-02-16 PROCEDURE — 1036F TOBACCO NON-USER: CPT | Performed by: INTERNAL MEDICINE

## 2022-02-16 NOTE — PROGRESS NOTES
Assessment and Plan:   47 y o  female presenting for seropositive RA follow up  Patient stated that her joint pain and morning stiffness are getting worse  Discussed with patient the benefits and risks of biological agents (anti-TNF inhibitors)  Instruction for injection  Patient is going to have her covid-19 vaccine  Continue diclofenac twice a day as needed for joint pain  Continue diclofenac gel as needed for joint pain  Continue folic acid daily  Continue methotrexate 8 tabs once a week; hold one methotrexate dose after COVID vaccine  Can take Vit  D daily  Stop hydroxychloroquine since not helping  Will work on prior auth for Humira every other week injection  Do labs before next visit  fml[facility] paperwork filled out    Plan:  Diagnoses and all orders for this visit:    Rheumatoid arthritis involving multiple sites with positive rheumatoid factor (HCC)  -     CBC and differential  -     Comprehensive metabolic panel  -     C-reactive protein  -     Sedimentation rate, automated    Osteoarthritis of left knee, unspecified osteoarthritis type    Methotrexate, long term, current use    High risk medication use  -     CBC and differential  -     Comprehensive metabolic panel     High risk medication use - Benefits and risks of hydroxychloroquine, including but not limited to retinal toxicity, corneal deposits, gastrointestinal side effects, and headaches were previously discussed with the patient  She is aware of the need for a regular eye exam to monitor for ocular toxicity while on this medication  Benefits and risks of methotrexate use, including but not limited to gastrointestinal disturbances such as diarrhea, hair loss, fatigue, stomatitis, leukopenia, lung hypersensitivity reaction, hepatotoxicity, and lymphoma were previously discussed with the patient  Baseline viral hepatitis panel was ordered and returned negative   CBC,CMP will be regularly monitored   Patient does not plan on having any children  Patient was prescribed Folic Acid 1 mg po daily to take while on methotrexate to help prevent side effects  Patient counseled on abstinence from alcohol while using methotrexate      Benefits and risks of TNF inhibitors such as Humira discussed, and include but are not limited to leukopenia, reactivation of hepatitis B or TB, lymphoma, infusion or site reactions, exacerbation of heart failure, and increased risk of infections  A baseline CBC, CMP, Hepatitis B/C status, a CXR, and TB test will be checked  Patient will need CBC, CMP every 2 to 4 months and a CBC with infection  Follow-up plan: RTC in 4 months        Rheumatic Disease Summary:  Terry Raymundo a 47 y  o  female who originally presented on 5/13/20 via telemedicine to establish care for her newly diagnosed seropositive (RF+, anti-CCP+) Rheumatoid arthritis   Patient had migratory early RA symptoms, which significantly improved with prednisone   Asked her to continue her prednisone course prescribed by PCP    Initiated her on DMARD therapy with weight-based hydroxychloroquine 200mg po bid; asked patient to get regular eye exams while on this medication to monitor for Plaquenil-related retinal toxicity   Ophthalmology referral made per patient's request  Yogi Caputo chose this more benign DMARD therapy over starting methotrexate during this time in the pandemic   Ordered hand x-rays for patient to do when she gets the chance to have her baseline   Asked patient to call or message clinic with any RA flare symptoms        She presented for follow-up on 08/19/2020  At that time, her RA had not come under control with three months of hydroxychloroquine alone, so added on methotrexate 15mg po weekly with daily folic acid  Bilateral hand x-rays returned unremarkable  Since naproxen and meloxicam had not helped patient's breakthrough joint pain in the past, prescribed diclofenac 75mg po bid prn instead   Kenalog 80mg IM steroid injection administered in clinic to treat her active RA symptoms, which had been affecting her ability to work; filled out [employer] paperwork in clinic (patient works as a prison cook)     She then presented on 11/24/2020 to Dr Carolina Gu for follow-up  Was having flare-ups with her right knee, and was put on prednisone p r n  at the time  No other medication changes were made      2/3/21:  Patient's rheumatoid arthritis was not coming under control with continued methotrexate 15 mg p o  weekly and hydroxychloroquine 200 mg p o  b i d  She still needed to take prednisone 10mg p r n  for knee flare ups  Decided to increase patient's methotrexate to 20 mg PO weekly with daily folic acid  She was also to continue hydroxychloroquine 200 mg p o  b i d , and would like to continue diclofenac 75 mg p o  b i d  Renewed her prednisone 10 mg p o  daily as needed while awaiting for the higher dose of methotrexate to take effect  In 2 months, planned to discuss whether advance in therapy to triple therapy with addition of sulfasalazine, or addition of a biologic agent is needed at the time  Asked patient to bring up her eye floaters issue to her eye doctor; she had a normal baseline eye exam before starting hydroxychloroquine  4/14/21: Patient mainly complains of left knee pain and swelling lately, worse when she is on her legs  Knee x-rays ordered reveal severe OA of left knee  Left knee steroid injection given in clinic, which patient tolerated well  Routine monitoring labs ordered and completed  Will continue patient on same medications, since her main symptomatic joint is likely due to her severe OA in left knee rather than RA, which was addressed to today with steroid injection  Continue methotrexate 20mg po weekly with daily folic acid and hydroxychloroquine 200mg po bid; patient is aware to get regular eye exams  Emphasized to patient that she can take the diclofenac as needed instead of regularly   For her left calf numbness, prescribed gabapentin 100mg po tid prn     7/14/21:Symptoms much improved after adding methotrexate, and performing L knee steroid injection at last visit, which was completed again today since joint pain and swelling symptoms just started returning a few days ago  Increased gabapentin to 300 mg po TID for left leg numbness; will be seeing pain management shortly to address her lumbosacral radiculopathy causing the left leg numbness  Can also use diclofenac gel as needed for painful joints  She is to continue hydroxychloroquine 200 mg p o  b i d , methotrexate 20 mg p o  weekly with folic acid 1 mg p o  daily, and diclofenac 75 mg p o  b i d  as needed for joint pain  Ordered routine monitoring labs; CRP has significantly improved  10/13/21: Recommended physical therapy for likely left knee OA as well as referral to orthopedics for evaluation for hyaluronic acid gel injections  Symptoms much improved after performing L knee steroid injection at visit prior, which was completed again this visit since joint pain began worsening recently  She was to continue methotrexate 20 mg p o  weekly with folic acid 1 mg p o  daily, and diclofenac 75 mg p o  b i d  and diclofenac gel as needed for joint pain  Discontinued hydroxychloroquine as this was not helping her symptoms previously and the methotrexate was likely what was controlling her symptoms  Discontinue gabapentin given patient's side effects to this medication  She was to continue daily Vit  D  HPI   Camilo Sparks is a 47 y o   female who presents for RA follow up  Last clinic visit was 10/13/21  Patient stated she has flares  Her joints including toes, ankles, knees, hips, back, elbows  And hands are in pain  She is taking methotrexate 20 mg p o  weekly with folic acid 1 mg p o  daily, and diclofenac 75 mg p o  b i d and hydroxychloroquine  Patient says she has hand stiffness for hours and gets stiffness if she sits too long   She thinks her symptoms is getting worse  Patient does not have heater in her home  Cold makes her hand stiffness worse  She is undergoing left knee injection, she will have L knee injection tomorrow  She also complains of floats in her eyes, patient will have her ophthalmology appointment in June  Patient has recent labs  Cbc, CMP, CRP and ESR were unremarkable  Vitamin D 25 is normalized  Patient is going to have her first COVID-19 vaccine today  The following portions of the patient's history were reviewed and updated as appropriate: allergies, current medications, past family history, past medical history, past social history, past surgical history and problem list     Review of Systems:   Review of Systems   Constitutional: Negative for chills and fever  HENT: Negative for congestion, rhinorrhea, sneezing and sore throat  Eyes: Positive for pain  Negative for discharge  Respiratory: Negative for cough, chest tightness, shortness of breath and wheezing  Cardiovascular: Negative for chest pain and leg swelling  Gastrointestinal: Negative for abdominal pain, nausea and vomiting  Endocrine: Negative for polydipsia, polyphagia and polyuria  Genitourinary: Negative for flank pain, frequency and urgency  Musculoskeletal: Positive for arthralgias, back pain and myalgias  Negative for joint swelling  Skin: Negative for color change and pallor  Neurological: Positive for numbness  Negative for dizziness, weakness, light-headedness and headaches  Psychiatric/Behavioral: Negative for agitation and confusion         Home Medications:     Current Outpatient Medications:     Acetaminophen (TYLENOL ARTHRITIS PAIN PO), Take by mouth  , Disp: , Rfl:     Ascorbic Acid (vitamin C) 1000 MG tablet, Take 1,000 mg by mouth daily, Disp: , Rfl:     cholecalciferol (VITAMIN D3) 1,000 units tablet, Take 2,000 Units by mouth, Disp: , Rfl:     diclofenac (VOLTAREN) 75 mg EC tablet, Take 1 tablet (75 mg total) by mouth 2 (two) times a day Can take as needed for joint pain, Disp: 180 tablet, Rfl: 1    folic acid (FOLVITE) 1 mg tablet, Take 1 tablet (1 mg total) by mouth daily, Disp: 90 tablet, Rfl: 1    hydroxychloroquine (PLAQUENIL) 200 mg tablet, Take 1 tablet (200 mg total) by mouth 2 (two) times a day, Disp: 180 tablet, Rfl: 1    magnesium gluconate (MAGONATE) 500 mg tablet, Take 500 mg by mouth daily, Disp: , Rfl:     methotrexate 2 5 mg tablet, 8 tablet po weekly (take all 8 tablets on the same day every week), Disp: 96 tablet, Rfl: 1    multivitamin (THERAGRAN) TABS, Take 1 tablet by mouth daily, Disp: , Rfl:     phentermine 30 MG capsule, Take 1 capsule (30 mg total) by mouth every morning, Disp: 30 capsule, Rfl: 2    pyridoxine (B-6) 100 MG tablet, Take 100 mg by mouth, Disp: , Rfl:     vitamin B-12 (VITAMIN B-12) 1,000 mcg tablet, Take by mouth daily, Disp: , Rfl:     zinc gluconate 50 mg tablet, Take 50 mg by mouth daily, Disp: , Rfl:     Diclofenac Sodium (VOLTAREN) 1 %, Apply 2 g topically 4 (four) times a day (Patient not taking: Reported on 1/28/2022 ), Disp: 300 g, Rfl: 3    topiramate (Topamax) 25 mg tablet, Take 1 tablet (25 mg total) by mouth 2 (two) times a day, Disp: 180 tablet, Rfl: 1    Objective:    Vitals:    02/16/22 1104   BP: 144/80   Weight: 80 7 kg (178 lb)   Height: 5' 1 5" (1 562 m)       Physical Exam  Constitutional:       General: She is not in acute distress  Appearance: She is well-developed  She is not diaphoretic  HENT:      Head: Normocephalic  Mouth/Throat:      Pharynx: No oropharyngeal exudate  Eyes:      Pupils: Pupils are equal, round, and reactive to light  Cardiovascular:      Rate and Rhythm: Normal rate and regular rhythm  Heart sounds: No murmur heard  Pulmonary:      Effort: Pulmonary effort is normal  No respiratory distress  Breath sounds: No wheezing or rales  Abdominal:      General: Bowel sounds are normal       Palpations: Abdomen is soft  Tenderness: There is no abdominal tenderness  Musculoskeletal:         General: Tenderness (left medial knee, bilateral first MCP, left medial elbow, lumbar spine) present  Normal range of motion  Cervical back: Normal range of motion  Skin:     General: Skin is warm  Neurological:      Mental Status: She is alert and oriented to person, place, and time  Reviewed labs and imaging  Imaging:   XR bilateral knee 12/10/21  Right; Mild degenerative changes  Left: Moderate degenerative changes    MRI lumbar spine w wo contrast 6/30/2021  Impression: Central and left paramedian protrusion type disc herniation at L5-S1 contacts and potentially impacts the left S1 nerve root  Correlate for left S1 radiculopathy  Mild degenerative change results were described  Bilateral Knee x-rays 4/14/21  Right: unremarkable  Left: Tricompartmental osteoarthritic degenerative changes of the left knee with severe medial compartment joint space narrowing      Bilateral Hand x-rays 8/20/20: unremarkable    Labs:   Lab on 02/10/2022   Component Date Value Ref Range Status    Cholesterol 02/10/2022 236* See Comment mg/dL Final    Cholesterol:         Pediatric <18 Years        Desirable          <170 mg/dL      Borderline High    170-199 mg/dL      High               >=200 mg/dL        Adult >=18 Years            Desirable         <200 mg/dL      Borderline High   200-239 mg/dL      High              >239 mg/dL      Triglycerides 02/10/2022 140  See Comment mg/dL Final    Triglyceride:     0-9Y            <75mg/dL     10Y-17Y         <90 mg/dL       >=18Y     Normal          <150 mg/dL     Borderline High 150-199 mg/dL     High            200-499 mg/dL        Very High       >499 mg/dL    Specimen collection should occur prior to N-Acetylcysteine or Metamizole administration due to the potential for falsely depressed results      HDL, Direct 02/10/2022 64  >=50 mg/dL Final    Specimen collection should occur prior to Metamizole administration due to the potential for falsley depressed results   LDL Calculated 02/10/2022 144* 0 - 100 mg/dL Final    LDL Cholesterol:     Optimal           <100 mg/dl     Near Optimal      100-129 mg/dl     Above Optimal       Borderline High 130-159 mg/dl       High            160-189 mg/dl       Very High       >189 mg/dl         This screening LDL is a calculated result  It does not have the accuracy of the Direct Measured LDL in the monitoring of patients with hyperlipidemia and/or statin therapy  Direct Measure LDL (CXM103) must be ordered separately in these patients   TSH 3RD GENERATON 02/10/2022 2  156  0 358 - 3 740 uIU/mL Final    The recommended reference ranges for TSH during pregnancy are as follows:   First trimester 0 1 to 2 5 uIU/mL   Second trimester  0 2 to 3 0 uIU/mL   Third trimester 0 3 to 3 0 uIU/m    Note: Normal ranges may not apply to patients who are transgender, non-binary, or whose legal sex, sex at birth, and gender identity differ      Vit D, 25-Hydroxy 02/10/2022 72 8  30 0 - 100 0 ng/mL Final   Orders Only on 02/03/2022   Component Date Value Ref Range Status    WBC 02/10/2022 6 12  4 31 - 10 16 Thousand/uL Final    RBC 02/10/2022 4 19  3 81 - 5 12 Million/uL Final    Hemoglobin 02/10/2022 12 7  11 5 - 15 4 g/dL Final    Hematocrit 02/10/2022 39 2  34 8 - 46 1 % Final    MCV 02/10/2022 94  82 - 98 fL Final    MCH 02/10/2022 30 3  26 8 - 34 3 pg Final    MCHC 02/10/2022 32 4  31 4 - 37 4 g/dL Final    RDW 02/10/2022 13 0  11 6 - 15 1 % Final    MPV 02/10/2022 8 8* 8 9 - 12 7 fL Final    Platelets 70/63/7481 351  149 - 390 Thousands/uL Final    nRBC 02/10/2022 0  /100 WBCs Final    Neutrophils Relative 02/10/2022 55  43 - 75 % Final    Immat GRANS % 02/10/2022 0  0 - 2 % Final    Lymphocytes Relative 02/10/2022 31  14 - 44 % Final    Monocytes Relative 02/10/2022 10  4 - 12 % Final    Eosinophils Relative 02/10/2022 3  0 - 6 % Final    Basophils Relative 02/10/2022 1  0 - 1 % Final    Neutrophils Absolute 02/10/2022 3 42  1 85 - 7 62 Thousands/µL Final    Immature Grans Absolute 02/10/2022 0 01  0 00 - 0 20 Thousand/uL Final    Lymphocytes Absolute 02/10/2022 1 88  0 60 - 4 47 Thousands/µL Final    Monocytes Absolute 02/10/2022 0 61  0 17 - 1 22 Thousand/µL Final    Eosinophils Absolute 02/10/2022 0 16  0 00 - 0 61 Thousand/µL Final    Basophils Absolute 02/10/2022 0 04  0 00 - 0 10 Thousands/µL Final    Sodium 02/10/2022 139  136 - 145 mmol/L Final    Potassium 02/10/2022 4 1  3 5 - 5 3 mmol/L Final    Chloride 02/10/2022 103  100 - 108 mmol/L Final    CO2 02/10/2022 27  21 - 32 mmol/L Final    ANION GAP 02/10/2022 9  4 - 13 mmol/L Final    BUN 02/10/2022 17  5 - 25 mg/dL Final    Creatinine 02/10/2022 0 75  0 60 - 1 30 mg/dL Final    Standardized to IDMS reference method    Glucose, Fasting 02/10/2022 82  65 - 99 mg/dL Final    Specimen collection should occur prior to Sulfasalazine administration due to the potential for falsely depressed results  Specimen collection should occur prior to Sulfapyridine administration due to the potential for falsely elevated results   Calcium 02/10/2022 9 0  8 3 - 10 1 mg/dL Final    AST 02/10/2022 22  5 - 45 U/L Final    Specimen collection should occur prior to Sulfasalazine administration due to the potential for falsely depressed results   ALT 02/10/2022 55  12 - 78 U/L Final    Specimen collection should occur prior to Sulfasalazine administration due to the potential for falsely depressed results   Alkaline Phosphatase 02/10/2022 62  46 - 116 U/L Final    Total Protein 02/10/2022 7 4  6 4 - 8 2 g/dL Final    Albumin 02/10/2022 3 8  3 5 - 5 0 g/dL Final    Total Bilirubin 02/10/2022 0 27  0 20 - 1 00 mg/dL Final    Use of this assay is not recommended for patients undergoing treatment with eltrombopag due to the potential for falsely elevated results      eGFR 02/10/2022 91 ml/min/1 73sq m Final    CRP 02/10/2022 <3 0  <3 0 mg/L Final    Sed Rate 02/10/2022 6  0 - 29 mm/hour Final   Office Visit on 10/13/2021   Component Date Value Ref Range Status    WBC 10/13/2021 8 14  4 31 - 10 16 Thousand/uL Final    RBC 10/13/2021 3 59* 3 81 - 5 12 Million/uL Final    Hemoglobin 10/13/2021 11 0* 11 5 - 15 4 g/dL Final    Hematocrit 10/13/2021 34 4* 34 8 - 46 1 % Final    MCV 10/13/2021 96  82 - 98 fL Final    MCH 10/13/2021 30 6  26 8 - 34 3 pg Final    MCHC 10/13/2021 32 0  31 4 - 37 4 g/dL Final    RDW 10/13/2021 12 7  11 6 - 15 1 % Final    MPV 10/13/2021 9 0  8 9 - 12 7 fL Final    Platelets 33/65/8811 333  149 - 390 Thousands/uL Final    nRBC 10/13/2021 0  /100 WBCs Final    Neutrophils Relative 10/13/2021 57  43 - 75 % Final    Immat GRANS % 10/13/2021 0  0 - 2 % Final    Lymphocytes Relative 10/13/2021 31  14 - 44 % Final    Monocytes Relative 10/13/2021 9  4 - 12 % Final    Eosinophils Relative 10/13/2021 2  0 - 6 % Final    Basophils Relative 10/13/2021 1  0 - 1 % Final    Neutrophils Absolute 10/13/2021 4 65  1 85 - 7 62 Thousands/µL Final    Immature Grans Absolute 10/13/2021 0 02  0 00 - 0 20 Thousand/uL Final    Lymphocytes Absolute 10/13/2021 2 53  0 60 - 4 47 Thousands/µL Final    Monocytes Absolute 10/13/2021 0 71  0 17 - 1 22 Thousand/µL Final    Eosinophils Absolute 10/13/2021 0 17  0 00 - 0 61 Thousand/µL Final    Basophils Absolute 10/13/2021 0 06  0 00 - 0 10 Thousands/µL Final    Sodium 10/13/2021 138  136 - 145 mmol/L Final    Potassium 10/13/2021 5 1  3 5 - 5 3 mmol/L Final    Chloride 10/13/2021 104  100 - 108 mmol/L Final    CO2 10/13/2021 27  21 - 32 mmol/L Final    ANION GAP 10/13/2021 7  4 - 13 mmol/L Final    BUN 10/13/2021 27* 5 - 25 mg/dL Final    Creatinine 10/13/2021 0 94  0 60 - 1 30 mg/dL Final    Standardized to IDMS reference method    Glucose 10/13/2021 90  65 - 140 mg/dL Final    If the patient is fasting, the ADA then defines impaired fasting glucose as > 100 mg/dL and diabetes as > or equal to 123 mg/dL  Specimen collection should occur prior to Sulfasalazine administration due to the potential for falsely depressed results  Specimen collection should occur prior to Sulfapyridine administration due to the potential for falsely elevated results   Calcium 10/13/2021 8 8  8 3 - 10 1 mg/dL Final    AST 10/13/2021 18  5 - 45 U/L Final    Specimen collection should occur prior to Sulfasalazine administration due to the potential for falsely depressed results   ALT 10/13/2021 43  12 - 78 U/L Final    Specimen collection should occur prior to Sulfasalazine administration due to the potential for falsely depressed results   Alkaline Phosphatase 10/13/2021 57  46 - 116 U/L Final    Total Protein 10/13/2021 7 3  6 4 - 8 2 g/dL Final    Albumin 10/13/2021 4 0  3 5 - 5 0 g/dL Final    Total Bilirubin 10/13/2021 0 12* 0 20 - 1 00 mg/dL Final    Use of this assay is not recommended for patients undergoing treatment with eltrombopag due to the potential for falsely elevated results      eGFR 10/13/2021 70  ml/min/1 73sq m Final    CRP 10/13/2021 <3 0  <3 0 mg/L Final    Sed Rate 10/13/2021 8  0 - 29 mm/hour Final

## 2022-02-16 NOTE — PATIENT INSTRUCTIONS
Continue diclofenac twice a day as needed for joint pain  Continue diclofenac gel as needed for joint pain  Continue folic acid daily  Continue methotrexate 8 tabs once a week; hold one methotrexate dose after COVID vaccine  Can take Vit  D daily  Stop hydroxychloroquine,  Will work on prior auth for Humira every other week injection  Do labs before next visit  fmla paperwork filled out    Return to clinic in 4 months    Rheumatoid Arthritis   AMBULATORY CARE:   Rheumatoid arthritis  is a long-term autoimmune disease that causes inflammation and damage to your joints  RA causes your body's immune system to attack the synovial membrane (lining) in your joints  RA can also affect other organs, such as your eyes, heart, or lungs  It may also increase your risk of osteoporosis (weakened bones)  Common symptoms include the following:   · Joint pain and stiffness that lasts longer than 1 hour    · Swollen joints in the same joint on both sides of your body    · Loss of joint movement    · Firm, round nodules (growths) on your joints    · Fatigue or muscle weakness    · Loss of appetite or weight loss    Call your doctor or rheumatologist if:   · You have a fever  · You have increased joint swelling, pain, or redness  · Your skin is itchy, swollen, or has a rash  · Your symptoms are getting worse, even with treatment  · You have questions or concerns about your condition or care  Treatment:  The goal of treatment within the first year is remission (no pain or inflammation)  If full remission cannot be reached, the goal is as few arthritis flares as possible  Early treatment can also help prevent or slow joint damage  Treatment may change after the first year, depending on how your body responds  · Antirheumatics  help slow the progress of RA, and reduce pain, stiffness, and inflammation  · NSAIDs , such as ibuprofen, help decrease swelling, pain, and fever   This medicine is available with or without a doctor's order  NSAIDs can cause stomach bleeding or kidney problems in certain people  If you take blood thinner medicine, always ask your healthcare provider if NSAIDs are safe for you  Always read the medicine label and follow directions  · Steroids  help decrease inflammation  · Biologic therapy  helps decrease joint swelling, pain, and stiffness  These medicines increase the risk of serious infection and need careful monitoring  · Surgery  may be needed to remove all or part of your joint  An implant may be placed to help reduce pain and repair the joint  Manage your symptoms:   · Rest when needed  Rest is important if your joints are painful  Limit your activities until your symptoms improve  Gradually start your normal activities when you can do them without pain  Avoid motions and activities that cause strain on your joints, such as heavy exercise and lifting  · Use ice or heat  Both can help decrease swelling and pain  Ice may also help prevent tissue damage  Use an ice pack, or put crushed ice in a plastic bag  Cover it with a towel and place it on your joint for 15 to 20 minutes every hour or as directed  You can apply heat for 20 minutes every 2 hours  Heat treatment includes hot packs, heat lamps, warm baths, or showers  · Elevate your joint  Elevation helps reduce swelling and pain  Raise your joint above the level of your heart as often as you can  Prop your painful joint on pillows to keep it above your heart comfortably  Manage RA:   · Talk to your healthcare providers about your arthritis medicines  Some medicines may only be needed when you have arthritis pain  You may need to take other medicines every day to prevent arthritis from getting worse  Your healthcare providers will help you understand all your medicines and when to take them  It is important to take the medicines as directed, even if you start to feel better   You can continue to have joint damage and inflammation even if you do not feel it  · Eat a variety of healthy foods  Healthy foods include fruits, vegetables, whole-grain breads, low-fat dairy products, beans, lean meats, and fish  Ask if you need to be on a special diet  A diet rich in calcium and vitamin D may decrease your risk of osteoporosis  Foods high in calcium include milk, cheese, broccoli, and tofu  Vitamin D may be found in meat, fish, fortified milk, cereal and bread  Ask if you need calcium or vitamin D supplements  · Maintain a healthy weight  This may help decrease strain on joints in your back, knees, ankles, and feet  Ask your healthcare provider what a healthy weight is for you  Ask him or her to help you create a weight loss plan if you are overweight  Exercise can help you maintain a healthy weight  · Go to physical or occupational therapy as directed  Physical activity can help relieve pain and stiffness  A physical therapist can teach you exercises to improve flexibility and range of motion  You may also be shown non-weight-bearing exercises that are safe for your joints, such as swimming  An occupational therapist can help you learn to do your daily activities when your joints are stiff or sore  · Do not smoke  Nicotine and other chemicals in cigarettes and cigars can damage your bones and joints  Ask your healthcare provider for information if you currently smoke and need help to quit  E-cigarettes or smokeless tobacco still contain nicotine  Talk to your healthcare provider before you use these products  RA medicines and pregnancy:   · Men:  Talk to your doctor about your RA and the medicines you take  Some medicines may keep your partner from getting pregnant  Talk to your doctor if you and your partner are discussing pregnancy  · Women:  Talk to your gynecologist about contraceptives  Tell him or her about your RA and what medicines you take   He or she will tell you what the best contraceptive for will be  Not all contraceptives are effective if you have RA and are taking certain medicines  You may not be able to breastfeed if you are taking certain RA medicines  Support devices to help manage arthritis:       · Orthotic shoes or insoles  help support your feet when you walk  · Crutches, a cane, or a walker  may help decrease your risk for falling  They also decrease stress on affected joints  · Devices to prevent falls  include raised toilet seats and bathtub bars to help you get up from sitting  Handrails can be placed in areas where you need balance and support  · Devices to help with support and rest  include splints to wear on your hands and a firm pillow while you sleep  Use a pillow that is firm enough to support your neck and head  Ask your healthcare provider about vaccines:  RA or its treatment may increase your risk for infections  Vaccines can help protect you from infections caused by certain bacteria or viruses  Follow up with your doctor as directed:  Write down your questions so you remember to ask them during your visits  © Copyright [employer] 2021 Information is for End User's use only and may not be sold, redistributed or otherwise used for commercial purposes  All illustrations and images included in CareNotes® are the copyrighted property of A D A M [employer] Inc  or [employer]  The above information is an  only  It is not intended as medical advice for individual conditions or treatments  Talk to your doctor, nurse or pharmacist before following any medical regimen to see if it is safe and effective for you

## 2022-02-16 NOTE — LETTER
February 16, 2022     Patient: Kely Arvizu   YOB: 1970   Date of Visit: 2/16/2022       To Whom it May Concern:    Kely Arvizu is under my professional care  She was seen in my office on 2/16/2022  If you have any questions or concerns, please don't hesitate to call           Sincerely,          Tennille Cardona MD        CC: Marianojean pierre Luh

## 2022-02-17 ENCOUNTER — PROCEDURE VISIT (OUTPATIENT)
Dept: OBGYN CLINIC | Facility: CLINIC | Age: 52
End: 2022-02-17
Payer: COMMERCIAL

## 2022-02-17 VITALS — HEIGHT: 62 IN | BODY MASS INDEX: 32.76 KG/M2 | WEIGHT: 178 LBS

## 2022-02-17 DIAGNOSIS — M17.12 PRIMARY OSTEOARTHRITIS OF LEFT KNEE: Primary | ICD-10-CM

## 2022-02-17 PROCEDURE — 3008F BODY MASS INDEX DOCD: CPT | Performed by: INTERNAL MEDICINE

## 2022-02-17 PROCEDURE — 20610 DRAIN/INJ JOINT/BURSA W/O US: CPT | Performed by: ORTHOPAEDIC SURGERY

## 2022-02-17 RX ORDER — HYALURONATE SODIUM 10 MG/ML
20 SYRINGE (ML) INTRAARTICULAR
Status: COMPLETED | OUTPATIENT
Start: 2022-02-17 | End: 2022-02-17

## 2022-02-17 RX ADMIN — Medication 20 MG: at 07:57

## 2022-02-17 NOTE — PROGRESS NOTES
1  Primary osteoarthritis of left knee  Large joint arthrocentesis     Patient is here for her 3rd injection of euflexxa into the left knee  Patient reports some swelling after last week's injection but this has subsided she also had a little bit of medial-sided knee pain  Physical exam of the knee shows no effusion no ecchymosis  All other organ systems normal    Large joint arthrocentesis: L knee  Universal Protocol:  Consent given by: patient  Time out: Immediately prior to procedure a "time out" was called to verify the correct patient, procedure, equipment, support staff and site/side marked as required    Site marked: the operative site was marked  Supporting Documentation  Indications: pain   Procedure Details  Location: knee - L knee  Preparation: Patient was prepped and draped in the usual sterile fashion  Needle size: 22 G  Ultrasound guidance: no  Approach: anterolateral  Medications administered: 20 mg Sodium Hyaluronate 20 MG/2ML  Specialty Pharmacy Supplied: received medications from pharmacy  Patient tolerance: patient tolerated the procedure well with no immediate complications  Dressing:  Sterile dressing applied          Patient tolerated procedure follow up PRN

## 2022-02-22 ENCOUNTER — TELEPHONE (OUTPATIENT)
Dept: RHEUMATOLOGY | Facility: CLINIC | Age: 52
End: 2022-02-22

## 2022-02-22 DIAGNOSIS — Z79.899 HIGH RISK MEDICATION USE: ICD-10-CM

## 2022-02-22 DIAGNOSIS — M05.79 RHEUMATOID ARTHRITIS INVOLVING MULTIPLE SITES WITH POSITIVE RHEUMATOID FACTOR (HCC): Primary | ICD-10-CM

## 2022-02-22 NOTE — TELEPHONE ENCOUNTER
Patient scheduled for follow up on 6/29   Dr Lagunas is out of the office in the PM  Voicemail left to have patient call back and reschedule asap

## 2022-02-22 NOTE — TELEPHONE ENCOUNTER
Please work on prior authorization for patient's Humira 40 mg per 0 4 mL citrate free pen injections every other week for her seropositive rheumatoid arthritis  She has tried and failed hydroxychloroquine and methotrexate  I placed new orders for TB test and viral hepatitis panel in the system  Please ask her to complete them as soon as possible so you can work on the Humira prior authorization  Let me know once approved so I can send the prescription to her specialty pharmacy

## 2022-02-22 NOTE — TELEPHONE ENCOUNTER
Patient was called and a voice message was left asking that she complete the needed blood work so he prior authorization could be started and sent to the insurance

## 2022-02-23 ENCOUNTER — APPOINTMENT (OUTPATIENT)
Dept: LAB | Facility: CLINIC | Age: 52
End: 2022-02-23
Payer: COMMERCIAL

## 2022-02-23 LAB
ALBUMIN SERPL BCP-MCNC: 3.7 G/DL (ref 3.5–5)
ALP SERPL-CCNC: 68 U/L (ref 46–116)
ALT SERPL W P-5'-P-CCNC: 51 U/L (ref 12–78)
ANION GAP SERPL CALCULATED.3IONS-SCNC: 9 MMOL/L (ref 4–13)
AST SERPL W P-5'-P-CCNC: 28 U/L (ref 5–45)
BASOPHILS # BLD MANUAL: 0.13 THOUSAND/UL (ref 0–0.1)
BASOPHILS NFR MAR MANUAL: 2 % (ref 0–1)
BILIRUB SERPL-MCNC: 0.33 MG/DL (ref 0.2–1)
BUN SERPL-MCNC: 16 MG/DL (ref 5–25)
CALCIUM SERPL-MCNC: 9.4 MG/DL (ref 8.3–10.1)
CHLORIDE SERPL-SCNC: 105 MMOL/L (ref 100–108)
CO2 SERPL-SCNC: 24 MMOL/L (ref 21–32)
CREAT SERPL-MCNC: 0.7 MG/DL (ref 0.6–1.3)
CRP SERPL QL: 5.4 MG/L
EOSINOPHIL # BLD MANUAL: 0.07 THOUSAND/UL (ref 0–0.4)
EOSINOPHIL NFR BLD MANUAL: 1 % (ref 0–6)
ERYTHROCYTE [DISTWIDTH] IN BLOOD BY AUTOMATED COUNT: 12.8 % (ref 11.6–15.1)
ERYTHROCYTE [SEDIMENTATION RATE] IN BLOOD: 10 MM/HOUR (ref 0–29)
GFR SERPL CREATININE-BSD FRML MDRD: 99 ML/MIN/1.73SQ M
GLUCOSE SERPL-MCNC: 84 MG/DL (ref 65–140)
HBV CORE AB SER QL: NORMAL
HBV CORE IGM SER QL: NORMAL
HBV SURFACE AG SER QL: NORMAL
HCT VFR BLD AUTO: 37.3 % (ref 34.8–46.1)
HCV AB SER QL: NORMAL
HGB BLD-MCNC: 12.2 G/DL (ref 11.5–15.4)
LYMPHOCYTES # BLD AUTO: 2.48 THOUSAND/UL (ref 0.6–4.47)
LYMPHOCYTES # BLD AUTO: 37 % (ref 14–44)
MCH RBC QN AUTO: 30.2 PG (ref 26.8–34.3)
MCHC RBC AUTO-ENTMCNC: 32.7 G/DL (ref 31.4–37.4)
MCV RBC AUTO: 92 FL (ref 82–98)
MONOCYTES # BLD AUTO: 0.27 THOUSAND/UL (ref 0–1.22)
MONOCYTES NFR BLD: 4 % (ref 4–12)
NEUTROPHILS # BLD MANUAL: 3.75 THOUSAND/UL (ref 1.85–7.62)
NEUTS SEG NFR BLD AUTO: 56 % (ref 43–75)
PLATELET # BLD AUTO: 342 THOUSANDS/UL (ref 149–390)
PLATELET BLD QL SMEAR: ADEQUATE
PMV BLD AUTO: 9 FL (ref 8.9–12.7)
POTASSIUM SERPL-SCNC: 5.1 MMOL/L (ref 3.5–5.3)
PROT SERPL-MCNC: 7.7 G/DL (ref 6.4–8.2)
RBC # BLD AUTO: 4.04 MILLION/UL (ref 3.81–5.12)
RBC MORPH BLD: NORMAL
SODIUM SERPL-SCNC: 138 MMOL/L (ref 136–145)
WBC # BLD AUTO: 6.7 THOUSAND/UL (ref 4.31–10.16)

## 2022-02-23 PROCEDURE — 36415 COLL VENOUS BLD VENIPUNCTURE: CPT | Performed by: INTERNAL MEDICINE

## 2022-02-23 PROCEDURE — 85027 COMPLETE CBC AUTOMATED: CPT | Performed by: INTERNAL MEDICINE

## 2022-02-23 PROCEDURE — 86140 C-REACTIVE PROTEIN: CPT | Performed by: INTERNAL MEDICINE

## 2022-02-23 PROCEDURE — 86480 TB TEST CELL IMMUN MEASURE: CPT | Performed by: INTERNAL MEDICINE

## 2022-02-23 PROCEDURE — 86803 HEPATITIS C AB TEST: CPT | Performed by: INTERNAL MEDICINE

## 2022-02-23 PROCEDURE — 86705 HEP B CORE ANTIBODY IGM: CPT | Performed by: INTERNAL MEDICINE

## 2022-02-23 PROCEDURE — 86704 HEP B CORE ANTIBODY TOTAL: CPT | Performed by: INTERNAL MEDICINE

## 2022-02-23 PROCEDURE — 85007 BL SMEAR W/DIFF WBC COUNT: CPT | Performed by: INTERNAL MEDICINE

## 2022-02-23 PROCEDURE — 80053 COMPREHEN METABOLIC PANEL: CPT | Performed by: INTERNAL MEDICINE

## 2022-02-23 PROCEDURE — 87340 HEPATITIS B SURFACE AG IA: CPT | Performed by: INTERNAL MEDICINE

## 2022-02-23 PROCEDURE — 85652 RBC SED RATE AUTOMATED: CPT | Performed by: INTERNAL MEDICINE

## 2022-02-24 LAB
GAMMA INTERFERON BACKGROUND BLD IA-ACNC: 0.02 IU/ML
M TB IFN-G BLD-IMP: NEGATIVE
M TB IFN-G CD4+ BCKGRND COR BLD-ACNC: 0 IU/ML
M TB IFN-G CD4+ BCKGRND COR BLD-ACNC: 0.01 IU/ML
MITOGEN IGNF BCKGRD COR BLD-ACNC: 6.09 IU/ML

## 2022-02-25 DIAGNOSIS — M05.79 RHEUMATOID ARTHRITIS INVOLVING MULTIPLE SITES WITH POSITIVE RHEUMATOID FACTOR (HCC): Primary | ICD-10-CM

## 2022-02-25 RX ORDER — ADALIMUMAB 40MG/0.4ML
40 KIT SUBCUTANEOUS
Qty: 2 EACH | Refills: 11 | Status: SHIPPED | OUTPATIENT
Start: 2022-02-25 | End: 2022-07-06 | Stop reason: ALTCHOICE

## 2022-03-02 ENCOUNTER — TELEPHONE (OUTPATIENT)
Dept: FAMILY MEDICINE CLINIC | Facility: CLINIC | Age: 52
End: 2022-03-02

## 2022-03-02 NOTE — TELEPHONE ENCOUNTER
KLEVER for patient to call office due to receiving a fax from [address] [place] [address]  for refill on Phentermine  This medication was filled at [facility] on 2/28/2022

## 2022-03-03 NOTE — TELEPHONE ENCOUNTER
Patient called to let the office know that going forward she will continue to use [facility] and not [facility] for all her prescriptions  No need for the office to change anything at this time

## 2022-03-08 DIAGNOSIS — R63.5 WEIGHT GAIN: ICD-10-CM

## 2022-04-18 ENCOUNTER — TELEPHONE (OUTPATIENT)
Dept: FAMILY MEDICINE CLINIC | Facility: CLINIC | Age: 52
End: 2022-04-18

## 2022-04-18 NOTE — TELEPHONE ENCOUNTER
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

## 2022-04-20 ENCOUNTER — OFFICE VISIT (OUTPATIENT)
Dept: OBGYN CLINIC | Facility: CLINIC | Age: 52
End: 2022-04-20
Payer: COMMERCIAL

## 2022-04-20 VITALS — HEIGHT: 62 IN | WEIGHT: 178 LBS | BODY MASS INDEX: 32.76 KG/M2

## 2022-04-20 DIAGNOSIS — M05.79 RHEUMATOID ARTHRITIS INVOLVING MULTIPLE SITES WITH POSITIVE RHEUMATOID FACTOR (HCC): ICD-10-CM

## 2022-04-20 DIAGNOSIS — M17.12 PRIMARY OSTEOARTHRITIS OF LEFT KNEE: Primary | ICD-10-CM

## 2022-04-20 PROCEDURE — 99213 OFFICE O/P EST LOW 20 MIN: CPT | Performed by: ORTHOPAEDIC SURGERY

## 2022-04-20 PROCEDURE — 20610 DRAIN/INJ JOINT/BURSA W/O US: CPT | Performed by: ORTHOPAEDIC SURGERY

## 2022-04-20 PROCEDURE — 1036F TOBACCO NON-USER: CPT | Performed by: ORTHOPAEDIC SURGERY

## 2022-04-20 RX ORDER — LIDOCAINE HYDROCHLORIDE 10 MG/ML
1 INJECTION, SOLUTION INFILTRATION; PERINEURAL
Status: COMPLETED | OUTPATIENT
Start: 2022-04-20 | End: 2022-04-20

## 2022-04-20 RX ORDER — BETAMETHASONE SODIUM PHOSPHATE AND BETAMETHASONE ACETATE 3; 3 MG/ML; MG/ML
12 INJECTION, SUSPENSION INTRA-ARTICULAR; INTRALESIONAL; INTRAMUSCULAR; SOFT TISSUE
Status: COMPLETED | OUTPATIENT
Start: 2022-04-20 | End: 2022-04-20

## 2022-04-20 RX ORDER — BUPIVACAINE HYDROCHLORIDE 2.5 MG/ML
1 INJECTION, SOLUTION INFILTRATION; PERINEURAL
Status: COMPLETED | OUTPATIENT
Start: 2022-04-20 | End: 2022-04-20

## 2022-04-20 RX ADMIN — BUPIVACAINE HYDROCHLORIDE 1 ML: 2.5 INJECTION, SOLUTION INFILTRATION; PERINEURAL at 11:15

## 2022-04-20 RX ADMIN — BETAMETHASONE SODIUM PHOSPHATE AND BETAMETHASONE ACETATE 12 MG: 3; 3 INJECTION, SUSPENSION INTRA-ARTICULAR; INTRALESIONAL; INTRAMUSCULAR; SOFT TISSUE at 11:15

## 2022-04-20 RX ADMIN — LIDOCAINE HYDROCHLORIDE 1 ML: 10 INJECTION, SOLUTION INFILTRATION; PERINEURAL at 11:15

## 2022-04-20 NOTE — PROGRESS NOTES
Assessment:  1  Primary osteoarthritis of left knee  Large joint arthrocentesis   2  Rheumatoid arthritis involving multiple sites with positive rheumatoid factor (HCC)  Large joint arthrocentesis     Patient Active Problem List   Diagnosis    Rheumatoid arthritis involving multiple sites with positive rheumatoid factor (HCC)    High risk medication use    Arthritis    Calcific tendinitis    Cervical pain    COVID-19    Low back pain with sciatica    Lumbar radiculopathy    Lumbar disc herniation    Lumbar spondylosis    Primary osteoarthritis of left knee       Plan:    47 y o  female  with left knee arthritic flare     Cortisone injection given to left knee joint today patient tolerated this well   Patient will contact us if pain worsens or is not improved after 1 week or two   Based on today's examination and her history have a low suspicion for meniscal pathology she does have significant arthritis in the medial aspect of the knee as well however MRI might be considered based on how her symptoms fare over the next several weeks   Follow-up p r n  The assessment and plan were formulated by Dr Aurora Paz and I assisted in carrying it out  Subjective:   Patient ID: Mariely Nayak is a 47 y o  female   HPI    Patient presents to the office for follow up of left knee pain  Since the last visit, the patient reports increased pain left knee  This occurred after mowing grass late last week  She has had pain in the medial aspect of the knee  Also has some tightness in her calf some pain with plain the foot but no specific injury to the left ankle or calf  She notes still having some relief from the Visco injection she got of months ago  Denies any numbness tingling fevers or chills        The following portions of the patient's history were reviewed and updated as appropriate: allergies, current medications, past family history, past social history, past surgical history and problem list     Social History     Socioeconomic History    Marital status: Single     Spouse name: Not on file    Number of children: Not on file    Years of education: Not on file    Highest education level: Not on file   Occupational History    Not on file   Tobacco Use    Smoking status: Former Smoker     Packs/day: 1 00     Years: 35 00     Pack years: 35 00    Smokeless tobacco: Current User    Tobacco comment: occasional vaping use   Vaping Use    Vaping Use: Every day    Substances: Nicotine, Flavoring   Substance and Sexual Activity    Alcohol use: Yes     Comment: occassionally    Drug use: Never    Sexual activity: Yes   Other Topics Concern    Not on file   Social History Narrative    Not on file     Social Determinants of Health     Financial Resource Strain: Not on file   Food Insecurity: Not on file   Transportation Needs: Not on file   Physical Activity: Not on file   Stress: Not on file   Social Connections: Not on file   Intimate Partner Violence: Not on file   Housing Stability: Not on file     Past Medical History:   Diagnosis Date    Anxiety     Depression     Knee pain     Pinched nerve     L5, L2    Rheumatoid arthritis ([facility] [address] )      Past Surgical History:   Procedure Laterality Date    ROTATOR CUFF REPAIR       Allergies   Allergen Reactions    Clarithromycin GI Intolerance    Seasonal Ic [Cholestatin] Other (See Comments)     stuffiness     Current Outpatient Medications on File Prior to Visit   Medication Sig Dispense Refill    Acetaminophen (TYLENOL ARTHRITIS PAIN PO) Take by mouth        Adalimumab (Humira Pen) 40 MG/0 4ML PNKT Inject 40 mg under the skin every 14 (fourteen) days 2 each 11    Ascorbic Acid (vitamin C) 1000 MG tablet Take 1,000 mg by mouth daily      cholecalciferol (VITAMIN D3) 1,000 units tablet Take 2,000 Units by mouth      diclofenac (VOLTAREN) 75 mg EC tablet Take 1 tablet (75 mg total) by mouth 2 (two) times a day Can take as needed for joint pain 180 tablet 1    Diclofenac Sodium (VOLTAREN) 1 % Apply 2 g topically 4 (four) times a day (Patient not taking: Reported on 1/28/2022 ) 300 g 3    doxycycline monohydrate (MONODOX) 100 mg capsule Take 1 capsule (100 mg total) by mouth 2 (two) times a day for 10 days 20 capsule 0    folic acid (FOLVITE) 1 mg tablet Take 1 tablet (1 mg total) by mouth daily 90 tablet 1    hydroxychloroquine (PLAQUENIL) 200 mg tablet Take 1 tablet (200 mg total) by mouth 2 (two) times a day 180 tablet 1    magnesium gluconate (MAGONATE) 500 mg tablet Take 500 mg by mouth daily      methotrexate 2 5 mg tablet 8 tablet po weekly (take all 8 tablets on the same day every week) 96 tablet 1    multivitamin (THERAGRAN) TABS Take 1 tablet by mouth daily      phentermine 30 MG capsule Take 1 capsule (30 mg total) by mouth every morning 30 capsule 2    pyridoxine (B-6) 100 MG tablet Take 100 mg by mouth      topiramate (Topamax) 25 mg tablet Take 1 tablet (25 mg total) by mouth 2 (two) times a day 180 tablet 1    vitamin B-12 (VITAMIN B-12) 1,000 mcg tablet Take by mouth daily      zinc gluconate 50 mg tablet Take 50 mg by mouth daily       No current facility-administered medications on file prior to visit  Review of Systems  See HPi    Objective: There were no vitals filed for this visit  Physical Exam  Constitutional:       Appearance: She is well-developed  HENT:      Head: Normocephalic and atraumatic  Eyes:      General: No scleral icterus  Conjunctiva/sclera: Conjunctivae normal    Cardiovascular:      Comments: No discernible arrhthymias  Pulmonary:      Effort: Pulmonary effort is normal  No respiratory distress  Breath sounds: No stridor  Abdominal:      General: There is no distension  Palpations: Abdomen is soft  Musculoskeletal:      Cervical back: Neck supple  Left knee: No effusion  Instability Tests: Medial Tony test negative and lateral Tony test negative     Skin: General: Skin is warm and dry  Findings: No erythema  Neurological:      Mental Status: She is alert and oriented to person, place, and time  Psychiatric:         Behavior: Behavior normal          Left Knee Exam     Tenderness   The patient is experiencing tenderness in the medial joint line  Range of Motion   The patient has normal left knee ROM  Tests   Tony:  Medial - negative Lateral - negative  Varus: negative Valgus: negative    Other   Erythema: absent  Scars: absent  Sensation: normal  Pulse: present  Swelling: mild  Effusion: no effusion present              Large joint arthrocentesis: L knee  Universal Protocol:  Consent given by: patient  Time out: Immediately prior to procedure a "time out" was called to verify the correct patient, procedure, equipment, support staff and site/side marked as required  Site marked: the operative site was marked  Supporting Documentation  Indications: pain   Procedure Details  Location: knee - L knee  Preparation: Patient was prepped and draped in the usual sterile fashion  Needle size: 22 G  Approach: anterolateral  Medications administered: 1 mL lidocaine 1 %; 12 mg betamethasone acetate-betamethasone sodium phosphate 6 (3-3) mg/mL; 1 mL bupivacaine 0 25 %    Patient tolerance: patient tolerated the procedure well with no immediate complications  Dressing:  Sterile dressing applied            Portions of the record may have been created with voice recognition software  Occasional wrong word or "sound a like" substitutions may have occurred due to the inherent limitations of voice recognition software  Read the chart carefully and recognize, using context, where substitutions have occurred

## 2022-04-22 ENCOUNTER — RA CDI HCC (OUTPATIENT)
Dept: OTHER | Facility: HOSPITAL | Age: 52
End: 2022-04-22

## 2022-04-22 NOTE — PROGRESS NOTES
[facility]  coding opportunities       Chart reviewed, no opportunity found: CHART REVIEWED, NO OPPORTUNITY FOUND        Patients Insurance        Commercial Insurance: Herb Griffin

## 2022-04-29 ENCOUNTER — OFFICE VISIT (OUTPATIENT)
Dept: FAMILY MEDICINE CLINIC | Facility: CLINIC | Age: 52
End: 2022-04-29
Payer: COMMERCIAL

## 2022-04-29 VITALS
TEMPERATURE: 97.6 F | DIASTOLIC BLOOD PRESSURE: 90 MMHG | OXYGEN SATURATION: 99 % | RESPIRATION RATE: 12 BRPM | HEART RATE: 91 BPM | WEIGHT: 183.2 LBS | SYSTOLIC BLOOD PRESSURE: 130 MMHG | HEIGHT: 62 IN | BODY MASS INDEX: 33.71 KG/M2

## 2022-04-29 DIAGNOSIS — M17.12 PRIMARY OSTEOARTHRITIS OF LEFT KNEE: ICD-10-CM

## 2022-04-29 DIAGNOSIS — Z13.1 SCREENING FOR DIABETES MELLITUS: ICD-10-CM

## 2022-04-29 DIAGNOSIS — M05.79 RHEUMATOID ARTHRITIS INVOLVING MULTIPLE SITES WITH POSITIVE RHEUMATOID FACTOR (HCC): ICD-10-CM

## 2022-04-29 DIAGNOSIS — Z13.6 SCREENING FOR CARDIOVASCULAR CONDITION: ICD-10-CM

## 2022-04-29 DIAGNOSIS — R63.5 WEIGHT GAIN: Primary | ICD-10-CM

## 2022-04-29 DIAGNOSIS — E78.5 MILD HYPERLIPIDEMIA: ICD-10-CM

## 2022-04-29 PROCEDURE — 99214 OFFICE O/P EST MOD 30 MIN: CPT | Performed by: FAMILY MEDICINE

## 2022-04-29 PROCEDURE — 3008F BODY MASS INDEX DOCD: CPT | Performed by: ORTHOPAEDIC SURGERY

## 2022-04-29 RX ORDER — PHENTERMINE HYDROCHLORIDE 30 MG/1
30 CAPSULE ORAL EVERY MORNING
Qty: 30 CAPSULE | Refills: 2 | Status: SHIPPED | OUTPATIENT
Start: 2022-04-29

## 2022-04-29 NOTE — PROGRESS NOTES
FAMILY PRACTICE OFFICE VISIT       NAME: Ghada Baltazar  AGE: 47 y o  SEX: female       : 1970        MRN: [id number]    DATE: 2022  TIME: 4:54 PM    Assessment and Plan   1  Weight gain  Comments:  Pt stable on exam  She is to continue a lower carb/salt diet, & light exercise  Phentermine refilled ([place] PDMP checked); try adding Metformin  Orders:  -     metFORMIN (GLUCOPHAGE) 500 mg tablet; Take 1 tablet (500 mg total) by mouth daily with dinner  -     phentermine 30 MG capsule; Take 1 capsule (30 mg total) by mouth every morning    2  Rheumatoid arthritis involving multiple sites with positive rheumatoid factor (HCC)  Comments:  Stable; on Humira  Continues to work with Rheumatology  Given pt's reaction to first COV-19 vaccine, I did agree to sign her medical waiver for further  3  Primary osteoarthritis of left knee  Comments:  Ongoing - pt working with Ortho now  4  Mild hyperlipidemia  Comments:  Pt to also watch her saturated fat intake - repeat FBW in 6 months  Orders:  -     Lipid Panel with Direct LDL reflex; Future    5  Screening for diabetes mellitus  -     Comprehensive metabolic panel; Future    6  Screening for cardiovascular condition  -     Lipid Panel with Direct LDL reflex; Future         There are no Patient Instructions on file for this visit  Chief Complaint     Chief Complaint   Patient presents with    Follow-up     patient being seen for 3 month follow up        History of Present Illness   Ghada Baltazar is a 47y o -year-old female who presents in f/u today  Pt did have the first COV-19 Moderna vaccine, in 2022 - leading a string of reactions, arthralgias, leg swelling, etc   This caused her to delay her next Humira injection, leading to issues with her RA  Had her left knee injected by Ortho  Still seeing Rheumatology  Stopped Topamax due to SE - stopped after 4 days  Discussed most recent labs done - TSH normal, , Gluc 84      Review of Systems Review of Systems   Constitutional: Negative for activity change  +Difficulty losing weight  Respiratory: Negative for shortness of breath  Cardiovascular: Negative for chest pain  Gastrointestinal: Negative for abdominal pain  Musculoskeletal: Positive for arthralgias  Active Problem List     Patient Active Problem List   Diagnosis    Rheumatoid arthritis involving multiple sites with positive rheumatoid factor (HCC)    High risk medication use    Arthritis    Calcific tendinitis    Cervical pain    COVID-19    Low back pain with sciatica    Lumbar radiculopathy    Lumbar disc herniation    Lumbar spondylosis    Primary osteoarthritis of left knee         Past Medical History:  Past Medical History:   Diagnosis Date    Anxiety     Depression     Knee pain     Pinched nerve     L5, L2    Rheumatoid arthritis ([address] )        Past Surgical History:  Past Surgical History:   Procedure Laterality Date    ROTATOR CUFF REPAIR         Family History:  History reviewed  No pertinent family history      Social History:  Social History     Socioeconomic History    Marital status: Single     Spouse name: Not on file    Number of children: Not on file    Years of education: Not on file    Highest education level: Not on file   Occupational History    Not on file   Tobacco Use    Smoking status: Former Smoker     Packs/day: 1 00     Years: 35 00     Pack years: 35 00    Smokeless tobacco: Current User    Tobacco comment: occasional vaping use   Vaping Use    Vaping Use: Every day    Substances: Nicotine, Flavoring   Substance and Sexual Activity    Alcohol use: Yes     Comment: occassionally    Drug use: Never    Sexual activity: Yes   Other Topics Concern    Not on file   Social History Narrative    Not on file     Social Determinants of Health     Financial Resource Strain: Not on file   Food Insecurity: Not on file   Transportation Needs: Not on file   Physical Activity: Not on file   Stress: Not on file   Social Connections: Not on file   Intimate Partner Violence: Not on file   Housing Stability: Not on file       Objective     Vitals:    04/29/22 1356   BP: 130/90   Pulse: 91   Resp: 12   Temp: 97 6 °F (36 4 °C)   SpO2: 99%     Wt Readings from Last 3 Encounters:   04/29/22 83 1 kg (183 lb 3 2 oz)   04/20/22 80 7 kg (178 lb)   02/17/22 80 7 kg (178 lb)       Physical Exam  Vitals and nursing note reviewed  Constitutional:       General: She is not in acute distress  Appearance: Normal appearance  She is not ill-appearing, toxic-appearing or diaphoretic  HENT:      Head: Normocephalic and atraumatic  Eyes:      General: No scleral icterus  Conjunctiva/sclera: Conjunctivae normal    Cardiovascular:      Rate and Rhythm: Normal rate and regular rhythm  Heart sounds: Normal heart sounds  No murmur heard  No friction rub  No gallop  Pulmonary:      Effort: Pulmonary effort is normal  No respiratory distress  Breath sounds: Normal breath sounds  No stridor  No wheezing, rhonchi or rales  Abdominal:      General: Abdomen is flat  Bowel sounds are normal  There is no distension  Palpations: Abdomen is soft  There is no mass  Tenderness: There is no abdominal tenderness  There is no guarding or rebound  Musculoskeletal:      Cervical back: Normal range of motion and neck supple  No rigidity or tenderness  Legs:    Lymphadenopathy:      Cervical: No cervical adenopathy  Neurological:      Mental Status: She is alert and oriented to person, place, and time  Psychiatric:         Mood and Affect: Mood normal          Behavior: Behavior normal          Thought Content:  Thought content normal          Judgment: Judgment normal          Pertinent Laboratory/Diagnostic Studies:  Lab Results   Component Value Date    BUN 16 02/23/2022    CREATININE 0 70 02/23/2022    CALCIUM 9 4 02/23/2022    K 5 1 02/23/2022    CO2 24 02/23/2022     02/23/2022     Lab Results   Component Value Date    ALT 51 02/23/2022    AST 28 02/23/2022    ALKPHOS 68 02/23/2022       Lab Results   Component Value Date    WBC 6 70 02/23/2022    HGB 12 2 02/23/2022    HCT 37 3 02/23/2022    MCV 92 02/23/2022     02/23/2022       No results found for: TSH    No results found for: CHOL  Lab Results   Component Value Date    TRIG 140 02/10/2022     Lab Results   Component Value Date    HDL 64 02/10/2022     Lab Results   Component Value Date    LDLCALC 144 (H) 02/10/2022     No results found for: HGBA1C    Results for orders placed or performed in visit on 02/22/22   Quantiferon TB Gold Plus   Result Value Ref Range    QFT Nil 0 02 0 - 8 0 IU/ml    QFT TB1-NIL 0 00 IU/ml    QFT TB2-NIL 0 01 IU/ml    QFT Mitogen-NIL 6 09 IU/ml    QFT Final Interpretation Negative Negative   Chronic Hepatitis Panel   Result Value Ref Range    Hepatitis B Surface Ag Non-reactive Non-reactive, NonReactive - Confirmed    Hepatitis C Ab Non-reactive Non-reactive    Hep B C IgM Non-reactive Non-reactive    Hep B Core Total Ab Non-reactive Non-reactive       Orders Placed This Encounter   Procedures    Comprehensive metabolic panel    Lipid Panel with Direct LDL reflex       ALLERGIES:  Allergies   Allergen Reactions    Clarithromycin GI Intolerance    Seasonal Ic [Cholestatin] Other (See Comments)     stuffiness       Current Medications     Current Outpatient Medications   Medication Sig Dispense Refill    Acetaminophen (TYLENOL ARTHRITIS PAIN PO) Take by mouth        Adalimumab (Humira Pen) 40 MG/0 4ML PNKT Inject 40 mg under the skin every 14 (fourteen) days 2 each 11    Ascorbic Acid (vitamin C) 1000 MG tablet Take 1,000 mg by mouth daily      cholecalciferol (VITAMIN D3) 1,000 units tablet Take 2,000 Units by mouth      diclofenac (VOLTAREN) 75 mg EC tablet Take 1 tablet (75 mg total) by mouth 2 (two) times a day Can take as needed for joint pain 982 tablet 1    folic acid (FOLVITE) 1 mg tablet Take 1 tablet (1 mg total) by mouth daily 90 tablet 1    magnesium gluconate (MAGONATE) 500 mg tablet Take 500 mg by mouth daily      methotrexate 2 5 mg tablet 8 tablet po weekly (take all 8 tablets on the same day every week) 96 tablet 1    multivitamin (THERAGRAN) TABS Take 1 tablet by mouth daily      phentermine 30 MG capsule Take 1 capsule (30 mg total) by mouth every morning 30 capsule 2    pyridoxine (B-6) 100 MG tablet Take 100 mg by mouth      vitamin B-12 (VITAMIN B-12) 1,000 mcg tablet Take by mouth daily      zinc gluconate 50 mg tablet Take 50 mg by mouth daily      hydroxychloroquine (PLAQUENIL) 200 mg tablet Take 1 tablet (200 mg total) by mouth 2 (two) times a day (Patient not taking: Reported on 4/29/2022 ) 180 tablet 1    metFORMIN (GLUCOPHAGE) 500 mg tablet Take 1 tablet (500 mg total) by mouth daily with dinner 90 tablet 1     No current facility-administered medications for this visit           Health Maintenance     Health Maintenance   Topic Date Due    Pneumococcal Vaccine: Pediatrics (0 to 5 Years) and At-Risk Patients (6 to 59 Years) (1 of 4 - PCV13) Never done    DTaP,Tdap,and Td Vaccines (1 - Tdap) Never done    Cervical Cancer Screening  Never done    Breast Cancer Screening: Mammogram  Never done    Colorectal Cancer Screening  Never done    COVID-19 Vaccine (2 - Moderna risk 4-dose series) 03/16/2022    Influenza Vaccine (1) 06/30/2022 (Originally 9/1/2021)    HIV Screening  04/20/2023 (Originally 1/15/1985)    Depression Screening  01/28/2023    BMI: Followup Plan  01/28/2023    Annual Physical  01/28/2023    BMI: Adult  04/29/2023    Hepatitis C Screening  Completed    HIB Vaccine  Aged Out    Hepatitis B Vaccine  Aged Out    IPV Vaccine  Aged Out    Hepatitis A Vaccine  Aged Out    Meningococcal ACWY Vaccine  Aged Out    HPV Vaccine  Aged [employer] History   Administered Date(s) Administered    COVID-19 MODERNA VACC 0 5 ML  02/16/2022          Gonzalo Marlow, DO

## 2022-05-16 DIAGNOSIS — Z79.899 HIGH RISK MEDICATION USE: ICD-10-CM

## 2022-05-16 DIAGNOSIS — M05.79 RHEUMATOID ARTHRITIS INVOLVING MULTIPLE SITES WITH POSITIVE RHEUMATOID FACTOR (HCC): ICD-10-CM

## 2022-05-16 RX ORDER — FOLIC ACID 1 MG/1
1 TABLET ORAL DAILY
Qty: 90 TABLET | Refills: 1 | Status: SHIPPED | OUTPATIENT
Start: 2022-05-16 | End: 2022-07-06 | Stop reason: SDUPTHER

## 2022-06-07 ENCOUNTER — TELEPHONE (OUTPATIENT)
Dept: OBGYN CLINIC | Facility: HOSPITAL | Age: 52
End: 2022-06-07

## 2022-06-07 DIAGNOSIS — M05.79 RHEUMATOID ARTHRITIS INVOLVING MULTIPLE SITES WITH POSITIVE RHEUMATOID FACTOR (HCC): ICD-10-CM

## 2022-06-07 RX ORDER — METHOTREXATE 2.5 MG/1
TABLET ORAL
Qty: 96 TABLET | Refills: 1 | Status: SHIPPED | OUTPATIENT
Start: 2022-06-07 | End: 2022-07-06 | Stop reason: SDUPTHER

## 2022-06-07 NOTE — TELEPHONE ENCOUNTER
Pt contacted Call Center requested refill of their medication  Medication Name: methotrexate       Dosage of Med: 2 5 mg       Frequency of Med: once weekly       Remaining Medication: pt has 1 week left       Pharmacy and Location: [facility] Preferred Callback Phone Number:[phone]      Thank you

## 2022-07-06 ENCOUNTER — OFFICE VISIT (OUTPATIENT)
Dept: RHEUMATOLOGY | Facility: CLINIC | Age: 52
End: 2022-07-06
Payer: COMMERCIAL

## 2022-07-06 VITALS
BODY MASS INDEX: 34.41 KG/M2 | DIASTOLIC BLOOD PRESSURE: 76 MMHG | WEIGHT: 187 LBS | SYSTOLIC BLOOD PRESSURE: 125 MMHG | HEIGHT: 62 IN

## 2022-07-06 DIAGNOSIS — Z79.899 HIGH RISK MEDICATION USE: ICD-10-CM

## 2022-07-06 DIAGNOSIS — M17.12 OSTEOARTHRITIS OF LEFT KNEE, UNSPECIFIED OSTEOARTHRITIS TYPE: ICD-10-CM

## 2022-07-06 DIAGNOSIS — M05.79 RHEUMATOID ARTHRITIS INVOLVING MULTIPLE SITES WITH POSITIVE RHEUMATOID FACTOR (HCC): Primary | ICD-10-CM

## 2022-07-06 PROCEDURE — 99215 OFFICE O/P EST HI 40 MIN: CPT | Performed by: INTERNAL MEDICINE

## 2022-07-06 PROCEDURE — 96372 THER/PROPH/DIAG INJ SC/IM: CPT | Performed by: INTERNAL MEDICINE

## 2022-07-06 RX ORDER — HYDROXYCHLOROQUINE SULFATE 200 MG/1
200 TABLET, FILM COATED ORAL 2 TIMES DAILY
Qty: 180 TABLET | Refills: 1 | Status: SHIPPED | OUTPATIENT
Start: 2022-07-06 | End: 2023-01-02

## 2022-07-06 RX ORDER — FOLIC ACID 1 MG/1
1 TABLET ORAL DAILY
Qty: 90 TABLET | Refills: 1 | Status: SHIPPED | OUTPATIENT
Start: 2022-07-06

## 2022-07-06 RX ORDER — FAMOTIDINE 20 MG/1
20 TABLET, FILM COATED ORAL 2 TIMES DAILY
Qty: 180 TABLET | Refills: 6 | Status: SHIPPED | OUTPATIENT
Start: 2022-07-06

## 2022-07-06 RX ORDER — TRIAMCINOLONE ACETONIDE 40 MG/ML
80 INJECTION, SUSPENSION INTRA-ARTICULAR; INTRAMUSCULAR ONCE
Status: COMPLETED | OUTPATIENT
Start: 2022-07-06 | End: 2022-07-06

## 2022-07-06 RX ORDER — METHOTREXATE 2.5 MG/1
TABLET ORAL
Qty: 96 TABLET | Refills: 1 | Status: SHIPPED | OUTPATIENT
Start: 2022-07-06

## 2022-07-06 RX ORDER — IBUPROFEN 800 MG/1
800 TABLET ORAL EVERY 8 HOURS PRN
Qty: 270 TABLET | Refills: 1 | Status: SHIPPED | OUTPATIENT
Start: 2022-07-06

## 2022-07-06 RX ADMIN — TRIAMCINOLONE ACETONIDE 80 MG: 40 INJECTION, SUSPENSION INTRA-ARTICULAR; INTRAMUSCULAR at 10:55

## 2022-07-06 NOTE — PROGRESS NOTES
Assessment and Plan:   47 y o  female presenting for seropositive RA follow up  Tiffanie has made her RA symptoms worse, will try Enbrel instead  Stop diclofenac, can take ibuprofen 800mg up to 3 times a day as needed for joint pain  Take famotidine twice a day if taking ibuprofen regularly to help protect stomach  Continue folic acid daily  Continue methotrexate 8 tabs once a week  Take Vit  D daily  Restart hydroxychloroquine twice a day; get regular eye exams  Will work on prior auth for Enbrel weekly injection  80mg IM Kenalog given in arm today  Do labs today    Return to clinic in 4 months    Plan:  Diagnoses and all orders for this visit:    Rheumatoid arthritis involving multiple sites with positive rheumatoid factor (HCC)  -     hydroxychloroquine (PLAQUENIL) 200 mg tablet; Take 1 tablet (200 mg total) by mouth 2 (two) times a day  -     methotrexate 2 5 MG tablet; TAKE EIGHT TABLETS BY MOUTH ONCE A WEEK  take all 8 tablets on the same day every week  -     folic acid (FOLVITE) 1 mg tablet; Take 1 tablet (1 mg total) by mouth daily  -     ibuprofen (MOTRIN) 800 mg tablet; Take 1 tablet (800 mg total) by mouth every 8 (eight) hours as needed for mild pain or moderate pain Take with food  -     Ambulatory referral to PT/OT hand therapy; Future  -     famotidine (PEPCID) 20 mg tablet; Take 1 tablet (20 mg total) by mouth 2 (two) times a day  -     triamcinolone acetonide (KENALOG-40) 40 mg/mL injection 80 mg  -     CBC and differential  -     Comprehensive metabolic panel  -     C-reactive protein  -     Sedimentation rate, automated    Osteoarthritis of left knee, unspecified osteoarthritis type    High risk medication use  -     folic acid (FOLVITE) 1 mg tablet;  Take 1 tablet (1 mg total) by mouth daily  -     CBC and differential  -     Comprehensive metabolic panel   High risk medication use - Benefits and risks of hydroxychloroquine, including but not limited to retinal toxicity, corneal deposits, gastrointestinal side effects, and headaches were previously discussed with the patient  She is aware of the need for a regular eye exam to monitor for ocular toxicity while on this medication  Benefits and risks of methotrexate use, including but not limited to gastrointestinal disturbances such as diarrhea, hair loss, fatigue, stomatitis, leukopenia, lung hypersensitivity reaction, hepatotoxicity, and lymphoma were previously discussed with the patient  Baseline viral hepatitis panel was ordered and returned negative   CBC,CMP will be regularly monitored  Patient does not plan on having any children  Patient was prescribed Folic Acid 1 mg po daily to take while on methotrexate to help prevent side effects  Patient counseled on abstinence from alcohol while using methotrexate      Benefits and risks of TNF inhibitors such as Humira discussed, and include but are not limited to leukopenia, reactivation of hepatitis B or TB, lymphoma, infusion or site reactions, exacerbation of heart failure, and increased risk of infections  A baseline CBC, CMP, Hepatitis B/C status, a CXR, and TB test will be checked  Patient will need CBC, CMP every 2 to 4 months and a CBC with infection  Follow-up plan: RTC in 4 months        Rheumatic Disease Summary:  Angeles Ryan a 47 y  o  female who originally presented on 5/13/20 via telemedicine to establish care for her newly diagnosed seropositive (RF+, anti-CCP+) Rheumatoid arthritis   Patient had migratory early RA symptoms, which significantly improved with prednisone   Asked her to continue her prednisone course prescribed by PCP    Initiated her on DMARD therapy with weight-based hydroxychloroquine 200mg po bid; asked patient to get regular eye exams while on this medication to monitor for Plaquenil-related retinal toxicity   Ophthalmology referral made per patient's request  Claude Gums chose this more benign DMARD therapy over starting methotrexate during this time in the pandemic   Ordered hand x-rays for patient to do when she gets the chance to have her baseline   Asked patient to call or message clinic with any RA flare symptoms        She presented for follow-up on 08/19/2020  At that time, her RA had not come under control with three months of hydroxychloroquine alone, so added on methotrexate 15mg po weekly with daily folic acid  Bilateral hand x-rays returned unremarkable  Since naproxen and meloxicam had not helped patient's breakthrough joint pain in the past, prescribed diclofenac 75mg po bid prn instead  Kenalog 80mg IM steroid injection administered in clinic to treat her active RA symptoms, which had been affecting her ability to work; filled out [employer] paperwork in clinic (patient works as a prison cook)     She then presented on 11/24/2020 to Dr Estella Savage for follow-up  Was having flare-ups with her right knee, and was put on prednisone p r n  at the time  No other medication changes were made      2/3/21:  Patient's rheumatoid arthritis was not coming under control with continued methotrexate 15 mg p o  weekly and hydroxychloroquine 200 mg p o  b i d  She still needed to take prednisone 10mg p r n  for knee flare ups  Decided to increase patient's methotrexate to 20 mg PO weekly with daily folic acid  She was also to continue hydroxychloroquine 200 mg p o  b i d , and would like to continue diclofenac 75 mg p o  b i d  Renewed her prednisone 10 mg p o  daily as needed while awaiting for the higher dose of methotrexate to take effect  In 2 months, planned to discuss whether advance in therapy to triple therapy with addition of sulfasalazine, or addition of a biologic agent is needed at the time  Asked patient to bring up her eye floaters issue to her eye doctor; she had a normal baseline eye exam before starting hydroxychloroquine  4/14/21: Patient mainly complains of left knee pain and swelling lately, worse when she is on her legs   Knee x-rays ordered reveal severe OA of left knee  Left knee steroid injection given in clinic, which patient tolerated well  Routine monitoring labs ordered and completed  Will continue patient on same medications, since her main symptomatic joint is likely due to her severe OA in left knee rather than RA, which was addressed to today with steroid injection  Continue methotrexate 20mg po weekly with daily folic acid and hydroxychloroquine 200mg po bid; patient is aware to get regular eye exams  Emphasized to patient that she can take the diclofenac as needed instead of regularly  For her left calf numbness, prescribed gabapentin 100mg po tid prn     7/14/21:Symptoms much improved after adding methotrexate, and performing L knee steroid injection at last visit, which was completed again today since joint pain and swelling symptoms just started returning a few days ago  Increased gabapentin to 300 mg po TID for left leg numbness; will be seeing pain management shortly to address her lumbosacral radiculopathy causing the left leg numbness  Can also use diclofenac gel as needed for painful joints  She is to continue hydroxychloroquine 200 mg p o  b i d , methotrexate 20 mg p o  weekly with folic acid 1 mg p o  daily, and diclofenac 75 mg p o  b i d  as needed for joint pain  Ordered routine monitoring labs; CRP has significantly improved  10/13/21: Recommended physical therapy for likely left knee OA as well as referral to orthopedics for evaluation for hyaluronic acid gel injections  Symptoms much improved after performing L knee steroid injection at visit prior, which was completed again this visit since joint pain began worsening recently  She was to continue methotrexate 20 mg p o  weekly with folic acid 1 mg p o  daily, and diclofenac 75 mg p o  b i d  and diclofenac gel as needed for joint pain   Discontinued hydroxychloroquine as this was not helping her symptoms previously and the methotrexate was likely what was controlling her symptoms  Discontinue gabapentin given patient's side effects to this medication  She was to continue daily Vit  D      2/16/22: Patient stated that her joint pain and morning stiffness are getting worse  Discussed with patient the benefits and risks of biological agents (anti-TNF inhibitors)  Instruction for injection  Patient is going to have her covid-19 vaccine  Continue diclofenac twice a day as needed for joint pain  Continue diclofenac gel as needed for joint pain  Continue folic acid daily  Continue methotrexate 8 tabs once a week; hold one methotrexate dose after COVID vaccine  Can take Vit  D daily  Stop hydroxychloroquine since not helping  Will work on prior auth for Humira every other week injection  Do labs before next visit  [facility] paperwork filled out    HPI   Mika Downs is a 47 y o   female who presents for RA follow up  Last clinic visit was 2/16/22  Feels severe pain everywhere; Humira has made her joint pain worse, has been on it for several months  The following portions of the patient's history were reviewed and updated as appropriate: allergies, current medications, past family history, past medical history, past social history, past surgical history and problem list     Review of Systems:   Review of Systems   Constitutional: Negative for fatigue  HENT: Negative for mouth sores  Eyes: Negative for pain  Respiratory: Negative for shortness of breath  Cardiovascular: Negative for leg swelling  Musculoskeletal: Positive for arthralgias  Negative for joint swelling  Skin: Negative for rash  Neurological: Negative for weakness  Hematological: Negative for adenopathy  Psychiatric/Behavioral: Negative for sleep disturbance         Home Medications:     Current Outpatient Medications:     cholecalciferol (VITAMIN D3) 1,000 units tablet, Take 2,000 Units by mouth, Disp: , Rfl:     famotidine (PEPCID) 20 mg tablet, Take 1 tablet (20 mg total) by mouth 2 (two) times a day, Disp: 180 tablet, Rfl: 6    folic acid (FOLVITE) 1 mg tablet, Take 1 tablet (1 mg total) by mouth daily, Disp: 90 tablet, Rfl: 1    hydroxychloroquine (PLAQUENIL) 200 mg tablet, Take 1 tablet (200 mg total) by mouth 2 (two) times a day, Disp: 180 tablet, Rfl: 1    ibuprofen (MOTRIN) 800 mg tablet, Take 1 tablet (800 mg total) by mouth every 8 (eight) hours as needed for mild pain or moderate pain Take with food, Disp: 270 tablet, Rfl: 1    methotrexate 2 5 MG tablet, TAKE EIGHT TABLETS BY MOUTH ONCE A WEEK  take all 8 tablets on the same day every week, Disp: 96 tablet, Rfl: 1    Acetaminophen (TYLENOL ARTHRITIS PAIN PO), Take by mouth   (Patient not taking: Reported on 7/6/2022), Disp: , Rfl:     Ascorbic Acid (vitamin C) 1000 MG tablet, Take 1,000 mg by mouth daily (Patient not taking: Reported on 7/6/2022), Disp: , Rfl:     magnesium gluconate (MAGONATE) 500 mg tablet, Take 500 mg by mouth daily (Patient not taking: Reported on 7/6/2022), Disp: , Rfl:     metFORMIN (GLUCOPHAGE) 500 mg tablet, Take 1 tablet (500 mg total) by mouth daily with dinner (Patient not taking: Reported on 7/6/2022), Disp: 90 tablet, Rfl: 1    multivitamin (THERAGRAN) TABS, Take 1 tablet by mouth daily (Patient not taking: Reported on 7/6/2022), Disp: , Rfl:     phentermine 30 MG capsule, Take 1 capsule (30 mg total) by mouth every morning (Patient not taking: Reported on 7/6/2022), Disp: 30 capsule, Rfl: 2    pyridoxine (B-6) 100 MG tablet, Take 100 mg by mouth (Patient not taking: Reported on 7/6/2022), Disp: , Rfl:     vitamin B-12 (VITAMIN B-12) 1,000 mcg tablet, Take by mouth daily (Patient not taking: Reported on 7/6/2022), Disp: , Rfl:     zinc gluconate 50 mg tablet, Take 50 mg by mouth daily (Patient not taking: Reported on 7/6/2022), Disp: , Rfl:     Objective:    Vitals:    07/06/22 1007   BP: 125/76   Weight: 84 8 kg (187 lb)   Height: 5' 1 5" (1 562 m)       Physical Exam  Constitutional:       General: She is not in acute distress  HENT:      Head: Normocephalic and atraumatic  Eyes:      Conjunctiva/sclera: Conjunctivae normal    Cardiovascular:      Rate and Rhythm: Normal rate and regular rhythm  Heart sounds: S1 normal and S2 normal      No friction rub  Pulmonary:      Effort: Pulmonary effort is normal  No respiratory distress  Breath sounds: Normal breath sounds  No wheezing, rhonchi or rales  Musculoskeletal:         General: Tenderness present  Cervical back: Neck supple  Skin:     Coloration: Skin is not pale  Neurological:      Mental Status: She is alert  Mental status is at baseline  Psychiatric:         Mood and Affect: Mood normal          Behavior: Behavior normal        Reviewed labs and imaging  Imaging:   XR bilateral knee 12/10/21  Right; Mild degenerative changes  Left: Moderate degenerative changes    MRI lumbar spine w wo contrast 6/30/2021  Impression: Central and left paramedian protrusion type disc herniation at L5-S1 contacts and potentially impacts the left S1 nerve root  Correlate for left S1 radiculopathy  Mild degenerative change results were described       Bilateral Knee x-rays 4/14/21  Right: unremarkable  Left: Tricompartmental osteoarthritic degenerative changes of the left knee with severe medial compartment joint space narrowing      Bilateral Hand x-rays 8/20/20: unremarkable    Labs:   Office Visit on 07/06/2022   Component Date Value Ref Range Status    WBC 07/08/2022 10 13  4 31 - 10 16 Thousand/uL Final    RBC 07/08/2022 4 13  3 81 - 5 12 Million/uL Final    Hemoglobin 07/08/2022 12 7  11 5 - 15 4 g/dL Final    Hematocrit 07/08/2022 39 2  34 8 - 46 1 % Final    MCV 07/08/2022 95  82 - 98 fL Final    MCH 07/08/2022 30 8  26 8 - 34 3 pg Final    MCHC 07/08/2022 32 4  31 4 - 37 4 g/dL Final    RDW 07/08/2022 14 7  11 6 - 15 1 % Final    MPV 07/08/2022 9 0  8 9 - 12 7 fL Final    Platelets 58/87/0835 394 (A) 149 - 390 Thousands/uL Final  nRBC 07/08/2022 0  /100 WBCs Final    Neutrophils Relative 07/08/2022 65  43 - 75 % Final    Immat GRANS % 07/08/2022 1  0 - 2 % Final    Lymphocytes Relative 07/08/2022 21  14 - 44 % Final    Monocytes Relative 07/08/2022 10  4 - 12 % Final    Eosinophils Relative 07/08/2022 2  0 - 6 % Final    Basophils Relative 07/08/2022 1  0 - 1 % Final    Neutrophils Absolute 07/08/2022 6 72  1 85 - 7 62 Thousands/µL Final    Immature Grans Absolute 07/08/2022 0 05  0 00 - 0 20 Thousand/uL Final    Lymphocytes Absolute 07/08/2022 2 13  0 60 - 4 47 Thousands/µL Final    Monocytes Absolute 07/08/2022 0 99  0 17 - 1 22 Thousand/µL Final    Eosinophils Absolute 07/08/2022 0 17  0 00 - 0 61 Thousand/µL Final    Basophils Absolute 07/08/2022 0 07  0 00 - 0 10 Thousands/µL Final    Sodium 07/08/2022 139  135 - 147 mmol/L Final    Potassium 07/08/2022 4 8  3 5 - 5 3 mmol/L Final    Chloride 07/08/2022 107  96 - 108 mmol/L Final    CO2 07/08/2022 28  21 - 32 mmol/L Final    ANION GAP 07/08/2022 4  4 - 13 mmol/L Final    BUN 07/08/2022 23  5 - 25 mg/dL Final    Creatinine 07/08/2022 0 64  0 60 - 1 30 mg/dL Final    Standardized to IDMS reference method    Glucose, Fasting 07/08/2022 85  65 - 99 mg/dL Final    Specimen collection should occur prior to Sulfasalazine administration due to the potential for falsely depressed results  Specimen collection should occur prior to Sulfapyridine administration due to the potential for falsely elevated results   Calcium 07/08/2022 9 2  8 4 - 10 2 mg/dL Final    AST 07/08/2022 20  13 - 39 U/L Final    Specimen collection should occur prior to Sulfasalazine administration due to the potential for falsely depressed results   ALT 07/08/2022 67 (A) 7 - 52 U/L Final    Specimen collection should occur prior to Sulfasalazine administration due to the potential for falsely depressed results       Alkaline Phosphatase 07/08/2022 59  34 - 104 U/L Final    Total Protein 07/08/2022 7 5  6 4 - 8 4 g/dL Final    Albumin 07/08/2022 4 2  3 5 - 5 0 g/dL Final    Total Bilirubin 07/08/2022 0 37  0 20 - 1 00 mg/dL Final    eGFR 07/08/2022 102  ml/min/1 73sq m Final    CRP 07/08/2022 <1 0  <3 0 mg/L Final    Sed Rate 07/08/2022 15  0 - 29 mm/hour Final   Telephone on 02/22/2022   Component Date Value Ref Range Status    QFT Nil 02/23/2022 0 02  0 - 8 0 IU/ml Final    QFT TB1-NIL 02/23/2022 0 00  IU/ml Final    QFT TB2-NIL 02/23/2022 0 01  IU/ml Final    QFT Mitogen-NIL 02/23/2022 6 09  IU/ml Final    QFT Final Interpretation 02/23/2022 Negative  Negative Final    No Interferon-gamma response to M  tuberculosis antigens detected  Infection with M  tuberculosis is unlikely  A single negative result does not exclude infection with M  tuberculosis  In patients at high risk for M  tuberculosis infection, a second test should be considered in accordance with the 2017 ATS/IDSA/CDC Clinical Practice Guidelines for Diagnosis of Tuberculosis in Adults and Children  False negative results can be a result of incorrect blood sample collection or handling of the specimen affecting lymphocyte function      Hepatitis B Surface Ag 02/23/2022 Non-reactive  Non-reactive, NonReactive - Confirmed Final    Hepatitis C Ab 02/23/2022 Non-reactive  Non-reactive Final    Hep B C IgM 02/23/2022 Non-reactive  Non-reactive Final    Hep B Core Total Ab 02/23/2022 Non-reactive  Non-reactive Final   Office Visit on 02/16/2022   Component Date Value Ref Range Status    WBC 02/23/2022 6 70  4 31 - 10 16 Thousand/uL Final    RBC 02/23/2022 4 04  3 81 - 5 12 Million/uL Final    Hemoglobin 02/23/2022 12 2  11 5 - 15 4 g/dL Final    Hematocrit 02/23/2022 37 3  34 8 - 46 1 % Final    MCV 02/23/2022 92  82 - 98 fL Final    MCH 02/23/2022 30 2  26 8 - 34 3 pg Final    MCHC 02/23/2022 32 7  31 4 - 37 4 g/dL Final    RDW 02/23/2022 12 8  11 6 - 15 1 % Final    MPV 02/23/2022 9 0  8 9 - 12 7 fL Final    Platelets 60/44/0435 342  149 - 390 Thousands/uL Final    Sodium 02/23/2022 138  136 - 145 mmol/L Final    Potassium 02/23/2022 5 1  3 5 - 5 3 mmol/L Final    Chloride 02/23/2022 105  100 - 108 mmol/L Final    CO2 02/23/2022 24  21 - 32 mmol/L Final    ANION GAP 02/23/2022 9  4 - 13 mmol/L Final    BUN 02/23/2022 16  5 - 25 mg/dL Final    Creatinine 02/23/2022 0 70  0 60 - 1 30 mg/dL Final    Standardized to IDMS reference method    Glucose 02/23/2022 84  65 - 140 mg/dL Final    If the patient is fasting, the ADA then defines impaired fasting glucose as > 100 mg/dL and diabetes as > or equal to 123 mg/dL  Specimen collection should occur prior to Sulfasalazine administration due to the potential for falsely depressed results  Specimen collection should occur prior to Sulfapyridine administration due to the potential for falsely elevated results   Calcium 02/23/2022 9 4  8 3 - 10 1 mg/dL Final    AST 02/23/2022 28  5 - 45 U/L Final    Specimen collection should occur prior to Sulfasalazine administration due to the potential for falsely depressed results   ALT 02/23/2022 51  12 - 78 U/L Final    Specimen collection should occur prior to Sulfasalazine administration due to the potential for falsely depressed results   Alkaline Phosphatase 02/23/2022 68  46 - 116 U/L Final    Total Protein 02/23/2022 7 7  6 4 - 8 2 g/dL Final    Albumin 02/23/2022 3 7  3 5 - 5 0 g/dL Final    Total Bilirubin 02/23/2022 0 33  0 20 - 1 00 mg/dL Final    Use of this assay is not recommended for patients undergoing treatment with eltrombopag due to the potential for falsely elevated results      eGFR 02/23/2022 99  ml/min/1 73sq m Final    CRP 02/23/2022 5 4 (A) <3 0 mg/L Final    Sed Rate 02/23/2022 10  0 - 29 mm/hour Final    Segmented % 02/23/2022 56  43 - 75 % Final    Lymphocytes % 02/23/2022 37  14 - 44 % Final    Monocytes % 02/23/2022 4  4 - 12 % Final    Eosinophils, % 02/23/2022 1  0 - 6 % Final    Basophils % 02/23/2022 2 (A) 0 - 1 % Final    Absolute Neutrophils 02/23/2022 3 75  1 85 - 7 62 Thousand/uL Final    Lymphocytes Absolute 02/23/2022 2 48  0 60 - 4 47 Thousand/uL Final    Monocytes Absolute 02/23/2022 0 27  0 00 - 1 22 Thousand/uL Final    Eosinophils Absolute 02/23/2022 0 07  0 00 - 0 40 Thousand/uL Final    Basophils Absolute 02/23/2022 0 13 (A) 0 00 - 0 10 Thousand/uL Final    RBC Morphology 02/23/2022 Normal   Final    Platelet Estimate 65/71/3315 Adequate  Adequate Final   Lab on 02/10/2022   Component Date Value Ref Range Status    Cholesterol 02/10/2022 236 (A) See Comment mg/dL Final    Cholesterol:         Pediatric <18 Years        Desirable          <170 mg/dL      Borderline High    170-199 mg/dL      High               >=200 mg/dL        Adult >=18 Years            Desirable         <200 mg/dL      Borderline High   200-239 mg/dL      High              >239 mg/dL      Triglycerides 02/10/2022 140  See Comment mg/dL Final    Triglyceride:     0-9Y            <75mg/dL     10Y-17Y         <90 mg/dL       >=18Y     Normal          <150 mg/dL     Borderline High 150-199 mg/dL     High            200-499 mg/dL        Very High       >499 mg/dL    Specimen collection should occur prior to N-Acetylcysteine or Metamizole administration due to the potential for falsely depressed results   HDL, Direct 02/10/2022 64  >=50 mg/dL Final    Specimen collection should occur prior to Metamizole administration due to the potential for falsley depressed results   LDL Calculated 02/10/2022 144 (A) 0 - 100 mg/dL Final    LDL Cholesterol:     Optimal           <100 mg/dl     Near Optimal      100-129 mg/dl     Above Optimal       Borderline High 130-159 mg/dl       High            160-189 mg/dl       Very High       >189 mg/dl         This screening LDL is a calculated result     It does not have the accuracy of the Direct Measured LDL in the monitoring of patients with hyperlipidemia and/or statin therapy  Direct Measure LDL ([id number]) must be ordered separately in these patients   TSH 3RD GENERATON 02/10/2022 2  156  0 358 - 3 740 uIU/mL Final    The recommended reference ranges for TSH during pregnancy are as follows:   First trimester 0 1 to 2 5 uIU/mL   Second trimester  0 2 to 3 0 uIU/mL   Third trimester 0 3 to 3 0 uIU/m    Note: Normal ranges may not apply to patients who are transgender, non-binary, or whose legal sex, sex at birth, and gender identity differ      Vit D, 25-Hydroxy 02/10/2022 72 8  30 0 - 100 0 ng/mL Final   Orders Only on 02/03/2022   Component Date Value Ref Range Status    WBC 02/10/2022 6 12  4 31 - 10 16 Thousand/uL Final    RBC 02/10/2022 4 19  3 81 - 5 12 Million/uL Final    Hemoglobin 02/10/2022 12 7  11 5 - 15 4 g/dL Final    Hematocrit 02/10/2022 39 2  34 8 - 46 1 % Final    MCV 02/10/2022 94  82 - 98 fL Final    MCH 02/10/2022 30 3  26 8 - 34 3 pg Final    MCHC 02/10/2022 32 4  31 4 - 37 4 g/dL Final    RDW 02/10/2022 13 0  11 6 - 15 1 % Final    MPV 02/10/2022 8 8 (A) 8 9 - 12 7 fL Final    Platelets 51/17/8140 351  149 - 390 Thousands/uL Final    nRBC 02/10/2022 0  /100 WBCs Final    Neutrophils Relative 02/10/2022 55  43 - 75 % Final    Immat GRANS % 02/10/2022 0  0 - 2 % Final    Lymphocytes Relative 02/10/2022 31  14 - 44 % Final    Monocytes Relative 02/10/2022 10  4 - 12 % Final    Eosinophils Relative 02/10/2022 3  0 - 6 % Final    Basophils Relative 02/10/2022 1  0 - 1 % Final    Neutrophils Absolute 02/10/2022 3 42  1 85 - 7 62 Thousands/µL Final    Immature Grans Absolute 02/10/2022 0 01  0 00 - 0 20 Thousand/uL Final    Lymphocytes Absolute 02/10/2022 1 88  0 60 - 4 47 Thousands/µL Final    Monocytes Absolute 02/10/2022 0 61  0 17 - 1 22 Thousand/µL Final    Eosinophils Absolute 02/10/2022 0 16  0 00 - 0 61 Thousand/µL Final    Basophils Absolute 02/10/2022 0 04  0 00 - 0 10 Thousands/µL Final    Sodium 02/10/2022 139  136 - 145 mmol/L Final    Potassium 02/10/2022 4 1  3 5 - 5 3 mmol/L Final    Chloride 02/10/2022 103  100 - 108 mmol/L Final    CO2 02/10/2022 27  21 - 32 mmol/L Final    ANION GAP 02/10/2022 9  4 - 13 mmol/L Final    BUN 02/10/2022 17  5 - 25 mg/dL Final    Creatinine 02/10/2022 0 75  0 60 - 1 30 mg/dL Final    Standardized to IDMS reference method    Glucose, Fasting 02/10/2022 82  65 - 99 mg/dL Final    Specimen collection should occur prior to Sulfasalazine administration due to the potential for falsely depressed results  Specimen collection should occur prior to Sulfapyridine administration due to the potential for falsely elevated results   Calcium 02/10/2022 9 0  8 3 - 10 1 mg/dL Final    AST 02/10/2022 22  5 - 45 U/L Final    Specimen collection should occur prior to Sulfasalazine administration due to the potential for falsely depressed results   ALT 02/10/2022 55  12 - 78 U/L Final    Specimen collection should occur prior to Sulfasalazine administration due to the potential for falsely depressed results   Alkaline Phosphatase 02/10/2022 62  46 - 116 U/L Final    Total Protein 02/10/2022 7 4  6 4 - 8 2 g/dL Final    Albumin 02/10/2022 3 8  3 5 - 5 0 g/dL Final    Total Bilirubin 02/10/2022 0 27  0 20 - 1 00 mg/dL Final    Use of this assay is not recommended for patients undergoing treatment with eltrombopag due to the potential for falsely elevated results      eGFR 02/10/2022 91  ml/min/1 73sq m Final    CRP 02/10/2022 <3 0  <3 0 mg/L Final    Sed Rate 02/10/2022 6  0 - 29 mm/hour Final

## 2022-07-06 NOTE — PATIENT INSTRUCTIONS
Stop diclofenac, can take ibuprofen 800mg up to 3 times a day as needed for joint pain  Take famotidine twice a day if taking ibuprofen regularly to help protect stomach  Continue folic acid daily  Continue methotrexate 8 tabs once a week  Take Vit   D daily  Restart hydroxychloroquine twice a day; get regular eye exams  Will work on prior auth for Enbrel weekly injection  Steroid injection given in arm today  Do labs today    Return to clinic in 4 months

## 2022-07-08 ENCOUNTER — APPOINTMENT (OUTPATIENT)
Dept: LAB | Facility: CLINIC | Age: 52
End: 2022-07-08
Payer: COMMERCIAL

## 2022-07-08 DIAGNOSIS — E78.5 MILD HYPERLIPIDEMIA: ICD-10-CM

## 2022-07-08 DIAGNOSIS — Z13.6 SCREENING FOR CARDIOVASCULAR CONDITION: ICD-10-CM

## 2022-07-08 DIAGNOSIS — Z13.1 SCREENING FOR DIABETES MELLITUS: ICD-10-CM

## 2022-07-08 LAB
ALBUMIN SERPL BCP-MCNC: 4.2 G/DL (ref 3.5–5)
ALP SERPL-CCNC: 59 U/L (ref 34–104)
ALT SERPL W P-5'-P-CCNC: 67 U/L (ref 7–52)
ANION GAP SERPL CALCULATED.3IONS-SCNC: 4 MMOL/L (ref 4–13)
AST SERPL W P-5'-P-CCNC: 20 U/L (ref 13–39)
BASOPHILS # BLD AUTO: 0.07 THOUSANDS/ΜL (ref 0–0.1)
BASOPHILS NFR BLD AUTO: 1 % (ref 0–1)
BILIRUB SERPL-MCNC: 0.37 MG/DL (ref 0.2–1)
BUN SERPL-MCNC: 23 MG/DL (ref 5–25)
CALCIUM SERPL-MCNC: 9.2 MG/DL (ref 8.4–10.2)
CHLORIDE SERPL-SCNC: 107 MMOL/L (ref 96–108)
CHOLEST SERPL-MCNC: 236 MG/DL
CO2 SERPL-SCNC: 28 MMOL/L (ref 21–32)
CREAT SERPL-MCNC: 0.64 MG/DL (ref 0.6–1.3)
CRP SERPL QL: <1 MG/L
EOSINOPHIL # BLD AUTO: 0.17 THOUSAND/ΜL (ref 0–0.61)
EOSINOPHIL NFR BLD AUTO: 2 % (ref 0–6)
ERYTHROCYTE [DISTWIDTH] IN BLOOD BY AUTOMATED COUNT: 14.7 % (ref 11.6–15.1)
ERYTHROCYTE [SEDIMENTATION RATE] IN BLOOD: 15 MM/HOUR (ref 0–29)
GFR SERPL CREATININE-BSD FRML MDRD: 102 ML/MIN/1.73SQ M
GLUCOSE P FAST SERPL-MCNC: 85 MG/DL (ref 65–99)
HCT VFR BLD AUTO: 39.2 % (ref 34.8–46.1)
HDLC SERPL-MCNC: 68 MG/DL
HGB BLD-MCNC: 12.7 G/DL (ref 11.5–15.4)
IMM GRANULOCYTES # BLD AUTO: 0.05 THOUSAND/UL (ref 0–0.2)
IMM GRANULOCYTES NFR BLD AUTO: 1 % (ref 0–2)
LDLC SERPL CALC-MCNC: 140 MG/DL (ref 0–100)
LYMPHOCYTES # BLD AUTO: 2.13 THOUSANDS/ΜL (ref 0.6–4.47)
LYMPHOCYTES NFR BLD AUTO: 21 % (ref 14–44)
MCH RBC QN AUTO: 30.8 PG (ref 26.8–34.3)
MCHC RBC AUTO-ENTMCNC: 32.4 G/DL (ref 31.4–37.4)
MCV RBC AUTO: 95 FL (ref 82–98)
MONOCYTES # BLD AUTO: 0.99 THOUSAND/ΜL (ref 0.17–1.22)
MONOCYTES NFR BLD AUTO: 10 % (ref 4–12)
NEUTROPHILS # BLD AUTO: 6.72 THOUSANDS/ΜL (ref 1.85–7.62)
NEUTS SEG NFR BLD AUTO: 65 % (ref 43–75)
NRBC BLD AUTO-RTO: 0 /100 WBCS
PLATELET # BLD AUTO: 394 THOUSANDS/UL (ref 149–390)
PMV BLD AUTO: 9 FL (ref 8.9–12.7)
POTASSIUM SERPL-SCNC: 4.8 MMOL/L (ref 3.5–5.3)
PROT SERPL-MCNC: 7.5 G/DL (ref 6.4–8.4)
RBC # BLD AUTO: 4.13 MILLION/UL (ref 3.81–5.12)
SODIUM SERPL-SCNC: 139 MMOL/L (ref 135–147)
TRIGL SERPL-MCNC: 139 MG/DL
WBC # BLD AUTO: 10.13 THOUSAND/UL (ref 4.31–10.16)

## 2022-07-08 PROCEDURE — 85652 RBC SED RATE AUTOMATED: CPT | Performed by: INTERNAL MEDICINE

## 2022-07-08 PROCEDURE — 86140 C-REACTIVE PROTEIN: CPT | Performed by: INTERNAL MEDICINE

## 2022-07-08 PROCEDURE — 36415 COLL VENOUS BLD VENIPUNCTURE: CPT | Performed by: INTERNAL MEDICINE

## 2022-07-08 PROCEDURE — 85025 COMPLETE CBC W/AUTO DIFF WBC: CPT | Performed by: INTERNAL MEDICINE

## 2022-07-08 PROCEDURE — 80053 COMPREHEN METABOLIC PANEL: CPT | Performed by: INTERNAL MEDICINE

## 2022-07-08 PROCEDURE — 80061 LIPID PANEL: CPT

## 2022-07-11 ENCOUNTER — TELEPHONE (OUTPATIENT)
Dept: RHEUMATOLOGY | Facility: CLINIC | Age: 52
End: 2022-07-11

## 2022-07-11 NOTE — TELEPHONE ENCOUNTER
Unfortunately Enbrel is being denied because Giovanni Morales has not tried Actemra or Orencia? ? Please advise

## 2022-07-11 NOTE — TELEPHONE ENCOUNTER
Please work on prior authorization for patient's Enbrel 50mg weekly pen injections for her seropositive rheumatoid arthritis  She has tried and failed hydroxychloroquine and methotrexate, and lately Humira  Has recent negative viral hepatitis panel and TB test on file  Is up to date on her vaccinations and has no active infections  Let me know once approved so I can send the prescription to her specialty pharmacy

## 2022-07-12 NOTE — TELEPHONE ENCOUNTER
Please proceed with prior auth for weekly Orencia pen injection instead using same info used for Enbrel prior auth; please update pt that we're trying for this medication instead (since apparently insurance companies no better what she should try next than doctors ;) )

## 2022-07-14 DIAGNOSIS — M05.79 RHEUMATOID ARTHRITIS INVOLVING MULTIPLE SITES WITH POSITIVE RHEUMATOID FACTOR (HCC): Primary | ICD-10-CM

## 2022-07-14 RX ORDER — ABATACEPT 125 MG/ML
INJECTION, SOLUTION SUBCUTANEOUS
Qty: 4 ML | Refills: 11 | Status: SHIPPED | OUTPATIENT
Start: 2022-07-14

## 2022-07-14 NOTE — TELEPHONE ENCOUNTER
Brianne Harrell has been approved from 07/12/22 through 07/12/23   Please send RX to [facility], thanks

## 2022-07-22 ENCOUNTER — VBI (OUTPATIENT)
Dept: ADMINISTRATIVE | Facility: OTHER | Age: 52
End: 2022-07-22

## 2022-10-14 ENCOUNTER — NURSE TRIAGE (OUTPATIENT)
Dept: OTHER | Facility: OTHER | Age: 52
End: 2022-10-14

## 2022-10-14 NOTE — TELEPHONE ENCOUNTER
Regarding: [facility] - COVID - possible postive concerns   ----- Message from Jorden Frye sent at 10/14/2022  4:15 PM EDT -----  " I havent been feeling well I took a Covid test and I really can tell if the results are positive  I see a second line and I don't know if this is a positive reading  I feel very wiped out and I am concerned  '' I have a  to attend tomorrow"

## 2022-10-14 NOTE — TELEPHONE ENCOUNTER
Reason for Disposition  • [1] COVID-19 diagnosed by positive lab test (e g , PCR, rapid self-test kit) AND [2] mild symptoms (e g , cough, fever, others) AND [7] no complications or SOB    Answer Assessment - Initial Assessment Questions  Were you within 6 feet or less, for up to 15 minutes or more with a person that has a confirmed COVID-19 test? Co-workers with covid  What was the date of your exposure? unsure  Are you experiencing any symptoms attributed to the virus?  (Assess for SOB, cough, fever, difficulty breathing) dry cough, vomiting, fatigue, sneezing    Protocols used: CORONAVIRUS (COVID-19) DIAGNOSED OR SUSPECTED-ADULT- 89 y/o F presents to the ED via EMS c/o fevers.   Patient was recently d/c home from hospital for kidney stones and UTI.  Pt was d/c home with antibiotics, but aid doesn't remember which antibiotic.  Pt has been taking antibiotic x2 days.  Aid at bedside states the patient normally has mild dementia, but is normally functions well and is able to hold conversations.  Aid states today the patient hasn't  been herself today and is barely speaking.  Aid states the pt is just starting up at the ceiling and will sometimes make weird sounds.  In ED pt is awake, responds to pain, but continues to be mute.  Patient rectal temp in .6.  Pt st cath as per MD Cain verbal order.  Pt placed on cardiac monitor, EKG performed NSR and unchanged from previous EKG.  VSS.        stage 2 middle saccrum. 89 y/o F presents to the ED via EMS c/o fevers.   Patient was recently d/c home from hospital for kidney stones and UTI.  Pt was d/c home with antibiotics, but aid doesn't remember which antibiotic.  Pt has been taking antibiotic x2 days.  Aid at bedside states the patient normally has mild dementia, but is normally functions well and is able to hold conversations.  Aid states today the patient hasn't  been herself today and is barely speaking.  Aid states the pt is just starting up at the ceiling and will sometimes make weird sounds.  In ED pt is awake, responds to pain, but continues to be mute.  Patient rectal temp in .6.  Pt st cath as per MD Cain verbal order.  Pt placed on cardiac monitor, EKG performed NSR and unchanged from previous EKG.  VSS.  Pt has a stage 2 pressure ulcer on middle saccrum with surrounding redness, MD Salina nguyen aware

## 2022-10-14 NOTE — TELEPHONE ENCOUNTER
Patient reports starting with a dry cough and sneezing about 2-3 days ago  Had an episode of  Vomiting, extreme fatigue today  Took a home covid test  She noted a very faint second line on the test  Care advice given   Denies any SOB or CP

## 2022-11-06 DIAGNOSIS — M05.79 RHEUMATOID ARTHRITIS INVOLVING MULTIPLE SITES WITH POSITIVE RHEUMATOID FACTOR (HCC): ICD-10-CM

## 2022-11-07 RX ORDER — IBUPROFEN 800 MG/1
TABLET ORAL
Qty: 270 TABLET | Refills: 3 | Status: SHIPPED | OUTPATIENT
Start: 2022-11-07

## 2022-11-16 ENCOUNTER — VBI (OUTPATIENT)
Dept: ADMINISTRATIVE | Facility: OTHER | Age: 52
End: 2022-11-16

## 2022-11-25 ENCOUNTER — TELEMEDICINE (OUTPATIENT)
Dept: FAMILY MEDICINE CLINIC | Facility: CLINIC | Age: 52
End: 2022-11-25

## 2022-11-25 DIAGNOSIS — A08.4 VIRAL GASTROENTERITIS: Primary | ICD-10-CM

## 2022-11-25 NOTE — PROGRESS NOTES
COVID-19 Outpatient Progress Note    Assessment/Plan:    Problem List Items Addressed This Visit    None  Visit Diagnoses     Viral gastroenteritis    -  Primary    Stable on exam; likely VGE  [place] diet x 2-3 days, advance slowly; good hydration  Precautions given  OTC Imodium PRN recommended  Precautions  Disposition:     After clarifying the patient's history, my suspicion for COVID-19 infection is very low  Doubt here COV-19 or Flu clinically  I have spent 15 minutes directly with the patient  Greater than 50% of this time was spent in counseling/coordination of care regarding: diagnostic results, prognosis, risks and benefits of treatment options, instructions for management, patient and family education, importance of treatment compliance, risk factor reductions and impressions  Encounter provider: Josesito Douglas DO     Provider located at: [address] [address]     Recent Visits  No visits were found meeting these conditions  Showing recent visits within past 7 days and meeting all other requirements  Today's Visits  Date Type Provider Dept   11/25/22 Telemedicine DO Fazal Pickering   Showing today's visits and meeting all other requirements  Future Appointments  No visits were found meeting these conditions  Showing future appointments within next 150 days and meeting all other requirements     This virtual check-in was done via [address] and patient was informed that this is a secure, HIPAA-compliant platform  She agrees to proceed  Patient agrees to participate in a virtual check in via telephone or video visit instead of presenting to the office to address urgent/immediate medical needs  Patient is aware this is a billable service  She acknowledged consent and understanding of privacy and security of the video platform   The patient has agreed to participate and understands they can discontinue the visit at any time  After connecting through [facility], the patient was identified by name and date of birth  Dilma Toribio was informed that this was a telemedicine visit and that the exam was being conducted confidentially over secure lines  My office door was closed  No one else was in the room  Dilma Toribio acknowledged consent and understanding of privacy and security of the telemedicine visit  I informed the patient that I have reviewed her record in Epic and presented the opportunity for her to ask any questions regarding the visit today  The patient agreed to participate  Verification of patient location:  Patient is located in the following state in which I hold an active license: [place]    Subjective:   Dilma Toribio is a 47 y o  female who is concerned about COVID-19  Patient's symptoms include nasal congestion, cough and diarrhea  Patient denies fever and nausea  - Date of symptom onset: 11/21/2022      COVID-19 vaccination status: Partially vaccinated    Exposure:   Contact with a person who is under investigation (PUI) for or who is positive for COVID-19 within the last 14 days?: No    Hospitalized recently for fever and/or lower respiratory symptoms?: No      Currently a healthcare worker that is involved in direct patient care?: No      Works in a special setting where the risk of COVID-19 transmission may be high? (this may include long-term care, correctional and intermediate facilities; homeless shelters; assisted-living facilities and group homes ): No      Resident in a special setting where the risk of COVID-19 transmission may be high? (this may include long-term care, correctional and intermediate facilities; homeless shelters; assisted-living facilities and group homes ): No      +Slight cough  Everything that she eats, needs to have an extremely loose BM  At home COV-19 testing negative        Lab Results   Component Value Date    SARSCOV2 Positive (A) 03/08/2021    SARSCOV2 Not Detected 10/22/2020       Review of Systems   Constitutional: Negative for activity change and fever  HENT: Positive for congestion  Respiratory: Positive for cough  Gastrointestinal: Positive for diarrhea  Negative for blood in stool and nausea       Current Outpatient Medications on File Prior to Visit   Medication Sig   • Abatacept (Orencia ClickJect) 720 MG/ML SOAJ Inject 125 mg SC weekly   • cholecalciferol (VITAMIN D3) 1,000 units tablet Take 2,000 Units by mouth   • famotidine (PEPCID) 20 mg tablet Take 1 tablet (20 mg total) by mouth 2 (two) times a day   • folic acid (FOLVITE) 1 mg tablet Take 1 tablet (1 mg total) by mouth daily   • hydroxychloroquine (PLAQUENIL) 200 mg tablet Take 1 tablet (200 mg total) by mouth 2 (two) times a day   • ibuprofen (MOTRIN) 800 mg tablet TAKE 1 TABLET BY MOUTH  EVERY 8 HOURS AS NEEDED FOR MILD PAIN OR MODERATE PAIN  TAKE WITH FOOD   • methotrexate 2 5 MG tablet TAKE EIGHT TABLETS BY MOUTH ONCE A WEEK  take all 8 tablets on the same day every week   • Acetaminophen (TYLENOL ARTHRITIS PAIN PO) Take by mouth   (Patient not taking: Reported on 7/6/2022)   • Ascorbic Acid (vitamin C) 1000 MG tablet Take 1,000 mg by mouth daily (Patient not taking: Reported on 7/6/2022)   • magnesium gluconate (MAGONATE) 500 mg tablet Take 500 mg by mouth daily (Patient not taking: Reported on 7/6/2022)   • metFORMIN (GLUCOPHAGE) 500 mg tablet Take 1 tablet (500 mg total) by mouth daily with dinner (Patient not taking: Reported on 7/6/2022)   • multivitamin (THERAGRAN) TABS Take 1 tablet by mouth daily (Patient not taking: Reported on 7/6/2022)   • phentermine 30 MG capsule Take 1 capsule (30 mg total) by mouth every morning (Patient not taking: Reported on 7/6/2022)   • pyridoxine (B-6) 100 MG tablet Take 100 mg by mouth (Patient not taking: Reported on 7/6/2022)   • vitamin B-12 (VITAMIN B-12) 1,000 mcg tablet Take by mouth daily (Patient not taking: Reported on 7/6/2022)   • zinc gluconate 50 mg tablet Take 50 mg by mouth daily (Patient not taking: Reported on 7/6/2022)       Objective: There were no vitals taken for this visit  Physical Exam  Constitutional:       General: She is not in acute distress  Appearance: Normal appearance  She is not ill-appearing, toxic-appearing or diaphoretic  Comments: Xenia Lau appears tired, but non-toxic appearing today; she is speaking in full sentences  HENT:      Head: Normocephalic and atraumatic  Eyes:      General: No scleral icterus  Conjunctiva/sclera: Conjunctivae normal    Pulmonary:      Effort: Pulmonary effort is normal  No respiratory distress  Neurological:      Mental Status: She is alert and oriented to person, place, and time  Psychiatric:         Mood and Affect: Mood normal          Behavior: Behavior normal          Thought Content: Thought content normal          Judgment: Judgment normal       Alison was seen today for cough, nasal congestion and covid-19  Diagnoses and all orders for this visit:    Viral gastroenteritis  Comments:  Stable on exam; likely VGE  [place] diet x 2-3 days, advance slowly; good hydration  Precautions given  OTC Imodium PRN recommended  Precautions  Xenia Lau was seen today for cough, nasal congestion and covid-19  Diagnoses and all orders for this visit:    Viral gastroenteritis  Comments:  Stable on exam; likely VGE  [place] diet x 2-3 days, advance slowly; good hydration  Precautions given  OTC Imodium PRN recommended  Precautions          [address] Choi DO

## 2022-12-04 DIAGNOSIS — M05.79 RHEUMATOID ARTHRITIS INVOLVING MULTIPLE SITES WITH POSITIVE RHEUMATOID FACTOR (HCC): ICD-10-CM

## 2022-12-05 NOTE — PROGRESS NOTES
Assessment and Plan:   47 y o  female presenting for seropositive RA follow up  Patient's rheumatoid arthritis is not controlled on her current regimen  She has been having full body flareups  Is up-to-date on her eye exams  Admits to stiffness all day  [place] has not been any better than Humira  Is willing to try Actemra  Continue ibuprofen 800mg up to 3 times a day as needed for joint pain  Continue famotidine twice a day if taking ibuprofen regularly to help protect stomach  Continue folic acid daily  Continue methotrexate 8 tabs once a week  Continue Vit  D daily  Continue hydroxychloroquine twice a day; get regular eye exams  Will work on prior auth for Actemra weekly injections instead of Orencia  Take prednisone taper during a flare-up  Do labs today     Return to clinic in 6 months     Plan:  Diagnoses and all orders for this visit:    Rheumatoid arthritis involving multiple sites with positive rheumatoid factor (HCC)  -     CBC and differential  -     Comprehensive metabolic panel  -     Sedimentation rate, automated  -     C-reactive protein  -     CK  -     Aldolase  -     Discontinue: predniSONE 10 mg tablet; Take 2 tablets (20 mg total) by mouth daily for 7 days, THEN 1 tablet (10 mg total) daily for 7 days  -     predniSONE 10 mg tablet; Take 2 tablets (20 mg total) by mouth daily for 7 days, THEN 1 tablet (10 mg total) daily for 7 days  -     folic acid (FOLVITE) 1 mg tablet; Take 1 tablet (1 mg total) by mouth daily  -     famotidine (PEPCID) 20 mg tablet; Take 1 tablet (20 mg total) by mouth 2 (two) times a day  -     methotrexate 2 5 MG tablet; TAKE EIGHT TABLETS BY MOUTH ONCE A WEEK  take all 8 tablets on the same day every week    Osteoarthritis of left knee, unspecified osteoarthritis type    High risk medication use  -     Quantiferon TB Gold Plus  -     Chronic Hepatitis Panel  -     folic acid (FOLVITE) 1 mg tablet;  Take 1 tablet (1 mg total) by mouth daily   High risk medication use - Benefits and risks of hydroxychloroquine, including but not limited to retinal toxicity, corneal deposits, gastrointestinal side effects, and headaches were previously discussed with the patient  She is aware of the need for a regular eye exam to monitor for ocular toxicity while on this medication  Benefits and risks of methotrexate use, including but not limited to gastrointestinal disturbances such as diarrhea, hair loss, fatigue, stomatitis, leukopenia, lung hypersensitivity reaction, hepatotoxicity, and lymphoma were previously discussed with the patient  Baseline viral hepatitis panel was ordered and returned negative   CBC,CMP will be regularly monitored  Patient does not plan on having any children  Patient was prescribed Folic Acid 1 mg po daily to take while on methotrexate to help prevent side effects  Patient counseled on abstinence from alcohol while using methotrexate      Benefits and risks of biologics such as Actemra discussed, and include but are not limited to leukopenia, reactivation of hepatitis B/C or TB, infusion or site reactions, and increased risk of infections  A baseline CBC, CMP, Hepatitis B/C status, and TB test will be checked  Patient will need CBC, CMP monitoring regularly  Follow-up plan: RTC in 6 months        Rheumatic Disease Summary:  Marco A Abreu a 47 y  o  female who originally presented on 5/13/20 via telemedicine to establish care for her newly diagnosed seropositive (RF+, anti-CCP+) Rheumatoid arthritis   Patient had migratory early RA symptoms, which significantly improved with prednisone   Asked her to continue her prednisone course prescribed by PCP    Initiated her on DMARD therapy with weight-based hydroxychloroquine 200mg po bid; asked patient to get regular eye exams while on this medication to monitor for Plaquenil-related retinal toxicity   Ophthalmology referral made per patient's request  Joni Pruitt chose this more benign DMARD therapy over starting methotrexate during this time in the pandemic   Ordered hand x-rays for patient to do when she gets the chance to have her baseline   Asked patient to call or message clinic with any RA flare symptoms        She presented for follow-up on 08/19/2020  At that time, her RA had not come under control with three months of hydroxychloroquine alone, so added on methotrexate 15mg po weekly with daily folic acid  Bilateral hand x-rays returned unremarkable  Since naproxen and meloxicam had not helped patient's breakthrough joint pain in the past, prescribed diclofenac 75mg po bid prn instead  Kenalog 80mg IM steroid injection administered in clinic to treat her active RA symptoms, which had been affecting her ability to work; filled out [employer] paperwork in clinic (patient works as a prison cook)     She then presented on 11/24/2020 to Dr Roberto Aldana for follow-up  Was having flare-ups with her right knee, and was put on prednisone p r n  at the time  No other medication changes were made      2/3/21:  Patient's rheumatoid arthritis was not coming under control with continued methotrexate 15 mg p o  weekly and hydroxychloroquine 200 mg p o  b i d  She still needed to take prednisone 10mg p r n  for knee flare ups  Decided to increase patient's methotrexate to 20 mg PO weekly with daily folic acid  She was also to continue hydroxychloroquine 200 mg p o  b i d , and would like to continue diclofenac 75 mg p o  b i d  Renewed her prednisone 10 mg p o  daily as needed while awaiting for the higher dose of methotrexate to take effect  In 2 months, planned to discuss whether advance in therapy to triple therapy with addition of sulfasalazine, or addition of a biologic agent is needed at the time  Asked patient to bring up her eye floaters issue to her eye doctor; she had a normal baseline eye exam before starting hydroxychloroquine      4/14/21: Patient mainly complains of left knee pain and swelling lately, worse when she is on her legs  Knee x-rays ordered reveal severe OA of left knee  Left knee steroid injection given in clinic, which patient tolerated well  Routine monitoring labs ordered and completed  Will continue patient on same medications, since her main symptomatic joint is likely due to her severe OA in left knee rather than RA, which was addressed to today with steroid injection  Continue methotrexate 20mg po weekly with daily folic acid and hydroxychloroquine 200mg po bid; patient is aware to get regular eye exams  Emphasized to patient that she can take the diclofenac as needed instead of regularly  For her left calf numbness, prescribed gabapentin 100mg po tid prn     7/14/21:Symptoms much improved after adding methotrexate, and performing L knee steroid injection at last visit, which was completed again today since joint pain and swelling symptoms just started returning a few days ago  Increased gabapentin to 300 mg po TID for left leg numbness; will be seeing pain management shortly to address her lumbosacral radiculopathy causing the left leg numbness  Can also use diclofenac gel as needed for painful joints  She is to continue hydroxychloroquine 200 mg p o  b i d , methotrexate 20 mg p o  weekly with folic acid 1 mg p o  daily, and diclofenac 75 mg p o  b i d  as needed for joint pain  Ordered routine monitoring labs; CRP has significantly improved  10/13/21: Recommended physical therapy for likely left knee OA as well as referral to orthopedics for evaluation for hyaluronic acid gel injections  Symptoms much improved after performing L knee steroid injection at visit prior, which was completed again this visit since joint pain began worsening recently  She was to continue methotrexate 20 mg p o  weekly with folic acid 1 mg p o  daily, and diclofenac 75 mg p o  b i d  and diclofenac gel as needed for joint pain   Discontinued hydroxychloroquine as this was not helping her symptoms previously and the methotrexate was likely what was controlling her symptoms  Discontinue gabapentin given patient's side effects to this medication  She was to continue daily Vit  D      2/16/22: Patient stated that her joint pain and morning stiffness are getting worse  Discussed with patient the benefits and risks of biological agents (anti-TNF inhibitors)  Instruction for injection  Patient is going to have her covid-19 vaccine  Continue diclofenac twice a day as needed for joint pain  Continue diclofenac gel as needed for joint pain  Continue folic acid daily  Continue methotrexate 8 tabs once a week; hold one methotrexate dose after COVID vaccine  Can take Vit  D daily  Stop hydroxychloroquine since not helping  Will work on prior auth for Humira every other week injection  Do labs before next visit  [facility] paperwork filled out    7/6/22: Humira has made her RA symptoms worse  Stop diclofenac, can take ibuprofen 800mg up to 3 times a day as needed for joint pain  Take famotidine twice a day if taking ibuprofen regularly to help protect stomach  Continue folic acid daily  Continue methotrexate 8 tabs once a week  Take Vit  D daily  Restart hydroxychloroquine twice a day; get regular eye exams  Eloisa Larson was approved next instead of Arvin Gutierreztanner Estes is a 47 y o   female who presents for RA follow up  Last clinic visit was 7/6/22  Flares are happening frequently  Full body  Mouth sores  Hip area and radiating down legs  Doesn't think Orencia is any better than Humira  No history of blood clots  Ibuprofen is helping more than diclofenac did, takes morning and night  Has weakness in legs  Vapes  Last eye exam was 6 months ago      The following portions of the patient's history were reviewed and updated as appropriate: allergies, current medications, past family history, past medical history, past social history, past surgical history and problem list     Review of Systems:   Review of Systems   Constitutional: Negative for chills and fever  HENT: Negative for ear pain and sore throat  Eyes: Negative for pain and visual disturbance  Respiratory: Negative for cough and shortness of breath  Cardiovascular: Negative for chest pain and palpitations  Gastrointestinal: Negative for abdominal pain and vomiting  Genitourinary: Negative for dysuria and hematuria  Musculoskeletal: Positive for arthralgias  Negative for back pain  Skin: Negative for color change and rash  Neurological: Negative for seizures and syncope  All other systems reviewed and are negative  Home Medications:     Current Outpatient Medications:   •  Abatacept (Orencia ClickJect) 812 MG/ML SOAJ, Inject 125 mg SC weekly, Disp: 4 mL, Rfl: 11  •  cholecalciferol (VITAMIN D3) 1,000 units tablet, Take 2,000 Units by mouth, Disp: , Rfl:   •  famotidine (PEPCID) 20 mg tablet, Take 1 tablet (20 mg total) by mouth 2 (two) times a day, Disp: 180 tablet, Rfl: 2  •  folic acid (FOLVITE) 1 mg tablet, Take 1 tablet (1 mg total) by mouth daily, Disp: 90 tablet, Rfl: 2  •  hydroxychloroquine (PLAQUENIL) 200 mg tablet, TAKE 1 TABLET BY MOUTH  TWICE DAILY, Disp: 180 tablet, Rfl: 3  •  ibuprofen (MOTRIN) 800 mg tablet, TAKE 1 TABLET BY MOUTH  EVERY 8 HOURS AS NEEDED FOR MILD PAIN OR MODERATE PAIN  TAKE WITH FOOD, Disp: 270 tablet, Rfl: 3  •  methotrexate 2 5 MG tablet, TAKE EIGHT TABLETS BY MOUTH ONCE A WEEK  take all 8 tablets on the same day every week, Disp: 96 tablet, Rfl: 2  •  predniSONE 10 mg tablet, Take 2 tablets (20 mg total) by mouth daily for 7 days, THEN 1 tablet (10 mg total) daily for 7 days  , Disp: 21 tablet, Rfl: 0  •  Acetaminophen (TYLENOL ARTHRITIS PAIN PO), Take by mouth   (Patient not taking: Reported on 7/6/2022), Disp: , Rfl:   •  Ascorbic Acid (vitamin C) 1000 MG tablet, Take 1,000 mg by mouth daily (Patient not taking: Reported on 7/6/2022), Disp: , Rfl:   •  magnesium gluconate (MAGONATE) 500 mg tablet, Take 500 mg by mouth daily (Patient not taking: Reported on 7/6/2022), Disp: , Rfl:   •  metFORMIN (GLUCOPHAGE) 500 mg tablet, Take 1 tablet (500 mg total) by mouth daily with dinner (Patient not taking: Reported on 7/6/2022), Disp: 90 tablet, Rfl: 1  •  multivitamin (THERAGRAN) TABS, Take 1 tablet by mouth daily (Patient not taking: Reported on 7/6/2022), Disp: , Rfl:   •  phentermine 30 MG capsule, Take 1 capsule (30 mg total) by mouth every morning (Patient not taking: Reported on 7/6/2022), Disp: 30 capsule, Rfl: 2  •  pyridoxine (B-6) 100 MG tablet, Take 100 mg by mouth (Patient not taking: Reported on 7/6/2022), Disp: , Rfl:   •  vitamin B-12 (VITAMIN B-12) 1,000 mcg tablet, Take by mouth daily (Patient not taking: Reported on 7/6/2022), Disp: , Rfl:   •  zinc gluconate 50 mg tablet, Take 50 mg by mouth daily (Patient not taking: Reported on 7/6/2022), Disp: , Rfl:     Objective:    Vitals:    12/07/22 1213   BP: 132/84   Height: 5' 2" (1 575 m)       Physical Exam  Constitutional:       General: She is not in acute distress  HENT:      Head: Normocephalic and atraumatic  Eyes:      Conjunctiva/sclera: Conjunctivae normal    Cardiovascular:      Rate and Rhythm: Normal rate and regular rhythm  Heart sounds: S1 normal and S2 normal      No friction rub  Pulmonary:      Effort: Pulmonary effort is normal  No respiratory distress  Breath sounds: Normal breath sounds  No wheezing, rhonchi or rales  Musculoskeletal:         General: Tenderness present  Cervical back: Neck supple  Skin:     Coloration: Skin is not pale  Neurological:      Mental Status: She is alert  Mental status is at baseline  Psychiatric:         Mood and Affect: Mood normal          Behavior: Behavior normal        Reviewed labs and imaging      Imaging:   XR bilateral knee 12/10/21  Right; Mild degenerative changes  Left: Moderate degenerative changes    MRI lumbar spine w wo contrast 6/30/2021  Impression: Central and left paramedian protrusion type disc herniation at L5-S1 contacts and potentially impacts the left S1 nerve root  Correlate for left S1 radiculopathy  Mild degenerative change results were described       Bilateral Knee x-rays 4/14/21  Right: unremarkable  Left: Tricompartmental osteoarthritic degenerative changes of the left knee with severe medial compartment joint space narrowing      Bilateral Hand x-rays 8/20/20: unremarkable    Labs:   Office Visit on 12/07/2022   Component Date Value Ref Range Status   • WBC 12/07/2022 7 12  4 31 - 10 16 Thousand/uL Final   • RBC 12/07/2022 3 79 (L)  3 81 - 5 12 Million/uL Final   • Hemoglobin 12/07/2022 11 2 (L)  11 5 - 15 4 g/dL Final   • Hematocrit 12/07/2022 35 0  34 8 - 46 1 % Final   • MCV 12/07/2022 92  82 - 98 fL Final   • MCH 12/07/2022 29 6  26 8 - 34 3 pg Final   • MCHC 12/07/2022 32 0  31 4 - 37 4 g/dL Final   • RDW 12/07/2022 13 1  11 6 - 15 1 % Final   • MPV 12/07/2022 9 5  8 9 - 12 7 fL Final   • Platelets 49/37/3841 362  149 - 390 Thousands/uL Final   • nRBC 12/07/2022 0  /100 WBCs Final   • Neutrophils Relative 12/07/2022 59  43 - 75 % Final   • Immat GRANS % 12/07/2022 0  0 - 2 % Final   • Lymphocytes Relative 12/07/2022 28  14 - 44 % Final   • Monocytes Relative 12/07/2022 10  4 - 12 % Final   • Eosinophils Relative 12/07/2022 2  0 - 6 % Final   • Basophils Relative 12/07/2022 1  0 - 1 % Final   • Neutrophils Absolute 12/07/2022 4 14  1 85 - 7 62 Thousands/µL Final   • Immature Grans Absolute 12/07/2022 0 03  0 00 - 0 20 Thousand/uL Final   • Lymphocytes Absolute 12/07/2022 2 01  0 60 - 4 47 Thousands/µL Final   • Monocytes Absolute 12/07/2022 0 70  0 17 - 1 22 Thousand/µL Final   • Eosinophils Absolute 12/07/2022 0 17  0 00 - 0 61 Thousand/µL Final   • Basophils Absolute 12/07/2022 0 07  0 00 - 0 10 Thousands/µL Final   • Sodium 12/07/2022 141  135 - 147 mmol/L Final   • Potassium 12/07/2022 4 1  3 5 - 5 3 mmol/L Final   • Chloride 12/07/2022 107  96 - 108 mmol/L Final • CO2 12/07/2022 26  21 - 32 mmol/L Final   • ANION GAP 12/07/2022 8  4 - 13 mmol/L Final   • BUN 12/07/2022 16  5 - 25 mg/dL Final   • Creatinine 12/07/2022 0 64  0 60 - 1 30 mg/dL Final    Standardized to IDMS reference method   • Glucose, Fasting 12/07/2022 84  65 - 99 mg/dL Final    Specimen collection should occur prior to Sulfasalazine administration due to the potential for falsely depressed results  Specimen collection should occur prior to Sulfapyridine administration due to the potential for falsely elevated results  • Calcium 12/07/2022 9 5  8 4 - 10 2 mg/dL Final   • AST 12/07/2022 19  13 - 39 U/L Final    Specimen collection should occur prior to Sulfasalazine administration due to the potential for falsely depressed results  • ALT 12/07/2022 26  7 - 52 U/L Final    Specimen collection should occur prior to Sulfasalazine administration due to the potential for falsely depressed results  • Alkaline Phosphatase 12/07/2022 68  34 - 104 U/L Final   • Total Protein 12/07/2022 7 6  6 4 - 8 4 g/dL Final   • Albumin 12/07/2022 4 3  3 5 - 5 0 g/dL Final   • Total Bilirubin 12/07/2022 0 27  0 20 - 1 00 mg/dL Final   • eGFR 12/07/2022 102  ml/min/1 73sq m Final   • Sed Rate 12/07/2022 13  0 - 29 mm/hour Final   • CRP 12/07/2022 2 1  <3 0 mg/L Final   • QFT Nil 12/07/2022 0 04  0 - 8 0 IU/ml Final   • QFT TB1-NIL 12/07/2022 0 00  IU/ml Final   • QFT TB2-NIL 12/07/2022 0 00  IU/ml Final   • QFT Mitogen-NIL 12/07/2022 >10 00  IU/ml Final   • QFT Final Interpretation 12/07/2022 Negative  Negative Final    No Interferon-gamma response to M  tuberculosis antigens detected  Infection with M  tuberculosis is unlikely  A single negative result does not exclude infection with M  tuberculosis  In patients at high risk for M  tuberculosis infection, a second test should be considered in accordance with the 2017 ATS/IDSA/CDC Clinical Practice Guidelines for Diagnosis of Tuberculosis in Adults and Children   False negative results can be a result of incorrect blood sample collection or handling of the specimen affecting lymphocyte function  • Hepatitis B Surface Ag 12/07/2022 Non-reactive  Non-reactive, NonReactive - Confirmed Final   • Hepatitis C Ab 12/07/2022 Non-reactive  Non-reactive Final   • Hep B C IgM 12/07/2022 Non-reactive  Non-reactive Final   • Hep B Core Total Ab 12/07/2022 Non-reactive  Non-reactive Final   • Total CK 12/07/2022 81  26 - 192 U/L Final   • Aldolase 12/07/2022 5 4  3 3 - 10 3 U/L Final   Appointment on 07/08/2022   Component Date Value Ref Range Status   • Cholesterol 07/08/2022 236 (H)  See Comment mg/dL Final    Cholesterol:         Pediatric <18 Years        Desirable          <170 mg/dL      Borderline High    170-199 mg/dL      High               >=200 mg/dL        Adult >=18 Years            Desirable         <200 mg/dL      Borderline High   200-239 mg/dL      High              >239 mg/dL     • Triglycerides 07/08/2022 139  See Comment mg/dL Final    Triglyceride:     0-9Y            <75mg/dL     10Y-17Y         <90 mg/dL       >=18Y     Normal          <150 mg/dL     Borderline High 150-199 mg/dL     High            200-499 mg/dL        Very High       >499 mg/dL    Specimen collection should occur prior to N-Acetylcysteine or Metamizole administration due to the potential for falsely depressed results  • HDL, Direct 07/08/2022 68  >=50 mg/dL Final   • LDL Calculated 07/08/2022 140 (H)  0 - 100 mg/dL Final    LDL Cholesterol:     Optimal           <100 mg/dl     Near Optimal      100-129 mg/dl     Above Optimal       Borderline High 130-159 mg/dl       High            160-189 mg/dl       Very High       >189 mg/dl         This screening LDL is a calculated result  It does not have the accuracy of the Direct Measured LDL in the monitoring of patients with hyperlipidemia and/or statin therapy  Direct Measure LDL (OOW459) must be ordered separately in these patients     Office Visit on 07/06/2022   Component Date Value Ref Range Status   • WBC 07/08/2022 10 13  4 31 - 10 16 Thousand/uL Final   • RBC 07/08/2022 4 13  3 81 - 5 12 Million/uL Final   • Hemoglobin 07/08/2022 12 7  11 5 - 15 4 g/dL Final   • Hematocrit 07/08/2022 39 2  34 8 - 46 1 % Final   • MCV 07/08/2022 95  82 - 98 fL Final   • MCH 07/08/2022 30 8  26 8 - 34 3 pg Final   • MCHC 07/08/2022 32 4  31 4 - 37 4 g/dL Final   • RDW 07/08/2022 14 7  11 6 - 15 1 % Final   • MPV 07/08/2022 9 0  8 9 - 12 7 fL Final   • Platelets 92/40/3564 394 (H)  149 - 390 Thousands/uL Final   • nRBC 07/08/2022 0  /100 WBCs Final   • Neutrophils Relative 07/08/2022 65  43 - 75 % Final   • Immat GRANS % 07/08/2022 1  0 - 2 % Final   • Lymphocytes Relative 07/08/2022 21  14 - 44 % Final   • Monocytes Relative 07/08/2022 10  4 - 12 % Final   • Eosinophils Relative 07/08/2022 2  0 - 6 % Final   • Basophils Relative 07/08/2022 1  0 - 1 % Final   • Neutrophils Absolute 07/08/2022 6 72  1 85 - 7 62 Thousands/µL Final   • Immature Grans Absolute 07/08/2022 0 05  0 00 - 0 20 Thousand/uL Final   • Lymphocytes Absolute 07/08/2022 2 13  0 60 - 4 47 Thousands/µL Final   • Monocytes Absolute 07/08/2022 0 99  0 17 - 1 22 Thousand/µL Final   • Eosinophils Absolute 07/08/2022 0 17  0 00 - 0 61 Thousand/µL Final   • Basophils Absolute 07/08/2022 0 07  0 00 - 0 10 Thousands/µL Final   • Sodium 07/08/2022 139  135 - 147 mmol/L Final   • Potassium 07/08/2022 4 8  3 5 - 5 3 mmol/L Final   • Chloride 07/08/2022 107  96 - 108 mmol/L Final   • CO2 07/08/2022 28  21 - 32 mmol/L Final   • ANION GAP 07/08/2022 4  4 - 13 mmol/L Final   • BUN 07/08/2022 23  5 - 25 mg/dL Final   • Creatinine 07/08/2022 0 64  0 60 - 1 30 mg/dL Final    Standardized to IDMS reference method   • Glucose, Fasting 07/08/2022 85  65 - 99 mg/dL Final    Specimen collection should occur prior to Sulfasalazine administration due to the potential for falsely depressed results   Specimen collection should occur prior to Sulfapyridine administration due to the potential for falsely elevated results  • Calcium 07/08/2022 9 2  8 4 - 10 2 mg/dL Final   • AST 07/08/2022 20  13 - 39 U/L Final    Specimen collection should occur prior to Sulfasalazine administration due to the potential for falsely depressed results  • ALT 07/08/2022 67 (H)  7 - 52 U/L Final    Specimen collection should occur prior to Sulfasalazine administration due to the potential for falsely depressed results      • Alkaline Phosphatase 07/08/2022 59  34 - 104 U/L Final   • Total Protein 07/08/2022 7 5  6 4 - 8 4 g/dL Final   • Albumin 07/08/2022 4 2  3 5 - 5 0 g/dL Final   • Total Bilirubin 07/08/2022 0 37  0 20 - 1 00 mg/dL Final   • eGFR 07/08/2022 102  ml/min/1 73sq m Final   • CRP 07/08/2022 <1 0  <3 0 mg/L Final   • Sed Rate 07/08/2022 15  0 - 29 mm/hour Final

## 2022-12-07 ENCOUNTER — OFFICE VISIT (OUTPATIENT)
Dept: RHEUMATOLOGY | Facility: CLINIC | Age: 52
End: 2022-12-07

## 2022-12-07 ENCOUNTER — APPOINTMENT (OUTPATIENT)
Dept: LAB | Facility: CLINIC | Age: 52
End: 2022-12-07

## 2022-12-07 VITALS — SYSTOLIC BLOOD PRESSURE: 132 MMHG | DIASTOLIC BLOOD PRESSURE: 84 MMHG | HEIGHT: 62 IN | BODY MASS INDEX: 34.2 KG/M2

## 2022-12-07 DIAGNOSIS — M05.79 RHEUMATOID ARTHRITIS INVOLVING MULTIPLE SITES WITH POSITIVE RHEUMATOID FACTOR (HCC): Primary | ICD-10-CM

## 2022-12-07 DIAGNOSIS — M17.12 OSTEOARTHRITIS OF LEFT KNEE, UNSPECIFIED OSTEOARTHRITIS TYPE: ICD-10-CM

## 2022-12-07 DIAGNOSIS — Z79.899 HIGH RISK MEDICATION USE: ICD-10-CM

## 2022-12-07 LAB
ALBUMIN SERPL BCP-MCNC: 4.3 G/DL (ref 3.5–5)
ALP SERPL-CCNC: 68 U/L (ref 34–104)
ALT SERPL W P-5'-P-CCNC: 26 U/L (ref 7–52)
ANION GAP SERPL CALCULATED.3IONS-SCNC: 8 MMOL/L (ref 4–13)
AST SERPL W P-5'-P-CCNC: 19 U/L (ref 13–39)
BASOPHILS # BLD AUTO: 0.07 THOUSANDS/ÂΜL (ref 0–0.1)
BASOPHILS NFR BLD AUTO: 1 % (ref 0–1)
BILIRUB SERPL-MCNC: 0.27 MG/DL (ref 0.2–1)
BUN SERPL-MCNC: 16 MG/DL (ref 5–25)
CALCIUM SERPL-MCNC: 9.5 MG/DL (ref 8.4–10.2)
CHLORIDE SERPL-SCNC: 107 MMOL/L (ref 96–108)
CK SERPL-CCNC: 81 U/L (ref 26–192)
CO2 SERPL-SCNC: 26 MMOL/L (ref 21–32)
CREAT SERPL-MCNC: 0.64 MG/DL (ref 0.6–1.3)
CRP SERPL QL: 2.1 MG/L
EOSINOPHIL # BLD AUTO: 0.17 THOUSAND/ÂΜL (ref 0–0.61)
EOSINOPHIL NFR BLD AUTO: 2 % (ref 0–6)
ERYTHROCYTE [DISTWIDTH] IN BLOOD BY AUTOMATED COUNT: 13.1 % (ref 11.6–15.1)
ERYTHROCYTE [SEDIMENTATION RATE] IN BLOOD: 13 MM/HOUR (ref 0–29)
GFR SERPL CREATININE-BSD FRML MDRD: 102 ML/MIN/1.73SQ M
GLUCOSE P FAST SERPL-MCNC: 84 MG/DL (ref 65–99)
HBV CORE AB SER QL: NORMAL
HBV CORE IGM SER QL: NORMAL
HBV SURFACE AG SER QL: NORMAL
HCT VFR BLD AUTO: 35 % (ref 34.8–46.1)
HCV AB SER QL: NORMAL
HGB BLD-MCNC: 11.2 G/DL (ref 11.5–15.4)
IMM GRANULOCYTES # BLD AUTO: 0.03 THOUSAND/UL (ref 0–0.2)
IMM GRANULOCYTES NFR BLD AUTO: 0 % (ref 0–2)
LYMPHOCYTES # BLD AUTO: 2.01 THOUSANDS/ÂΜL (ref 0.6–4.47)
LYMPHOCYTES NFR BLD AUTO: 28 % (ref 14–44)
MCH RBC QN AUTO: 29.6 PG (ref 26.8–34.3)
MCHC RBC AUTO-ENTMCNC: 32 G/DL (ref 31.4–37.4)
MCV RBC AUTO: 92 FL (ref 82–98)
MONOCYTES # BLD AUTO: 0.7 THOUSAND/ÂΜL (ref 0.17–1.22)
MONOCYTES NFR BLD AUTO: 10 % (ref 4–12)
NEUTROPHILS # BLD AUTO: 4.14 THOUSANDS/ÂΜL (ref 1.85–7.62)
NEUTS SEG NFR BLD AUTO: 59 % (ref 43–75)
NRBC BLD AUTO-RTO: 0 /100 WBCS
PLATELET # BLD AUTO: 362 THOUSANDS/UL (ref 149–390)
PMV BLD AUTO: 9.5 FL (ref 8.9–12.7)
POTASSIUM SERPL-SCNC: 4.1 MMOL/L (ref 3.5–5.3)
PROT SERPL-MCNC: 7.6 G/DL (ref 6.4–8.4)
RBC # BLD AUTO: 3.79 MILLION/UL (ref 3.81–5.12)
SODIUM SERPL-SCNC: 141 MMOL/L (ref 135–147)
WBC # BLD AUTO: 7.12 THOUSAND/UL (ref 4.31–10.16)

## 2022-12-07 RX ORDER — FAMOTIDINE 20 MG/1
20 TABLET, FILM COATED ORAL 2 TIMES DAILY
Qty: 180 TABLET | Refills: 2 | Status: SHIPPED | OUTPATIENT
Start: 2022-12-07

## 2022-12-07 RX ORDER — PREDNISONE 10 MG/1
TABLET ORAL
Qty: 21 TABLET | Refills: 0 | Status: SHIPPED | OUTPATIENT
Start: 2022-12-07 | End: 2022-12-07

## 2022-12-07 RX ORDER — HYDROXYCHLOROQUINE SULFATE 200 MG/1
TABLET, FILM COATED ORAL
Qty: 180 TABLET | Refills: 3 | Status: SHIPPED | OUTPATIENT
Start: 2022-12-07 | End: 2023-12-02

## 2022-12-07 RX ORDER — METHOTREXATE 2.5 MG/1
TABLET ORAL
Qty: 96 TABLET | Refills: 2 | Status: SHIPPED | OUTPATIENT
Start: 2022-12-07

## 2022-12-07 RX ORDER — FOLIC ACID 1 MG/1
1 TABLET ORAL DAILY
Qty: 90 TABLET | Refills: 2 | Status: SHIPPED | OUTPATIENT
Start: 2022-12-07

## 2022-12-07 RX ORDER — PREDNISONE 10 MG/1
TABLET ORAL
Qty: 21 TABLET | Refills: 0 | Status: SHIPPED | OUTPATIENT
Start: 2022-12-07 | End: 2022-12-21

## 2022-12-07 NOTE — PATIENT INSTRUCTIONS
Continue ibuprofen 800mg up to 3 times a day as needed for joint pain  Continue famotidine twice a day if taking ibuprofen regularly to help protect stomach  Continue folic acid daily  Continue methotrexate 8 tabs once a week  Continue Vit   D daily  Continue hydroxychloroquine twice a day; get regular eye exams  Will work on prior auth for Actemra weekly injections  Take prednisone taper during a flare-up  Do labs today     Return to clinic in 6 months

## 2022-12-08 LAB — ALDOLASE SERPL-CCNC: 5.4 U/L (ref 3.3–10.3)

## 2022-12-09 LAB
GAMMA INTERFERON BACKGROUND BLD IA-ACNC: 0.04 IU/ML
M TB IFN-G BLD-IMP: NEGATIVE
M TB IFN-G CD4+ BCKGRND COR BLD-ACNC: 0 IU/ML
M TB IFN-G CD4+ BCKGRND COR BLD-ACNC: 0 IU/ML
MITOGEN IGNF BCKGRD COR BLD-ACNC: >10 IU/ML

## 2022-12-14 ENCOUNTER — TELEPHONE (OUTPATIENT)
Dept: RHEUMATOLOGY | Facility: CLINIC | Age: 52
End: 2022-12-14

## 2022-12-14 DIAGNOSIS — M05.79 RHEUMATOID ARTHRITIS INVOLVING MULTIPLE SITES WITH POSITIVE RHEUMATOID FACTOR (HCC): Primary | ICD-10-CM

## 2022-12-14 RX ORDER — TOCILIZUMAB 180 MG/ML
162 INJECTION, SOLUTION SUBCUTANEOUS
Qty: 4 ML | Refills: 11 | Status: SHIPPED | OUTPATIENT
Start: 2022-12-14

## 2022-12-15 NOTE — TELEPHONE ENCOUNTER
Please work on prior authorization for patient's Actemra (tocilizumab) 162 mg weekly pen injections for her seropositive rheumatoid arthritis   She has tried and failed hydroxychloroquine and methotrexate, Humira, and lately Orencia  Has recent negative viral hepatitis panel and TB test on file  Is up to date on her vaccinations and has no active infections  Let me know once approved so I can send the prescription to her specialty pharmacy

## 2023-01-13 ENCOUNTER — TELEMEDICINE (OUTPATIENT)
Dept: FAMILY MEDICINE CLINIC | Facility: CLINIC | Age: 53
End: 2023-01-13

## 2023-01-13 DIAGNOSIS — J06.9 ACUTE UPPER RESPIRATORY INFECTION: Primary | ICD-10-CM

## 2023-01-13 RX ORDER — DOXYCYCLINE HYCLATE 100 MG/1
100 CAPSULE ORAL EVERY 12 HOURS SCHEDULED
Qty: 20 CAPSULE | Refills: 0 | Status: SHIPPED | OUTPATIENT
Start: 2023-01-13 | End: 2023-01-23

## 2023-01-13 RX ORDER — PROMETHAZINE HYDROCHLORIDE AND CODEINE PHOSPHATE 6.25; 1 MG/5ML; MG/5ML
5 SYRUP ORAL EVERY 4 HOURS PRN
Qty: 180 ML | Refills: 0 | Status: SHIPPED | OUTPATIENT
Start: 2023-01-13 | End: 2023-02-13 | Stop reason: SDUPTHER

## 2023-01-13 NOTE — PROGRESS NOTES
Virtual Regular Visit    Verification of patient location:    Patient is located in the following state in which I hold an active license [place]      Assessment/Plan:    Problem List Items Addressed This Visit    None  Visit Diagnoses     Acute upper respiratory infection    -  Primary    Relevant Medications    promethazine-codeine (PHENERGAN WITH CODEINE) 6 25-10 mg/5 mL syrup          BMI Counseling: There is no height or weight on file to calculate BMI  The BMI is above normal  Nutrition recommendations include decreasing portion sizes, encouraging healthy choices of fruits and vegetables, decreasing fast food intake, consuming healthier snacks, limiting drinks that contain sugar, moderation in carbohydrate intake, increasing intake of lean protein, reducing intake of saturated and trans fat and reducing intake of cholesterol  Exercise recommendations include moderate physical activity 150 minutes/week, exercising 3-5 times per week and strength training exercises  No pharmacotherapy was ordered  Rationale for BMI follow-up plan is due to patient being overweight or obese  Depression Screening and Follow-up Plan: Patient was screened for depression during today's encounter  They screened negative with a PHQ-2 score of 0  Reason for visit is   Chief Complaint   Patient presents with   • Virtual Brief Visit   • Virtual Regular Visit        Encounter provider ELVIRA Peter    Provider located at [address] [id number]      Recent Visits  No visits were found meeting these conditions  Showing recent visits within past 7 days and meeting all other requirements  Future Appointments  No visits were found meeting these conditions  Showing future appointments within next 150 days and meeting all other requirements       The patient was identified by name and date of birth   Kira Aguayo was informed that this is a telemedicine visit and that the visit is being conducted through the [employer] platform  She agrees to proceed     My office door was closed  No one else was in the room  She acknowledged consent and understanding of privacy and security of the video platform  The patient has agreed to participate and understands they can discontinue the visit at any time  Patient is aware this is a billable service  Subjective  Yousif Shah is a 46 y o  female          No fever  Not feeling well  On steroid, end of taper, has 4 more days  Today is day 3  Started with sore throat  Sinus pain  Now chest congestion  Going around work  People at work, have it  Cough med and given antibiotic  2 people at work with same problem  Cannot take RA meds  covid tested weekly and negative   Very tired  Friend had it         Past Medical History:   Diagnosis Date   • Allergic     Seasonal   • Anxiety    • Depression    • Knee pain    • Pinched nerve     L5, L2   • Rheumatoid arthritis ([facility] [address] )        Past Surgical History:   Procedure Laterality Date   • ROTATOR CUFF REPAIR         Current Outpatient Medications   Medication Sig Dispense Refill   • Ascorbic Acid (vitamin C) 1000 MG tablet Take 1,000 mg by mouth daily     • cholecalciferol (VITAMIN D3) 1,000 units tablet Take 2,000 Units by mouth     • famotidine (PEPCID) 20 mg tablet Take 1 tablet (20 mg total) by mouth 2 (two) times a day 162 tablet 2   • folic acid (FOLVITE) 1 mg tablet Take 1 tablet (1 mg total) by mouth daily 90 tablet 2   • hydroxychloroquine (PLAQUENIL) 200 mg tablet TAKE 1 TABLET BY MOUTH  TWICE DAILY 180 tablet 3   • ibuprofen (MOTRIN) 800 mg tablet TAKE 1 TABLET BY MOUTH  EVERY 8 HOURS AS NEEDED FOR MILD PAIN OR MODERATE PAIN  TAKE WITH FOOD 270 tablet 3   • magnesium gluconate (MAGONATE) 500 mg tablet Take 500 mg by mouth daily     • methotrexate 2 5 MG tablet TAKE EIGHT TABLETS BY MOUTH ONCE A WEEK  take all 8 tablets on the same day every week 96 tablet 2   • multivitamin (THERAGRAN) TABS Take 1 tablet by mouth daily     • phentermine 30 MG capsule Take 1 capsule (30 mg total) by mouth every morning 30 capsule 2   • promethazine-codeine (PHENERGAN WITH CODEINE) 6 25-10 mg/5 mL syrup Take 5 mL by mouth every 4 (four) hours as needed for cough 180 mL 0   • pyridoxine (B-6) 100 MG tablet Take 100 mg by mouth     • Tocilizumab (Actemra ACTPen) 162 MG/0 9ML SOAJ Inject 162 mg under the skin every 7 days 4 mL 11   • vitamin B-12 (VITAMIN B-12) 1,000 mcg tablet Take by mouth daily     • zinc gluconate 50 mg tablet Take 50 mg by mouth daily       No current facility-administered medications for this visit  Allergies   Allergen Reactions   • Clarithromycin GI Intolerance   • Seasonal Ic [Cholestatin] Other (See Comments)     stuffiness       Review of Systems   Constitutional: Positive for fatigue  Negative for fever  HENT: Positive for congestion, rhinorrhea, sinus pressure, sinus pain and sore throat  Negative for ear pain  Respiratory: Positive for cough  Negative for chest tightness, shortness of breath and wheezing  Cardiovascular: Negative for chest pain and palpitations  Gastrointestinal: Negative for constipation, diarrhea, nausea and vomiting  Genitourinary: Negative for dysuria and frequency  Musculoskeletal: Negative for arthralgias and myalgias  Skin: Negative for rash  Neurological: Negative for dizziness, light-headedness and headaches  Hematological: Negative for adenopathy  Psychiatric/Behavioral: Negative for dysphoric mood and sleep disturbance  The patient is not nervous/anxious  Video Exam    Vitals:       Physical Exam  Constitutional:       Appearance: Normal appearance  HENT:      Head: Normocephalic and atraumatic  Eyes:      Conjunctiva/sclera: Conjunctivae normal    Pulmonary:      Effort: Pulmonary effort is normal    Musculoskeletal:         General: Normal range of motion  Cervical back: Normal range of motion     Neurological: Mental Status: She is alert and oriented to person, place, and time     Psychiatric:         Mood and Affect: Mood normal          Behavior: Behavior normal           I spent 10 minutes with patient today in which greater than 50% of the time was spent in counseling/coordination of care regarding uri

## 2023-02-13 ENCOUNTER — OFFICE VISIT (OUTPATIENT)
Dept: FAMILY MEDICINE CLINIC | Facility: CLINIC | Age: 53
End: 2023-02-13

## 2023-02-13 VITALS
DIASTOLIC BLOOD PRESSURE: 84 MMHG | RESPIRATION RATE: 18 BRPM | BODY MASS INDEX: 40.12 KG/M2 | TEMPERATURE: 96.7 F | OXYGEN SATURATION: 99 % | WEIGHT: 218 LBS | HEIGHT: 62 IN | SYSTOLIC BLOOD PRESSURE: 122 MMHG | HEART RATE: 100 BPM

## 2023-02-13 DIAGNOSIS — R63.5 WEIGHT GAIN: ICD-10-CM

## 2023-02-13 DIAGNOSIS — R05.1 ACUTE COUGH: Primary | ICD-10-CM

## 2023-02-13 RX ORDER — ALBUTEROL SULFATE 90 UG/1
2 AEROSOL, METERED RESPIRATORY (INHALATION) EVERY 6 HOURS PRN
Qty: 18 G | Refills: 1 | Status: SHIPPED | OUTPATIENT
Start: 2023-02-13

## 2023-02-13 RX ORDER — PROMETHAZINE HYDROCHLORIDE AND CODEINE PHOSPHATE 6.25; 1 MG/5ML; MG/5ML
5 SYRUP ORAL EVERY 4 HOURS PRN
Qty: 180 ML | Refills: 0 | Status: SHIPPED | OUTPATIENT
Start: 2023-02-13

## 2023-02-13 RX ORDER — DOXYCYCLINE 100 MG/1
100 CAPSULE ORAL 2 TIMES DAILY
Qty: 20 CAPSULE | Refills: 0 | Status: SHIPPED | OUTPATIENT
Start: 2023-02-13 | End: 2023-02-23

## 2023-02-13 RX ORDER — PHENTERMINE HYDROCHLORIDE 30 MG/1
30 CAPSULE ORAL EVERY MORNING
Qty: 30 CAPSULE | Refills: 2 | Status: SHIPPED | OUTPATIENT
Start: 2023-02-13

## 2023-02-13 NOTE — PROGRESS NOTES
FAMILY PRACTICE OFFICE VISIT       NAME: Kush Raya  AGE: 46 y o  SEX: female       : 1970        MRN: [id number]    DATE: 2023  TIME: 1:20 PM    Assessment and Plan   1  Acute cough  Comments:  Pt stable  Treating with Doxycycline, warm salt water gargles, Albuterol PRN, OTC Cold Preps PRN, rest, & good hydration  Check COV-19/Flu PCR  Orders:  -     doxycycline monohydrate (MONODOX) 100 mg capsule; Take 1 capsule (100 mg total) by mouth 2 (two) times a day for 10 days  -     albuterol (Ventolin HFA) 90 mcg/act inhaler; Inhale 2 puffs every 6 (six) hours as needed for wheezing or shortness of breath  -     Covid/Flu- Office Collect  -     promethazine-codeine (PHENERGAN WITH CODEINE) 6 25-10 mg/5 mL syrup; Take 5 mL by mouth every 4 (four) hours as needed for cough    2  Weight gain  Comments:  Discussed - Phentermine refilled ([place] PDMP checked)  Orders:  -     phentermine 30 MG capsule; Take 1 capsule (30 mg total) by mouth every morning         There are no Patient Instructions on file for this visit  Chief Complaint     Chief Complaint   Patient presents with   • Cough     Patient being seen for cough x 5 days   • Nasal Congestion     Patient being seen for congestion x 5 days       History of Present Illness   Kush Raya is a 46y o -year-old female who presents with a recurrence of cough (had last month) and congestion, with mucus production in the last 5+ days  Treated for same a month ago  Review of Systems   Review of Systems   Constitutional: Negative for activity change and fever  HENT: Positive for congestion and rhinorrhea  Negative for sore throat  ST resolved  Respiratory: Positive for cough  Mild SOB - has a PRN Albuterol inhaler at home           Active Problem List     Patient Active Problem List   Diagnosis   • Rheumatoid arthritis involving multiple sites with positive rheumatoid factor (HCC)   • High risk medication use   • Arthritis   • Calcific tendinitis   • Cervical pain   • COVID-19   • Low back pain with sciatica   • Lumbar radiculopathy   • Lumbar disc herniation   • Lumbar spondylosis   • Primary osteoarthritis of left knee         Past Medical History:  Past Medical History:   Diagnosis Date   • Allergic     Seasonal   • Anxiety    • Depression    • Knee pain    • Pinched nerve     L5, L2   • Rheumatoid arthritis (HCC)        Past Surgical History:  Past Surgical History:   Procedure Laterality Date   • ROTATOR CUFF REPAIR         Family History:  Family History   Problem Relation Age of Onset   • Arthritis Mother    • COPD Father        Social History:  Social History     Socioeconomic History   • Marital status: Single     Spouse name: Not on file   • Number of children: Not on file   • Years of education: Not on file   • Highest education level: Not on file   Occupational History   • Not on file   Tobacco Use   • Smoking status: Some Days     Types: E-Cigarettes   • Smokeless tobacco: Never   • Tobacco comments:     1 1/2 yrs vaping now juul     Vaping Use   • Vaping Use: Every day   • Substances: Nicotine, Flavoring   Substance and Sexual Activity   • Alcohol use: Not Currently     Comment: Social 1-5 times a month   • Drug use: Never   • Sexual activity: Yes     Partners: Female   Other Topics Concern   • Not on file   Social History Narrative   • Not on file     Social Determinants of Health     Financial Resource Strain: Not on file   Food Insecurity: Not on file   Transportation Needs: Not on file   Physical Activity: Not on file   Stress: Not on file   Social Connections: Not on file   Intimate Partner Violence: Not on file   Housing Stability: Not on file       Objective     Vitals:    02/13/23 1238   BP: 122/84   Pulse: 100   Resp: 18   Temp: (!) 96 7 °F (35 9 °C)   SpO2: 99%     Wt Readings from Last 3 Encounters:   02/13/23 98 9 kg (218 lb)   07/06/22 84 8 kg (187 lb)   04/29/22 83 1 kg (183 lb 3 2 oz)       Physical Exam  Vitals and nursing note reviewed  Constitutional:       General: She is not in acute distress  Appearance: Normal appearance  She is not ill-appearing, toxic-appearing or diaphoretic  HENT:      Head: Normocephalic and atraumatic  Right Ear: Tympanic membrane, ear canal and external ear normal       Left Ear: Tympanic membrane, ear canal and external ear normal       Nose: Congestion present  Eyes:      General: No scleral icterus  Conjunctiva/sclera: Conjunctivae normal    Cardiovascular:      Rate and Rhythm: Normal rate and regular rhythm  Heart sounds: Normal heart sounds  No murmur heard  No friction rub  No gallop  Pulmonary:      Effort: Pulmonary effort is normal  No respiratory distress  Breath sounds: Normal breath sounds  No stridor  No wheezing, rhonchi or rales  Musculoskeletal:      Cervical back: Normal range of motion and neck supple  No rigidity or tenderness  Lymphadenopathy:      Cervical: No cervical adenopathy  Neurological:      Mental Status: She is alert and oriented to person, place, and time  Psychiatric:         Mood and Affect: Mood normal          Behavior: Behavior normal          Thought Content:  Thought content normal          Judgment: Judgment normal          Pertinent Laboratory/Diagnostic Studies:  Lab Results   Component Value Date    BUN 16 12/07/2022    CREATININE 0 64 12/07/2022    CALCIUM 9 5 12/07/2022    K 4 1 12/07/2022    CO2 26 12/07/2022     12/07/2022     Lab Results   Component Value Date    ALT 26 12/07/2022    AST 19 12/07/2022    ALKPHOS 68 12/07/2022       Lab Results   Component Value Date    WBC 7 12 12/07/2022    HGB 11 2 (L) 12/07/2022    HCT 35 0 12/07/2022    MCV 92 12/07/2022     12/07/2022       No results found for: TSH    No results found for: CHOL  Lab Results   Component Value Date    TRIG 139 07/08/2022     Lab Results   Component Value Date    HDL 68 07/08/2022     Lab Results   Component Value Date [address] (H) 07/08/2022     No results found for: HGBA1C    Results for orders placed or performed in visit on 12/07/22   CBC and differential   Result Value Ref Range    WBC 7 12 4 31 - 10 16 Thousand/uL    RBC 3 79 (L) 3 81 - 5 12 Million/uL    Hemoglobin 11 2 (L) 11 5 - 15 4 g/dL    Hematocrit 35 0 34 8 - 46 1 %    MCV 92 82 - 98 fL    MCH 29 6 26 8 - 34 3 pg    MCHC 32 0 31 4 - 37 4 g/dL    RDW 13 1 11 6 - 15 1 %    MPV 9 5 8 9 - 12 7 fL    Platelets 381 479 - 998 Thousands/uL    nRBC 0 /100 WBCs    Neutrophils Relative 59 43 - 75 %    Immat GRANS % 0 0 - 2 %    Lymphocytes Relative 28 14 - 44 %    Monocytes Relative 10 4 - 12 %    Eosinophils Relative 2 0 - 6 %    Basophils Relative 1 0 - 1 %    Neutrophils Absolute 4 14 1 85 - 7 62 Thousands/µL    Immature Grans Absolute 0 03 0 00 - 0 20 Thousand/uL    Lymphocytes Absolute 2 01 0 60 - 4 47 Thousands/µL    Monocytes Absolute 0 70 0 17 - 1 22 Thousand/µL    Eosinophils Absolute 0 17 0 00 - 0 61 Thousand/µL    Basophils Absolute 0 07 0 00 - 0 10 Thousands/µL   Comprehensive metabolic panel   Result Value Ref Range    Sodium 141 135 - 147 mmol/L    Potassium 4 1 3 5 - 5 3 mmol/L    Chloride 107 96 - 108 mmol/L    CO2 26 21 - 32 mmol/L    ANION GAP 8 4 - 13 mmol/L    BUN 16 5 - 25 mg/dL    Creatinine 0 64 0 60 - 1 30 mg/dL    Glucose, Fasting 84 65 - 99 mg/dL    Calcium 9 5 8 4 - 10 2 mg/dL    AST 19 13 - 39 U/L    ALT 26 7 - 52 U/L    Alkaline Phosphatase 68 34 - 104 U/L    Total Protein 7 6 6 4 - 8 4 g/dL    Albumin 4 3 3 5 - 5 0 g/dL    Total Bilirubin 0 27 0 20 - 1 00 mg/dL    eGFR 102 ml/min/1 73sq m   Sedimentation rate, automated   Result Value Ref Range    Sed Rate 13 0 - 29 mm/hour   C-reactive protein   Result Value Ref Range    CRP 2 1 <3 0 mg/L   Quantiferon TB Gold Plus   Result Value Ref Range    QFT Nil 0 04 0 - 8 0 IU/ml    QFT TB1-NIL 0 00 IU/ml    QFT TB2-NIL 0 00 IU/ml    QFT Mitogen-NIL >10 00 IU/ml    QFT Final Interpretation Negative Negative   Chronic Hepatitis Panel   Result Value Ref Range    Hepatitis B Surface Ag Non-reactive Non-reactive, NonReactive - Confirmed    Hepatitis C Ab Non-reactive Non-reactive    Hep B C IgM Non-reactive Non-reactive    Hep B Core Total Ab Non-reactive Non-reactive   CK   Result Value Ref Range    Total CK 81 26 - 192 U/L   Aldolase   Result Value Ref Range    Aldolase 5 4 3 3 - 10 3 U/L       Orders Placed This Encounter   Procedures   • Covid/Flu- Office Collect       ALLERGIES:  Allergies   Allergen Reactions   • Clarithromycin GI Intolerance   • Seasonal Ic [Cholestatin] Other (See Comments)     stuffiness       Current Medications     Current Outpatient Medications   Medication Sig Dispense Refill   • albuterol (Ventolin HFA) 90 mcg/act inhaler Inhale 2 puffs every 6 (six) hours as needed for wheezing or shortness of breath 18 g 1   • Ascorbic Acid (vitamin C) 1000 MG tablet Take 1,000 mg by mouth daily     • cholecalciferol (VITAMIN D3) 1,000 units tablet Take 2,000 Units by mouth     • doxycycline monohydrate (MONODOX) 100 mg capsule Take 1 capsule (100 mg total) by mouth 2 (two) times a day for 10 days 20 capsule 0   • famotidine (PEPCID) 20 mg tablet Take 1 tablet (20 mg total) by mouth 2 (two) times a day 802 tablet 2   • folic acid (FOLVITE) 1 mg tablet Take 1 tablet (1 mg total) by mouth daily 90 tablet 2   • hydroxychloroquine (PLAQUENIL) 200 mg tablet TAKE 1 TABLET BY MOUTH  TWICE DAILY 180 tablet 3   • ibuprofen (MOTRIN) 800 mg tablet TAKE 1 TABLET BY MOUTH  EVERY 8 HOURS AS NEEDED FOR MILD PAIN OR MODERATE PAIN  TAKE WITH FOOD (Patient taking differently: Take 800 mg by mouth 2 (two) times a day) 270 tablet 3   • magnesium gluconate (MAGONATE) 500 mg tablet Take 500 mg by mouth daily     • methotrexate 2 5 MG tablet TAKE EIGHT TABLETS BY MOUTH ONCE A WEEK  take all 8 tablets on the same day every week 96 tablet 2   • multivitamin (THERAGRAN) TABS Take 1 tablet by mouth daily     • phentermine 30 MG capsule Take 1 capsule (30 mg total) by mouth every morning 30 capsule 2   • promethazine-codeine (PHENERGAN WITH CODEINE) 6 25-10 mg/5 mL syrup Take 5 mL by mouth every 4 (four) hours as needed for cough 180 mL 0   • pyridoxine (B-6) 100 MG tablet Take 100 mg by mouth     • Tocilizumab (Actemra ACTPen) 162 MG/0 9ML SOAJ Inject 162 mg under the skin every 7 days 4 mL 11   • vitamin B-12 (VITAMIN B-12) 1,000 mcg tablet Take by mouth daily     • zinc gluconate 50 mg tablet Take 50 mg by mouth daily       No current facility-administered medications for this visit           Health Maintenance     Health Maintenance   Topic Date Due   • Pneumococcal Vaccine: Pediatrics (0 to 5 Years) and At-Risk Patients (6 to 59 Years) (1 - PCV) Never done   • DTaP,Tdap,and Td Vaccines (1 - Tdap) Never done   • Cervical Cancer Screening  Never done   • Breast Cancer Screening: Mammogram  Never done   • Colorectal Cancer Screening  Never done   • COVID-19 Vaccine (2 - Moderna risk series) 03/16/2022   • Influenza Vaccine (1) Never done   • Annual Physical  01/28/2023   • HIV Screening  04/20/2023 (Originally 1/15/1985)   • Depression Screening  01/13/2024   • BMI: Followup Plan  02/05/2024   • BMI: Adult  02/13/2024   • Hepatitis C Screening  Completed   • HIB Vaccine  Aged Out   • IPV Vaccine  Aged Out   • Hepatitis A Vaccine  Aged Out   • Meningococcal ACWY Vaccine  Aged Out   • HPV Vaccine  Aged Out     Immunization History   Administered Date(s) Administered   • COVID-19 MODERNA VACC 0 5 ML IM 02/16/2022          Gonzalo Garcia DO

## 2023-02-14 LAB
FLUAV RNA RESP QL NAA+PROBE: NEGATIVE
FLUBV RNA RESP QL NAA+PROBE: NEGATIVE
SARS-COV-2 RNA RESP QL NAA+PROBE: NEGATIVE

## 2023-03-20 DIAGNOSIS — R63.5 WEIGHT GAIN: ICD-10-CM

## 2023-03-20 RX ORDER — PHENTERMINE HYDROCHLORIDE 15 MG/1
CAPSULE ORAL
Qty: 30 CAPSULE | Refills: 0 | Status: SHIPPED | OUTPATIENT
Start: 2023-03-20

## 2023-05-11 ENCOUNTER — RA CDI HCC (OUTPATIENT)
Dept: OTHER | Facility: HOSPITAL | Age: 53
End: 2023-05-11

## 2023-05-11 NOTE — PROGRESS NOTES
[facility]  coding opportunities       Chart reviewed, no opportunity found: CHART REVIEWED, NO OPPORTUNITY FOUND        Patients Insurance        Commercial Insurance: Herb Griffin

## 2023-05-19 ENCOUNTER — OFFICE VISIT (OUTPATIENT)
Dept: FAMILY MEDICINE CLINIC | Facility: CLINIC | Age: 53
End: 2023-05-19

## 2023-05-19 VITALS
TEMPERATURE: 97.8 F | WEIGHT: 207 LBS | HEART RATE: 91 BPM | HEIGHT: 62 IN | BODY MASS INDEX: 38.09 KG/M2 | OXYGEN SATURATION: 98 % | SYSTOLIC BLOOD PRESSURE: 120 MMHG | DIASTOLIC BLOOD PRESSURE: 72 MMHG | RESPIRATION RATE: 16 BRPM

## 2023-05-19 DIAGNOSIS — M05.79 RHEUMATOID ARTHRITIS INVOLVING MULTIPLE SITES WITH POSITIVE RHEUMATOID FACTOR (HCC): ICD-10-CM

## 2023-05-19 DIAGNOSIS — R63.5 WEIGHT GAIN: Primary | ICD-10-CM

## 2023-05-19 DIAGNOSIS — R61 HYPERHIDROSIS: ICD-10-CM

## 2023-05-19 RX ORDER — PREDNISONE 10 MG/1
TABLET ORAL
Qty: 30 TABLET | Refills: 0 | Status: SHIPPED | OUTPATIENT
Start: 2023-05-19 | End: 2023-05-31

## 2023-05-19 NOTE — PROGRESS NOTES
FAMILY PRACTICE OFFICE VISIT       NAME: Lauren Alvares  AGE: 46 y o  SEX: female       : 1970        MRN: [id number]    DATE: 2023  TIME: 1:30 PM    Assessment and Plan   1  Weight gain  Comments:  Stable with Phentermine  [place] PDMP checked  2  Rheumatoid arthritis involving multiple sites with positive rheumatoid factor (HCC)  Comments:  Treating with Prednisone taper for flare  Aggravated with fall  Orders:  -     predniSONE 10 mg tablet; Take 4 tablets (40 mg total) by mouth daily for 3 days, THEN 3 tablets (30 mg total) daily for 3 days, THEN 2 tablets (20 mg total) daily for 3 days, THEN 1 tablet (10 mg total) daily for 3 days  3  Hyperhidrosis  Comments:  Trial of Drysol topically  Orders:  -     aluminum chloride (DRYSOL) 20 % external solution; Apply topically daily at bedtime         There are no Patient Instructions on file for this visit  Chief Complaint     Chief Complaint   Patient presents with   • Follow-up       History of Present Illness   Lauren Alvares is a 46y o -year-old female who tripped and fell on her left side the other day due to RA flaring  Sees Rheumatology - not seeing for almost 2 months again  Taking Ibuprofen  Stopped taking Phentermine - [place] PDMP was checked - off due to how she was feeling with her RA  Review of Systems   Review of Systems   Constitutional: Negative for activity change and fever  Respiratory: Negative for shortness of breath  Cardiovascular: Negative for chest pain  Musculoskeletal: Positive for arthralgias  Skin:        +Strong axillary odor         Active Problem List     Patient Active Problem List   Diagnosis   • Rheumatoid arthritis involving multiple sites with positive rheumatoid factor (HCC)   • High risk medication use   • Arthritis   • Calcific tendinitis   • Cervical pain   • COVID-19   • Low back pain with sciatica   • Lumbar radiculopathy   • Lumbar disc herniation   • Lumbar spondylosis   • Primary osteoarthritis of left knee         Past Medical History:  Past Medical History:   Diagnosis Date   • Allergic     Seasonal   • Anxiety    • Depression    • Knee pain    • Pinched nerve     L5, L2   • Rheumatoid arthritis ([address] )        Past Surgical History:  Past Surgical History:   Procedure Laterality Date   • ROTATOR CUFF REPAIR         Family History:  Family History   Problem Relation Age of Onset   • Arthritis Mother    • COPD Father        Social History:  Social History     Socioeconomic History   • Marital status: Single     Spouse name: Not on file   • Number of children: Not on file   • Years of education: Not on file   • Highest education level: Not on file   Occupational History   • Not on file   Tobacco Use   • Smoking status: Some Days     Types: E-Cigarettes   • Smokeless tobacco: Never   • Tobacco comments:     1 1/2 yrs vaping now juul  Vaping Use   • Vaping Use: Every day   • Substances: Nicotine, Flavoring   Substance and Sexual Activity   • Alcohol use: Not Currently     Comment: Social 1-5 times a month   • Drug use: Never   • Sexual activity: Yes     Partners: Female   Other Topics Concern   • Not on file   Social History Narrative   • Not on file     Social Determinants of Health     Financial Resource Strain: Not on file   Food Insecurity: Not on file   Transportation Needs: Not on file   Physical Activity: Not on file   Stress: Not on file   Social Connections: Not on file   Intimate Partner Violence: Not on file   Housing Stability: Not on file       Objective     Vitals:    05/19/23 1303   BP: 120/72   Pulse: 91   Resp: 16   Temp: 97 8 °F (36 6 °C)   SpO2: 98%     Wt Readings from Last 3 Encounters:   05/19/23 93 9 kg (207 lb)   04/14/23 93 9 kg (207 lb)   02/13/23 98 9 kg (218 lb)       Physical Exam  Vitals and nursing note reviewed  Constitutional:       General: She is not in acute distress  Appearance: Normal appearance   She is not ill-appearing, toxic-appearing or diaphoretic  HENT:      Head: Normocephalic and atraumatic  Eyes:      General: No scleral icterus  Conjunctiva/sclera: Conjunctivae normal    Cardiovascular:      Rate and Rhythm: Normal rate and regular rhythm  Heart sounds: Normal heart sounds  No murmur heard  No friction rub  No gallop  Pulmonary:      Effort: Pulmonary effort is normal  No respiratory distress  Breath sounds: Normal breath sounds  No stridor  No wheezing, rhonchi or rales  Musculoskeletal:      Cervical back: Normal range of motion and neck supple  No rigidity or tenderness  Lymphadenopathy:      Cervical: No cervical adenopathy  Neurological:      Mental Status: She is alert and oriented to person, place, and time  Psychiatric:         Mood and Affect: Mood normal          Behavior: Behavior normal          Thought Content:  Thought content normal          Judgment: Judgment normal          Pertinent Laboratory/Diagnostic Studies:  Lab Results   Component Value Date    BUN 16 12/07/2022    CREATININE 0 64 12/07/2022    CALCIUM 9 5 12/07/2022    K 4 1 12/07/2022    CO2 26 12/07/2022     12/07/2022     Lab Results   Component Value Date    ALT 26 12/07/2022    AST 19 12/07/2022    ALKPHOS 68 12/07/2022       Lab Results   Component Value Date    WBC 7 12 12/07/2022    HGB 11 2 (L) 12/07/2022    HCT 35 0 12/07/2022    MCV 92 12/07/2022     12/07/2022       No results found for: TSH    No results found for: CHOL  Lab Results   Component Value Date    TRIG 139 07/08/2022     Lab Results   Component Value Date    HDL 68 07/08/2022     Lab Results   Component Value Date    LDLCALC 140 (H) 07/08/2022     No results found for: HGBA1C    Results for orders placed or performed in visit on 04/14/23   Urine culture    Specimen: Urine, Clean Catch   Result Value Ref Range    Urine Culture No Growth <1000 cfu/mL    UA (URINE) with reflex to Scope   Result Value Ref Range    Color, UA Light Yellow     Clarity, UA Clear     Specific [place], UA 1 019 1 003 - 1 030    pH, UA 5 5 4 5, 5 0, 5 5, 6 0, 6 5, 7 0, 7 5, 8 0    Leukocytes, UA Small (A) Negative    Nitrite, UA Negative Negative    Protein, UA Trace (A) Negative mg/dl    Glucose, UA Negative Negative mg/dl    Ketones, UA Negative Negative mg/dl    Urobilinogen, UA <2 0 <2 0 mg/dl mg/dl    Bilirubin, UA Negative Negative    Occult Blood, UA Negative Negative   Urine Microscopic   Result Value Ref Range    RBC, UA 1-2 None Seen, 1-2 /hpf    WBC, UA 1-2 None Seen, 1-2 /hpf    Epithelial Cells Occasional None Seen, Occasional /hpf    Bacteria, UA None Seen None Seen, Occasional /hpf    MUCUS THREADS Occasional (A) None Seen   POCT urine dip   Result Value Ref Range    LEUKOCYTE ESTERASE,UA Negative     NITRITE,UA Negative     SL AMB POCT UROBILINOGEN Negative     POCT URINE PROTEIN Negative      PH,UA 6 0     BLOOD,UA Negative     SPECIFIC GRAVITY,UA 1 030     KETONES,UA Negative     BILIRUBIN,UA Negative     GLUCOSE, UA Negative      COLOR,UA Yellow     CLARITY,UA Clear        No orders of the defined types were placed in this encounter  ALLERGIES:  Allergies   Allergen Reactions   • Clarithromycin GI Intolerance   • Seasonal Ic [Cholestatin] Other (See Comments)     stuffiness       Current Medications     Current Outpatient Medications   Medication Sig Dispense Refill   • albuterol (Ventolin HFA) 90 mcg/act inhaler Inhale 2 puffs every 6 (six) hours as needed for wheezing or shortness of breath 18 g 1   • aluminum chloride (DRYSOL) 20 % external solution Apply topically daily at bedtime 60 mL 1   • predniSONE 10 mg tablet Take 4 tablets (40 mg total) by mouth daily for 3 days, THEN 3 tablets (30 mg total) daily for 3 days, THEN 2 tablets (20 mg total) daily for 3 days, THEN 1 tablet (10 mg total) daily for 3 days   30 tablet 0   • Ascorbic Acid (vitamin C) 1000 MG tablet Take 1,000 mg by mouth daily     • cholecalciferol (VITAMIN D3) 1,000 units tablet Take 2,000 Units by mouth     • famotidine (PEPCID) 20 mg tablet Take 1 tablet (20 mg total) by mouth 2 (two) times a day 891 tablet 2   • folic acid (FOLVITE) 1 mg tablet Take 1 tablet (1 mg total) by mouth daily 90 tablet 2   • hydroxychloroquine (PLAQUENIL) 200 mg tablet TAKE 1 TABLET BY MOUTH  TWICE DAILY 180 tablet 3   • ibuprofen (MOTRIN) 800 mg tablet TAKE 1 TABLET BY MOUTH  EVERY 8 HOURS AS NEEDED FOR MILD PAIN OR MODERATE PAIN  TAKE WITH FOOD (Patient taking differently: Take 800 mg by mouth 2 (two) times a day) 270 tablet 3   • magnesium gluconate (MAGONATE) 500 mg tablet Take 500 mg by mouth daily     • methocarbamol (Robaxin-750) 750 mg tablet Take 1 tablet (750 mg total) by mouth every 6 (six) hours as needed for muscle spasms 40 tablet 0   • methotrexate 2 5 MG tablet TAKE EIGHT TABLETS BY MOUTH ONCE A WEEK  take all 8 tablets on the same day every week 96 tablet 2   • multivitamin (THERAGRAN) TABS Take 1 tablet by mouth daily     • phentermine 15 MG capsule TAKE ONE CAPSULE BY MOUTH DAILY IN MORNING 30 capsule 0   • phentermine 30 MG capsule Take 1 capsule (30 mg total) by mouth every morning 30 capsule 2   • promethazine-codeine (PHENERGAN WITH CODEINE) 6 25-10 mg/5 mL syrup Take 5 mL by mouth every 4 (four) hours as needed for cough 180 mL 0   • pyridoxine (B-6) 100 MG tablet Take 100 mg by mouth     • Tocilizumab (Actemra ACTPen) 162 MG/0 9ML SOAJ Inject 162 mg under the skin every 7 days 4 mL 11   • vitamin B-12 (VITAMIN B-12) 1,000 mcg tablet Take by mouth daily     • zinc gluconate 50 mg tablet Take 50 mg by mouth daily       No current facility-administered medications for this visit           Health Maintenance     Health Maintenance   Topic Date Due   • Pneumococcal Vaccine: Pediatrics (0 to 5 Years) and At-Risk Patients (6 to 59 Years) (1 - PCV) Never done   • Depression Follow-up Plan  Never done   • HIV Screening  Never done   • DTaP,Tdap,and Td Vaccines (1 - Tdap) Never done   • Cervical Cancer Screening  Never done   • Breast Cancer Screening: Mammogram  Never done   • Colorectal Cancer Screening  Never done   • COVID-19 Vaccine (2 - Moderna risk series) 03/16/2022   • Annual Physical  01/28/2023   • Influenza Vaccine (Season Ended) 09/01/2023   • BMI: Followup Plan  03/20/2024   • Depression Screening  05/19/2024   • BMI: Adult  05/19/2024   • Hepatitis C Screening  Completed   • HIB Vaccine  Aged Out   • IPV Vaccine  Aged Out   • Hepatitis A Vaccine  Aged Out   • Meningococcal ACWY Vaccine  Aged Out   • HPV Vaccine  Aged Out     Immunization History   Administered Date(s) Administered   • COVID-19 MODERNA VACC 0 5 ML IM 02/16/2022          Gonzalo Garcia DO

## 2023-05-24 ENCOUNTER — TELEPHONE (OUTPATIENT)
Dept: OTHER | Facility: OTHER | Age: 53
End: 2023-05-24

## 2023-05-24 DIAGNOSIS — B35.4 TINEA CORPORIS: Primary | ICD-10-CM

## 2023-05-24 RX ORDER — KETOCONAZOLE 20 MG/G
CREAM TOPICAL DAILY
Qty: 60 G | Refills: 0 | Status: SHIPPED | OUTPATIENT
Start: 2023-05-24 | End: 2023-06-25

## 2023-05-24 NOTE — TELEPHONE ENCOUNTER
"Pt advised per Dr Freddy Max  \"Stop the Drysol - Ketoconazole Cream ordered  \"    Pt acknowledged instructions  "

## 2023-05-24 NOTE — TELEPHONE ENCOUNTER
Pt called in stating Dr Osei Stone gave her something for arm pits and she developed a rash  She says she has a compromised immune system  She would like to know if there is an antifungal cream OTC that may work better  She's requesting a call back at [phone]

## 2023-06-15 ENCOUNTER — OFFICE VISIT (OUTPATIENT)
Dept: FAMILY MEDICINE CLINIC | Facility: CLINIC | Age: 53
End: 2023-06-15
Payer: COMMERCIAL

## 2023-06-15 VITALS
HEART RATE: 89 BPM | TEMPERATURE: 97.6 F | WEIGHT: 208.2 LBS | RESPIRATION RATE: 16 BRPM | DIASTOLIC BLOOD PRESSURE: 80 MMHG | SYSTOLIC BLOOD PRESSURE: 126 MMHG | OXYGEN SATURATION: 98 % | HEIGHT: 62 IN | BODY MASS INDEX: 38.31 KG/M2

## 2023-06-15 DIAGNOSIS — J01.90 ACUTE SINUSITIS, RECURRENCE NOT SPECIFIED, UNSPECIFIED LOCATION: Primary | ICD-10-CM

## 2023-06-15 DIAGNOSIS — L73.9 FOLLICULITIS: ICD-10-CM

## 2023-06-15 DIAGNOSIS — R60.0 LOWER EXTREMITY EDEMA: ICD-10-CM

## 2023-06-15 PROCEDURE — 99214 OFFICE O/P EST MOD 30 MIN: CPT | Performed by: FAMILY MEDICINE

## 2023-06-15 RX ORDER — DOXYCYCLINE HYCLATE 100 MG/1
100 CAPSULE ORAL EVERY 12 HOURS SCHEDULED
Qty: 20 CAPSULE | Refills: 0 | Status: SHIPPED | OUTPATIENT
Start: 2023-06-15 | End: 2023-06-25

## 2023-06-15 RX ORDER — FUROSEMIDE 20 MG/1
20 TABLET ORAL 2 TIMES DAILY
Qty: 60 TABLET | Refills: 2 | Status: SHIPPED | OUTPATIENT
Start: 2023-06-15 | End: 2023-09-13

## 2023-06-15 NOTE — ASSESSMENT & PLAN NOTE
47yo female presenting c/o 3 days leg swelling and pruritic rash  Reports h/o RA on steroids, will have intermittent LE edema  On exam, diffuse erythematous lesions approx 0 25-0 5cm in diameter with excoriations  Of note, [place]      Symptoms likely 2/2 folliculitis    Plan:  - Will prescribe doxycycline 100mg BID x10 days, pharmacy confirmed  - Given h/o LE edema, will prescribe Lasix 20mg QAM as needed, pharmacy confirmed

## 2023-06-15 NOTE — ASSESSMENT & PLAN NOTE
47yo female presenting c/o 5 days rhinorrhea, congestion, chills, subjective fevers  Tolerating po intake and keeping hydrated  VSS, not in respiratory distress      Plan:  - Will prescribe doxycycline 100mg BID x10 days, pharmacy confirmed  - F/u PRN

## 2023-06-15 NOTE — PROGRESS NOTES
Name: Alton Pride      : 1970      MRN: [id number]  Encounter Provider: Scott Cao DO  Encounter Date: 6/15/2023   Encounter department: [place] [address]  Acute sinusitis, recurrence not specified, unspecified location  Assessment & Plan:  49yo female presenting c/o 5 days rhinorrhea, congestion, chills, subjective fevers  Tolerating po intake and keeping hydrated  VSS, not in respiratory distress  Plan:  - Will prescribe doxycycline 100mg BID x10 days, pharmacy confirmed  - F/u PRN    Orders:  -     doxycycline hyclate (VIBRAMYCIN) 100 mg capsule; Take 1 capsule (100 mg total) by mouth every 12 (twelve) hours for 10 days    2  Folliculitis  Assessment & Plan:  49yo female presenting c/o 3 days leg swelling and pruritic rash  Reports h/o RA on steroids, will have intermittent LE edema  On exam, diffuse erythematous lesions approx 0 25-0 5cm in diameter with excoriations  Of note, [place]  Symptoms likely 2/2 folliculitis    Plan:  - Will prescribe doxycycline 100mg BID x10 days, pharmacy confirmed  - Given h/o LE edema, will prescribe Lasix 20mg QAM as needed, pharmacy confirmed    Orders:  -     doxycycline hyclate (VIBRAMYCIN) 100 mg capsule; Take 1 capsule (100 mg total) by mouth every 12 (twelve) hours for 10 days    3  Lower extremity edema  -     furosemide (LASIX) 20 mg tablet; Take 1 tablet (20 mg total) by mouth 2 (two) times a day         Subjective      49yo female presenting with the following complaints:    C/o 5 days of rhinorrhea, congestion, decreased appetite, chills, subjective fevers  Reports sick contact partner with same symptoms that began last Tuesday  Reports decreased appetite, but has been forcing herself to eat some things like fruit  Reports keeping hydrated  Reports a canker sore as well  C/o 3 days leg and feet swelling b/l with associated blisters  Reports pruritis  Denies being bit by anything  OTC diuretic which was used, Diurex, which was helpful  Pt reports h/o RA and was recently taken off the steroids  Swelling comes and goes at different times of the day  Not associated with increased activity  Of note, was recently on vacation and did go into ocean up to her knees  Review of Systems   Constitutional: Positive for appetite change  Negative for chills and fever  HENT: Positive for congestion, rhinorrhea and sinus pressure  Negative for sore throat  Eyes: Negative for visual disturbance  Respiratory: Negative for shortness of breath  Cardiovascular: Negative for chest pain and palpitations  Gastrointestinal: Negative for abdominal pain  Skin: Positive for rash  Current Outpatient Medications on File Prior to Visit   Medication Sig   • albuterol (Ventolin HFA) 90 mcg/act inhaler Inhale 2 puffs every 6 (six) hours as needed for wheezing or shortness of breath   • aluminum chloride (DRYSOL) 20 % external solution Apply topically daily at bedtime   • Ascorbic Acid (vitamin C) 1000 MG tablet Take 1,000 mg by mouth daily   • cholecalciferol (VITAMIN D3) 1,000 units tablet Take 2,000 Units by mouth   • famotidine (PEPCID) 20 mg tablet Take 1 tablet (20 mg total) by mouth 2 (two) times a day   • folic acid (FOLVITE) 1 mg tablet Take 1 tablet (1 mg total) by mouth daily   • hydroxychloroquine (PLAQUENIL) 200 mg tablet TAKE 1 TABLET BY MOUTH  TWICE DAILY   • ibuprofen (MOTRIN) 800 mg tablet TAKE 1 TABLET BY MOUTH  EVERY 8 HOURS AS NEEDED FOR MILD PAIN OR MODERATE PAIN  TAKE WITH FOOD (Patient taking differently: Take 800 mg by mouth 2 (two) times a day)   • ketoconazole (NIZORAL) 2 % cream Apply topically daily   • magnesium gluconate (MAGONATE) 500 mg tablet Take 500 mg by mouth daily   • methocarbamol (Robaxin-750) 750 mg tablet Take 1 tablet (750 mg total) by mouth every 6 (six) hours as needed for muscle spasms   • methotrexate 2 5 MG tablet TAKE EIGHT TABLETS BY MOUTH ONCE A WEEK  "take all 8 tablets on the same day every week   • multivitamin (THERAGRAN) TABS Take 1 tablet by mouth daily   • phentermine 15 MG capsule TAKE ONE CAPSULE BY MOUTH DAILY IN MORNING   • pyridoxine (B-6) 100 MG tablet Take 100 mg by mouth   • Tocilizumab (Actemra ACTPen) 162 MG/0 9ML SOAJ Inject 162 mg under the skin every 7 days   • vitamin B-12 (VITAMIN B-12) 1,000 mcg tablet Take by mouth daily   • zinc gluconate 50 mg tablet Take 50 mg by mouth daily   • [DISCONTINUED] phentermine 30 MG capsule Take 1 capsule (30 mg total) by mouth every morning   • [DISCONTINUED] promethazine-codeine (PHENERGAN WITH CODEINE) 6 25-10 mg/5 mL syrup Take 5 mL by mouth every 4 (four) hours as needed for cough       Objective     /80 (BP Location: Left arm, Patient Position: Sitting, Cuff Size: Large)   Pulse 89   Temp 97 6 °F (36 4 °C) (Tympanic)   Resp 16   Ht 5' 2\" (1 575 m)   Wt 94 4 kg (208 lb 3 2 oz)   SpO2 98%   BMI 38 08 kg/m²     Physical Exam  Constitutional:       Appearance: She is obese  HENT:      Head: Normocephalic and atraumatic  Nose: Congestion and rhinorrhea present  Cardiovascular:      Rate and Rhythm: Normal rate and regular rhythm  Heart sounds: Normal heart sounds  Pulmonary:      Effort: Pulmonary effort is normal       Breath sounds: Normal breath sounds  No wheezing or rales  Abdominal:      Tenderness: There is no abdominal tenderness  Musculoskeletal:         General: No swelling  Lymphadenopathy:      Cervical: No cervical adenopathy  Skin:     General: Skin is warm and dry  Findings: Rash present  Comments: Diffuse lesions approx 0 25-0 5cm in diameter from knees down to legs b/l  Some excoriations and scabbing  Neurological:      Mental Status: She is alert     Psychiatric:         Mood and Affect: Mood normal          Behavior: Behavior normal           Yuli Linder, DO PGY-2  Family Medicine    "

## 2023-06-16 ENCOUNTER — TELEPHONE (OUTPATIENT)
Dept: RHEUMATOLOGY | Facility: CLINIC | Age: 53
End: 2023-06-16

## 2023-06-16 NOTE — TELEPHONE ENCOUNTER
Please check with patient on how her RA is doing off Actemra, does she want to just continue to hold off until I see her next month or does she want a different biologic?

## 2023-06-19 NOTE — TELEPHONE ENCOUNTER
Caller: Patient    Doctor: Vania Craig     Reason for call: Patient said she is not doing well   She is still having stiffness and flare ups     Call back#: [phone]

## 2023-06-21 DIAGNOSIS — M05.79 RHEUMATOID ARTHRITIS INVOLVING MULTIPLE SITES WITH POSITIVE RHEUMATOID FACTOR (HCC): Primary | ICD-10-CM

## 2023-06-21 RX ORDER — PREDNISONE 5 MG/1
TABLET ORAL
Qty: 70 TABLET | Refills: 0 | Status: SHIPPED | OUTPATIENT
Start: 2023-06-21

## 2023-06-24 DIAGNOSIS — B35.4 TINEA CORPORIS: ICD-10-CM

## 2023-06-25 RX ORDER — KETOCONAZOLE 20 MG/G
CREAM TOPICAL DAILY
Qty: 60 G | Refills: 0 | Status: SHIPPED | OUTPATIENT
Start: 2023-06-25

## 2023-07-08 DIAGNOSIS — M05.79 RHEUMATOID ARTHRITIS INVOLVING MULTIPLE SITES WITH POSITIVE RHEUMATOID FACTOR (HCC): ICD-10-CM

## 2023-07-10 DIAGNOSIS — M05.79 RHEUMATOID ARTHRITIS INVOLVING MULTIPLE SITES WITH POSITIVE RHEUMATOID FACTOR (HCC): Primary | ICD-10-CM

## 2023-07-10 DIAGNOSIS — Z79.899 HIGH RISK MEDICATION USE: ICD-10-CM

## 2023-07-10 RX ORDER — METHOTREXATE 2.5 MG/1
TABLET ORAL
Qty: 96 TABLET | Refills: 0 | OUTPATIENT
Start: 2023-07-10

## 2023-07-11 ENCOUNTER — TELEPHONE (OUTPATIENT)
Dept: RHEUMATOLOGY | Facility: CLINIC | Age: 53
End: 2023-07-11

## 2023-07-11 NOTE — TELEPHONE ENCOUNTER
Patient was called and told her request for her medication was denied due to the fact her lab work had not been completed since Dec. Of 2022. New orders were placed into her chart and she will have the blood work completed tomorrow.

## 2023-07-12 ENCOUNTER — APPOINTMENT (OUTPATIENT)
Dept: LAB | Facility: CLINIC | Age: 53
End: 2023-07-12
Payer: COMMERCIAL

## 2023-07-12 LAB
ALBUMIN SERPL BCP-MCNC: 4.3 G/DL (ref 3.5–5)
ALP SERPL-CCNC: 57 U/L (ref 34–104)
ALT SERPL W P-5'-P-CCNC: 16 U/L (ref 7–52)
ANION GAP SERPL CALCULATED.3IONS-SCNC: 3 MMOL/L
AST SERPL W P-5'-P-CCNC: 15 U/L (ref 13–39)
BASOPHILS # BLD AUTO: 0.08 THOUSANDS/ÂΜL (ref 0–0.1)
BASOPHILS NFR BLD AUTO: 1 % (ref 0–1)
BILIRUB SERPL-MCNC: 0.26 MG/DL (ref 0.2–1)
BUN SERPL-MCNC: 20 MG/DL (ref 5–25)
CALCIUM SERPL-MCNC: 9.3 MG/DL (ref 8.4–10.2)
CHLORIDE SERPL-SCNC: 108 MMOL/L (ref 96–108)
CO2 SERPL-SCNC: 27 MMOL/L (ref 21–32)
CREAT SERPL-MCNC: 1.01 MG/DL (ref 0.6–1.3)
CRP SERPL QL: 5.9 MG/L
EOSINOPHIL # BLD AUTO: 0.17 THOUSAND/ÂΜL (ref 0–0.61)
EOSINOPHIL NFR BLD AUTO: 2 % (ref 0–6)
ERYTHROCYTE [DISTWIDTH] IN BLOOD BY AUTOMATED COUNT: 12.5 % (ref 11.6–15.1)
ERYTHROCYTE [SEDIMENTATION RATE] IN BLOOD: 11 MM/HOUR (ref 0–29)
GFR SERPL CREATININE-BSD FRML MDRD: 63 ML/MIN/1.73SQ M
GLUCOSE SERPL-MCNC: 95 MG/DL (ref 65–140)
HCT VFR BLD AUTO: 35.1 % (ref 34.8–46.1)
HGB BLD-MCNC: 11.7 G/DL (ref 11.5–15.4)
IMM GRANULOCYTES # BLD AUTO: 0.03 THOUSAND/UL (ref 0–0.2)
IMM GRANULOCYTES NFR BLD AUTO: 0 % (ref 0–2)
LYMPHOCYTES # BLD AUTO: 1.46 THOUSANDS/ÂΜL (ref 0.6–4.47)
LYMPHOCYTES NFR BLD AUTO: 16 % (ref 14–44)
MCH RBC QN AUTO: 30.6 PG (ref 26.8–34.3)
MCHC RBC AUTO-ENTMCNC: 33.3 G/DL (ref 31.4–37.4)
MCV RBC AUTO: 92 FL (ref 82–98)
MONOCYTES # BLD AUTO: 0.79 THOUSAND/ÂΜL (ref 0.17–1.22)
MONOCYTES NFR BLD AUTO: 9 % (ref 4–12)
NEUTROPHILS # BLD AUTO: 6.43 THOUSANDS/ÂΜL (ref 1.85–7.62)
NEUTS SEG NFR BLD AUTO: 72 % (ref 43–75)
NRBC BLD AUTO-RTO: 0 /100 WBCS
PLATELET # BLD AUTO: 339 THOUSANDS/UL (ref 149–390)
PMV BLD AUTO: 9.3 FL (ref 8.9–12.7)
POTASSIUM SERPL-SCNC: 4.1 MMOL/L (ref 3.5–5.3)
PROT SERPL-MCNC: 7.1 G/DL (ref 6.4–8.4)
RBC # BLD AUTO: 3.82 MILLION/UL (ref 3.81–5.12)
SODIUM SERPL-SCNC: 138 MMOL/L (ref 135–147)
WBC # BLD AUTO: 8.96 THOUSAND/UL (ref 4.31–10.16)

## 2023-07-12 PROCEDURE — 36415 COLL VENOUS BLD VENIPUNCTURE: CPT | Performed by: PHYSICIAN ASSISTANT

## 2023-07-12 PROCEDURE — 85652 RBC SED RATE AUTOMATED: CPT | Performed by: PHYSICIAN ASSISTANT

## 2023-07-12 PROCEDURE — 80053 COMPREHEN METABOLIC PANEL: CPT | Performed by: PHYSICIAN ASSISTANT

## 2023-07-12 PROCEDURE — 86140 C-REACTIVE PROTEIN: CPT | Performed by: PHYSICIAN ASSISTANT

## 2023-07-12 PROCEDURE — 85025 COMPLETE CBC W/AUTO DIFF WBC: CPT | Performed by: PHYSICIAN ASSISTANT

## 2023-07-13 NOTE — TELEPHONE ENCOUNTER
Caller: PATIENT    Doctor: Aman Kat    Reason for call: pt called stating [facility]   She is calling for a refill on her Methotrexate    Call back#: [phone]

## 2023-07-14 ENCOUNTER — TELEPHONE (OUTPATIENT)
Dept: RHEUMATOLOGY | Facility: CLINIC | Age: 53
End: 2023-07-14

## 2023-07-14 DIAGNOSIS — M05.79 RHEUMATOID ARTHRITIS INVOLVING MULTIPLE SITES WITH POSITIVE RHEUMATOID FACTOR (HCC): ICD-10-CM

## 2023-07-14 RX ORDER — METHOTREXATE 2.5 MG/1
TABLET ORAL
Qty: 96 TABLET | Refills: 2 | Status: SHIPPED | OUTPATIENT
Start: 2023-07-14

## 2023-07-14 NOTE — TELEPHONE ENCOUNTER
Please let her know that I sent the methotrexate refills to [facility] [place], does she want it sent to a different pharmacy?

## 2023-07-26 ENCOUNTER — OFFICE VISIT (OUTPATIENT)
Dept: RHEUMATOLOGY | Facility: CLINIC | Age: 53
End: 2023-07-26
Payer: COMMERCIAL

## 2023-07-26 VITALS
DIASTOLIC BLOOD PRESSURE: 74 MMHG | HEIGHT: 62 IN | BODY MASS INDEX: 38.28 KG/M2 | WEIGHT: 208 LBS | SYSTOLIC BLOOD PRESSURE: 118 MMHG

## 2023-07-26 DIAGNOSIS — M17.12 OSTEOARTHRITIS OF LEFT KNEE, UNSPECIFIED OSTEOARTHRITIS TYPE: ICD-10-CM

## 2023-07-26 DIAGNOSIS — M05.79 RHEUMATOID ARTHRITIS INVOLVING MULTIPLE SITES WITH POSITIVE RHEUMATOID FACTOR (HCC): Primary | ICD-10-CM

## 2023-07-26 DIAGNOSIS — Z79.899 HIGH RISK MEDICATION USE: ICD-10-CM

## 2023-07-26 DIAGNOSIS — Z79.631 METHOTREXATE, LONG TERM, CURRENT USE: ICD-10-CM

## 2023-07-26 PROCEDURE — 99215 OFFICE O/P EST HI 40 MIN: CPT | Performed by: INTERNAL MEDICINE

## 2023-07-26 PROCEDURE — 20610 DRAIN/INJ JOINT/BURSA W/O US: CPT | Performed by: INTERNAL MEDICINE

## 2023-07-26 RX ORDER — LIDOCAINE HYDROCHLORIDE 10 MG/ML
1 INJECTION, SOLUTION INFILTRATION; PERINEURAL ONCE
Status: COMPLETED | OUTPATIENT
Start: 2023-07-26 | End: 2023-07-26

## 2023-07-26 RX ORDER — TRIAMCINOLONE ACETONIDE 40 MG/ML
40 INJECTION, SUSPENSION INTRA-ARTICULAR; INTRAMUSCULAR ONCE
Status: COMPLETED | OUTPATIENT
Start: 2023-07-26 | End: 2023-07-26

## 2023-07-26 RX ORDER — FOLIC ACID 1 MG/1
1 TABLET ORAL DAILY
Qty: 90 TABLET | Refills: 3 | Status: SHIPPED | OUTPATIENT
Start: 2023-07-26

## 2023-07-26 RX ORDER — HYDROXYCHLOROQUINE SULFATE 200 MG/1
200 TABLET, FILM COATED ORAL 2 TIMES DAILY
Qty: 180 TABLET | Refills: 3 | Status: SHIPPED | OUTPATIENT
Start: 2023-07-26 | End: 2024-07-20

## 2023-07-26 RX ORDER — FAMOTIDINE 20 MG/1
20 TABLET, FILM COATED ORAL 2 TIMES DAILY
Qty: 180 TABLET | Refills: 3 | Status: SHIPPED | OUTPATIENT
Start: 2023-07-26

## 2023-07-26 RX ORDER — IBUPROFEN 800 MG/1
800 TABLET ORAL 2 TIMES DAILY
Qty: 180 TABLET | Refills: 3 | Status: SHIPPED | OUTPATIENT
Start: 2023-07-26

## 2023-07-26 RX ADMIN — TRIAMCINOLONE ACETONIDE 40 MG: 40 INJECTION, SUSPENSION INTRA-ARTICULAR; INTRAMUSCULAR at 15:00

## 2023-07-26 RX ADMIN — LIDOCAINE HYDROCHLORIDE 1 ML: 10 INJECTION, SOLUTION INFILTRATION; PERINEURAL at 15:00

## 2023-07-26 NOTE — PROGRESS NOTES
Assessment and Plan:   46 y.o. female presenting for seropositive RA follow up. Patient's rheumatoid arthritis is still not controlled on her current regimen. Has several hours of morning stiffness. Actemra was causing canker sores; has been it for 7 months. Would be a good candidate to switch from Actemra to Rinvoq 15mg po daily. Has so far tried and failed or had adverse effects to Humira, Orencia, and now Actemra; is currently on hydroxychloroquine and methotrexate. Left knee osteoarthritis has been bothersome; steroid injection administered in clinic. Continue ibuprofen 800mg 2 times a day as needed for joint pain  Continue famotidine twice a day  Continue folic acid daily  Continue methotrexate 8 tabs once a week  Continue Vit. D daily  Continue hydroxychloroquine twice a day; continue regular eye exams, has one coming up  Will work on prior auth for Rinvoq daily pill  Left knee steroid injection given in clinic today  Do labs before next visit     Return to clinic in 4 months     Plan:  Diagnoses and all orders for this visit:    Rheumatoid arthritis involving multiple sites with positive rheumatoid factor (HCC)  -     ibuprofen (MOTRIN) 800 mg tablet; Take 1 tablet (800 mg total) by mouth 2 (two) times a day  -     hydroxychloroquine (PLAQUENIL) 200 mg tablet; Take 1 tablet (200 mg total) by mouth 2 (two) times a day  -     folic acid (FOLVITE) 1 mg tablet; Take 1 tablet (1 mg total) by mouth daily  -     CBC and differential  -     Comprehensive metabolic panel  -     C-reactive protein  -     Sedimentation rate, automated  -     famotidine (PEPCID) 20 mg tablet;  Take 1 tablet (20 mg total) by mouth 2 (two) times a day    Osteoarthritis of left knee, unspecified osteoarthritis type  -     triamcinolone acetonide (KENALOG-40) 40 mg/mL injection 40 mg  -     lidocaine (XYLOCAINE) 1 % injection 1 mL    Methotrexate, long term, current use    High risk medication use  -     folic acid (FOLVITE) 1 mg tablet; Take 1 tablet (1 mg total) by mouth daily  -     CBC and differential  -     Comprehensive metabolic panel     High risk medication use - Benefits and risks of hydroxychloroquine, including but not limited to retinal toxicity, corneal deposits, gastrointestinal side effects, and headaches were previously discussed with the patient. She is aware of the need for a regular eye exam to monitor for ocular toxicity while on this medication. Benefits and risks of methotrexate use, including but not limited to gastrointestinal disturbances such as diarrhea, hair loss, fatigue, stomatitis, leukopenia, lung hypersensitivity reaction, hepatotoxicity, and lymphoma were previously discussed with the patient. Baseline viral hepatitis panel was ordered and returned negative.  CBC,CMP will be regularly monitored. Patient does not plan on having any children. Patient was prescribed Folic Acid 1 mg po daily to take while on methotrexate to help prevent side effects. Patient counseled on abstinence from alcohol while using methotrexate.     Benefits and risks of MISSAEL inhibitor biologic agents like Rinvoq were discussed, and include but are not limited to reactivation of hepatitis B/C or TB, diverticular perforation, hyperlipidemia, hepatotoxicity, VTE, and increased risk of infections. A baseline viral hepatitis panel and TB test was checked. CBC/CMP and lipid panel will be monitored regularly. Large joint arthrocentesis: L knee  Universal Protocol:  Consent: Verbal consent obtained. Written consent not obtained. Risks and benefits: risks, benefits and alternatives were discussed  Consent given by: patient  Time out: Immediately prior to procedure a "time out" was called to verify the correct patient, procedure, equipment, support staff and site/side marked as required.   Timeout called at: 7/26/2023 2:30 PM.  Patient understanding: patient states understanding of the procedure being performed  Patient consent: the patient's understanding of the procedure matches consent given  Procedure consent: procedure consent matches procedure scheduled  Relevant documents: relevant documents present and verified  Test results: test results available and properly labeled  Site marked: the operative site was marked  Radiology Images displayed and confirmed. If images not available, report reviewed: imaging studies available  Required items: required blood products, implants, devices, and special equipment available  Patient identity confirmed: verbally with patient    Supporting Documentation  Indications: pain and joint swelling   Procedure Details  Location: knee - L knee  Preparation: Patient was prepped and draped in the usual sterile fashion  Needle size: 25 G  Ultrasound guidance: no  Approach: anterolateral    Patient tolerance: patient tolerated the procedure well with no immediate complications  Dressing:  Sterile dressing applied    After discussing the risks/benefits of left knee steroid injection, including minor risk of infection, bleeding, pain, or ineffectiveness, informed consent was obtained and patient was agreeable to proceed. Using sterile technique, the left knee was prepped with Chloraprep, and was topically anesthetized with Ethyl Chloride. The joint was entered using an anterolateral approach and Kenalog 40 mg and 1 mL lidocaine 1% were then injected and the needle withdrawn. The procedure was well-tolerated. Patient was instructed to call our office with any concerns or questions. Follow-up plan: RTC in 4 months        Rheumatic Disease Summary:  Melissa Conley a 46 y. o. female who originally presented on 5/13/20 via telemedicine to establish care for her newly diagnosed seropositive (RF+, anti-CCP+) Rheumatoid arthritis.  Patient had migratory early RA symptoms, which significantly improved with prednisone.  Asked her to continue her prednisone course prescribed by PCP.   Initiated her on DMARD therapy with weight-based hydroxychloroquine 200mg po bid; asked patient to get regular eye exams while on this medication to monitor for Plaquenil-related retinal toxicity.  Ophthalmology referral made per patient's request. Jeni Sánchez chose this more benign DMARD therapy over starting methotrexate during this time in the pandemic.  Ordered hand x-rays for patient to do when she gets the chance to have her baseline.  Asked patient to call or message clinic with any RA flare symptoms.       She presented for follow-up on 08/19/2020. At that time, her RA had not come under control with three months of hydroxychloroquine alone, so added on methotrexate 15mg po weekly with daily folic acid. Bilateral hand x-rays returned unremarkable. Since naproxen and meloxicam had not helped patient's breakthrough joint pain in the past, prescribed diclofenac 75mg po bid prn instead. Kenalog 80mg IM steroid injection administered in clinic to treat her active RA symptoms, which had been affecting her ability to work; filled out [employer] paperwork in clinic (patient works as a prison cook).    She then presented on 11/24/2020 to Dr. Booker Petersen for follow-up. Was having flare-ups with her right knee, and was put on prednisone p.r.n. at the time. No other medication changes were made.     2/3/21:  Patient's rheumatoid arthritis was not coming under control with continued methotrexate 15 mg p.o. weekly and hydroxychloroquine 200 mg p.o. b.i.d.. She still needed to take prednisone 10mg p.r.n. for knee flare ups. Decided to increase patient's methotrexate to 20 mg PO weekly with daily folic acid. She was also to continue hydroxychloroquine 200 mg p.o. b.i.d., and would like to continue diclofenac 75 mg p.o. b.i.d.. Renewed her prednisone 10 mg p.o. daily as needed while awaiting for the higher dose of methotrexate to take effect.   In 2 months, planned to discuss whether advance in therapy to triple therapy with addition of sulfasalazine, or addition of a biologic agent is needed at the time. Asked patient to bring up her eye floaters issue to her eye doctor; she had a normal baseline eye exam before starting hydroxychloroquine. 4/14/21: Patient mainly complains of left knee pain and swelling lately, worse when she is on her legs. Knee x-rays ordered reveal severe OA of left knee. Left knee steroid injection given in clinic, which patient tolerated well. Routine monitoring labs ordered and completed. Will continue patient on same medications, since her main symptomatic joint is likely due to her severe OA in left knee rather than RA, which was addressed to today with steroid injection. Continue methotrexate 20mg po weekly with daily folic acid and hydroxychloroquine 200mg po bid; patient is aware to get regular eye exams. Emphasized to patient that she can take the diclofenac as needed instead of regularly. For her left calf numbness, prescribed gabapentin 100mg po tid prn.    7/14/21:Symptoms much improved after adding methotrexate, and performing L knee steroid injection at last visit, which was completed again today since joint pain and swelling symptoms just started returning a few days ago. Increased gabapentin to 300 mg po TID for left leg numbness; will be seeing pain management shortly to address her lumbosacral radiculopathy causing the left leg numbness. Can also use diclofenac gel as needed for painful joints. She is to continue hydroxychloroquine 200 mg p.o. b.i.d., methotrexate 20 mg p.o. weekly with folic acid 1 mg p.o. daily, and diclofenac 75 mg p.o. b.i.d. as needed for joint pain. Ordered routine monitoring labs; CRP has significantly improved. 10/13/21: Recommended physical therapy for likely left knee OA as well as referral to orthopedics for evaluation for hyaluronic acid gel injections.  Symptoms much improved after performing L knee steroid injection at visit prior, which was completed again this visit since joint pain began worsening recently. She was to continue methotrexate 20 mg p.o. weekly with folic acid 1 mg p.o. daily, and diclofenac 75 mg p.o. b.i.d. and diclofenac gel as needed for joint pain. Discontinued hydroxychloroquine as this was not helping her symptoms previously and the methotrexate was likely what was controlling her symptoms. Discontinue gabapentin given patient's side effects to this medication. She was to continue daily Vit. D.     2/16/22: Patient stated that her joint pain and morning stiffness are getting worse. Discussed with patient the benefits and risks of biological agents (anti-TNF inhibitors). Instruction for injection. Patient is going to have her covid-19 vaccine. Continue diclofenac twice a day as needed for joint pain  Continue diclofenac gel as needed for joint pain  Continue folic acid daily  Continue methotrexate 8 tabs once a week; hold one methotrexate dose after COVID vaccine  Can take Vit. D daily  Stop hydroxychloroquine since not helping  Will work on prior auth for Humira every other week injection  Do labs before next visit  [facility] paperwork filled out    7/6/22: Humira has made her RA symptoms worse. Stop diclofenac, can take ibuprofen 800mg up to 3 times a day as needed for joint pain. Take famotidine twice a day if taking ibuprofen regularly to help protect stomach. Continue folic acid daily. Continue methotrexate 8 tabs once a week. Take Vit. D daily. Restart hydroxychloroquine twice a day; get regular eye exams. Terence Brooke was approved next instead of Enbrel    12/7/22: RA not controlled. She has been having full body flareups. Is up-to-date on her eye exams. Admits to stiffness all day. Terence Brooke has not been any better than Humira. Is willing to try Actemra.   Continue ibuprofen 800mg up to 3 times a day as needed for joint pain  Continue famotidine twice a day if taking ibuprofen regularly to help protect stomach  Continue folic acid daily  Continue methotrexate 8 tabs once a week  Continue Vit. D daily  Continue hydroxychloroquine twice a day; get regular eye exams  Will work on prior auth for Actemra weekly injections instead of Orencia  Take prednisone taper during a flare-up  Do labs today    AC Dempsey is a 46 y.o.  female who presents for RA follow up. Last clinic visit was 12/7/22. Patient's rheumatoid arthritis is still uncontrolled on Actemra. Also it was causing canker sores. She took 5 mg prednisone this morning. She is getting an eye exam soon. Has been taking ibuprofen twice a day. Still having some left knee swelling and discomfort. Wants to try a different biologic agent. The following portions of the patient's history were reviewed and updated as appropriate: allergies, current medications, past family history, past medical history, past social history, past surgical history and problem list.    Review of Systems:   Review of Systems   Constitutional: Negative for chills and fever. HENT: Negative for ear pain and sore throat. Eyes: Negative for pain and visual disturbance. Respiratory: Negative for cough and shortness of breath. Cardiovascular: Negative for chest pain and palpitations. Gastrointestinal: Negative for abdominal pain and vomiting. Genitourinary: Negative for dysuria and hematuria. Musculoskeletal: Positive for arthralgias. Negative for back pain. Skin: Negative for color change and rash. Neurological: Negative for seizures and syncope. All other systems reviewed and are negative.       Home Medications:     Current Outpatient Medications:   •  albuterol (Ventolin HFA) 90 mcg/act inhaler, Inhale 2 puffs every 6 (six) hours as needed for wheezing or shortness of breath, Disp: 18 g, Rfl: 1  •  aluminum chloride (DRYSOL) 20 % external solution, Apply topically daily at bedtime, Disp: 60 mL, Rfl: 1  •  Ascorbic Acid (vitamin C) 1000 MG tablet, Take 1,000 mg by mouth daily, Disp: , Rfl:   •  cholecalciferol (VITAMIN D3) 1,000 units tablet, Take 2,000 Units by mouth, Disp: , Rfl:   •  famotidine (PEPCID) 20 mg tablet, Take 1 tablet (20 mg total) by mouth 2 (two) times a day, Disp: 180 tablet, Rfl: 3  •  folic acid (FOLVITE) 1 mg tablet, Take 1 tablet (1 mg total) by mouth daily, Disp: 90 tablet, Rfl: 3  •  furosemide (LASIX) 20 mg tablet, Take 1 tablet (20 mg total) by mouth 2 (two) times a day, Disp: 60 tablet, Rfl: 2  •  hydroxychloroquine (PLAQUENIL) 200 mg tablet, Take 1 tablet (200 mg total) by mouth 2 (two) times a day, Disp: 180 tablet, Rfl: 3  •  ibuprofen (MOTRIN) 800 mg tablet, Take 1 tablet (800 mg total) by mouth 2 (two) times a day, Disp: 180 tablet, Rfl: 3  •  ketoconazole (NIZORAL) 2 % cream, apply topically daily, Disp: 60 g, Rfl: 0  •  magnesium gluconate (MAGONATE) 500 mg tablet, Take 500 mg by mouth daily, Disp: , Rfl:   •  methocarbamol (Robaxin-750) 750 mg tablet, Take 1 tablet (750 mg total) by mouth every 6 (six) hours as needed for muscle spasms, Disp: 40 tablet, Rfl: 0  •  methotrexate 2.5 MG tablet, TAKE EIGHT TABLETS BY MOUTH ONCE A WEEK. take all 8 tablets on the same day every week, Disp: 96 tablet, Rfl: 2  •  multivitamin (THERAGRAN) TABS, Take 1 tablet by mouth daily, Disp: , Rfl:   •  phentermine 15 MG capsule, TAKE ONE CAPSULE BY MOUTH DAILY IN MORNING, Disp: 30 capsule, Rfl: 0  •  predniSONE 5 mg tablet, 4 tabs x7 days, then 3 tabs x7 days, then 2 tabs x7 days, then 1 tab x7 days, then stop., Disp: 70 tablet, Rfl: 0  •  pyridoxine (B-6) 100 MG tablet, Take 100 mg by mouth, Disp: , Rfl:   •  vitamin B-12 (VITAMIN B-12) 1,000 mcg tablet, Take by mouth daily, Disp: , Rfl:   •  zinc gluconate 50 mg tablet, Take 50 mg by mouth daily, Disp: , Rfl:     Current Facility-Administered Medications:   •  lidocaine (XYLOCAINE) 1 % injection 1 mL, 1 mL, Injection, Once, Romi Conroy MD  •  triamcinolone acetonide (KENALOG-40) 40 mg/mL injection 40 mg, 40 mg, Intra-articular, Once, Romi Conroy, MD    Objective:    Vitals:    07/26/23 1423   BP: 118/74   Weight: 94.3 kg (208 lb)   Height: 5' 2" (1.575 m)       Physical Exam  Constitutional:       General: She is not in acute distress. HENT:      Head: Normocephalic and atraumatic. Eyes:      Conjunctiva/sclera: Conjunctivae normal.   Cardiovascular:      Rate and Rhythm: Normal rate and regular rhythm. Heart sounds: S1 normal and S2 normal.      No friction rub. Pulmonary:      Effort: Pulmonary effort is normal. No respiratory distress. Breath sounds: Normal breath sounds. No wheezing, rhonchi or rales. Musculoskeletal:         General: Swelling and tenderness present. Cervical back: Neck supple. Comments: Left knee swelling and tenderness   Skin:     Coloration: Skin is not pale. Neurological:      Mental Status: She is alert. Mental status is at baseline. Psychiatric:         Mood and Affect: Mood normal.         Behavior: Behavior normal.         Reviewed labs and imaging. Imaging:   XR bilateral knee 12/10/21  Right; Mild degenerative changes  Left: Moderate degenerative changes    MRI lumbar spine w wo contrast 6/30/2021  Impression: Central and left paramedian protrusion type disc herniation at L5-S1 contacts and potentially impacts the left S1 nerve root. Correlate for left S1 radiculopathy. Mild degenerative change results were described.      Bilateral Knee x-rays 4/14/21  Right: unremarkable  Left: Tricompartmental osteoarthritic degenerative changes of the left knee with severe medial compartment joint space narrowing.     Bilateral Hand x-rays 8/20/20: unremarkable    Labs:   Orders Only on 07/10/2023   Component Date Value Ref Range Status   • WBC 07/12/2023 8.96  4.31 - 10.16 Thousand/uL Final   • RBC 07/12/2023 3.82  3.81 - 5.12 Million/uL Final   • Hemoglobin 07/12/2023 11.7  11.5 - 15.4 g/dL Final   • Hematocrit 07/12/2023 35.1  34.8 - 46.1 % Final   • MCV 07/12/2023 92  82 - 98 fL Final   • [facility] 07/12/2023 30.6  26.8 - 34.3 pg Final   • MCHC 07/12/2023 33.3  31.4 - 37.4 g/dL Final   • RDW 07/12/2023 12.5  11.6 - 15.1 % Final   • MPV 07/12/2023 9.3  8.9 - 12.7 fL Final   • Platelets 48/08/4144 339  149 - 390 Thousands/uL Final   • nRBC 07/12/2023 0  /100 WBCs Final   • Neutrophils Relative 07/12/2023 72  43 - 75 % Final   • Immat GRANS % 07/12/2023 0  0 - 2 % Final   • Lymphocytes Relative 07/12/2023 16  14 - 44 % Final   • Monocytes Relative 07/12/2023 9  4 - 12 % Final   • Eosinophils Relative 07/12/2023 2  0 - 6 % Final   • Basophils Relative 07/12/2023 1  0 - 1 % Final   • Neutrophils Absolute 07/12/2023 6.43  1.85 - 7.62 Thousands/µL Final   • Immature Grans Absolute 07/12/2023 0.03  0.00 - 0.20 Thousand/uL Final   • Lymphocytes Absolute 07/12/2023 1.46  0.60 - 4.47 Thousands/µL Final   • Monocytes Absolute 07/12/2023 0.79  0.17 - 1.22 Thousand/µL Final   • Eosinophils Absolute 07/12/2023 0.17  0.00 - 0.61 Thousand/µL Final   • Basophils Absolute 07/12/2023 0.08  0.00 - 0.10 Thousands/µL Final   • Sodium 07/12/2023 138  135 - 147 mmol/L Final   • Potassium 07/12/2023 4.1  3.5 - 5.3 mmol/L Final   • Chloride 07/12/2023 108  96 - 108 mmol/L Final   • CO2 07/12/2023 27  21 - 32 mmol/L Final   • ANION GAP 07/12/2023 3  mmol/L Final   • BUN 07/12/2023 20  5 - 25 mg/dL Final   • Creatinine 07/12/2023 1.01  0.60 - 1.30 mg/dL Final    Standardized to IDMS reference method   • Glucose 07/12/2023 95  65 - 140 mg/dL Final    If the patient is fasting, the ADA then defines impaired fasting glucose as > 100 mg/dL and diabetes as > or equal to 123 mg/dL. • Calcium 07/12/2023 9.3  8.4 - 10.2 mg/dL Final   • AST 07/12/2023 15  13 - 39 U/L Final   • ALT 07/12/2023 16  7 - 52 U/L Final    Specimen collection should occur prior to Sulfasalazine administration due to the potential for falsely depressed results.     • Alkaline Phosphatase 07/12/2023 57  34 - 104 U/L Final   • Total Protein 07/12/2023 7.1  6.4 - 8.4 g/dL Final   • Albumin 07/12/2023 4.3  3.5 - 5.0 g/dL Final   • Total Bilirubin 07/12/2023 0.26  0.20 - 1.00 mg/dL Final    Use of this assay is not recommended for patients undergoing treatment with eltrombopag due to the potential for falsely elevated results. N-acetyl-p-benzoquinone imine (metabolite of Acetaminophen) will generate erroneously low results in samples for patients that have taken an overdose of Acetaminophen.    • eGFR 07/12/2023 63  ml/min/1.73sq m Final   • CRP 07/12/2023 5.9 (H)  <3.0 mg/L Final   • Sed Rate 07/12/2023 11  0 - 29 mm/hour Final   Office Visit on 04/14/2023   Component Date Value Ref Range Status   • LEUKOCYTE ESTERASE,UA 04/14/2023 Negative   Final   • NITRITE,UA 04/14/2023 Negative   Final   • SL AMB POCT UROBILINOGEN 04/14/2023 Negative   Final   • POCT URINE PROTEIN 04/14/2023 Negative   Final   •  PH,UA 04/14/2023 6.0   Final   • BLOOD,UA 04/14/2023 Negative   Final   • SPECIFIC GRAVITY,UA 04/14/2023 1.030   Final   • KETONES,UA 04/14/2023 Negative   Final   • BILIRUBIN,UA 04/14/2023 Negative   Final   • GLUCOSE, UA 04/14/2023 Negative   Final   •  COLOR,UA 04/14/2023 Yellow   Final   • CLARITY,UA 04/14/2023 Clear   Final   • Color, UA 04/14/2023 Light Yellow   Final   • Clarity, UA 04/14/2023 Clear   Final   • Specific Gravity, UA 04/14/2023 1.019  1.003 - 1.030 Final   • pH, UA 04/14/2023 5.5  4.5, 5.0, 5.5, 6.0, 6.5, 7.0, 7.5, 8.0 Final   • Leukocytes, UA 04/14/2023 Small (A)  Negative Final   • Nitrite, UA 04/14/2023 Negative  Negative Final   • Protein, UA 04/14/2023 Trace (A)  Negative mg/dl Final   • Glucose, UA 04/14/2023 Negative  Negative mg/dl Final   • Ketones, UA 04/14/2023 Negative  Negative mg/dl Final   • Urobilinogen, UA 04/14/2023 <2.0  <2.0 mg/dl mg/dl Final   • Bilirubin, UA 04/14/2023 Negative  Negative Final   • Occult Blood, UA 04/14/2023 Negative  Negative Final   • Urine Culture 04/14/2023 No Growth <1000 cfu/mL   Final   • RBC, UA 04/14/2023 1-2  None Seen, 1-2 /hpf Final   • WBC, UA 04/14/2023 1-2  None Seen, 1-2 /hpf Final   • Epithelial Cells 04/14/2023 Occasional  None Seen, Occasional /hpf Final   • Bacteria, UA 04/14/2023 None Seen  None Seen, Occasional /hpf Final   • MUCUS THREADS 04/14/2023 Occasional (A)  None Seen Final   Office Visit on 02/13/2023   Component Date Value Ref Range Status   • SARS-CoV-2 02/13/2023 Negative  Negative Final   • INFLUENZA A PCR 02/13/2023 Negative  Negative Final   • INFLUENZA B PCR 02/13/2023 Negative  Negative Final

## 2023-07-26 NOTE — PATIENT INSTRUCTIONS
Continue ibuprofen 800mg 2 times a day as needed for joint pain  Continue famotidine twice a day  Continue folic acid daily  Continue methotrexate 8 tabs once a week  Continue Vit.  D daily  Continue hydroxychloroquine twice a day; get regular eye exams  Will work on prior auth for Rinvoq daily pill  Left knee steroid injection given in clinic today  Do labs before next visit     Return to clinic in 4 months

## 2023-08-14 PROBLEM — J01.90 ACUTE SINUSITIS: Status: RESOLVED | Noted: 2023-06-15 | Resolved: 2023-08-14

## 2023-08-17 ENCOUNTER — TELEPHONE (OUTPATIENT)
Age: 53
End: 2023-08-17

## 2023-08-17 NOTE — TELEPHONE ENCOUNTER
Caller: Patient    Doctor: Kimberly Silvestre    Reason for call: Pt was seen in the office last week and talked about replacing her Orencia with something in pill form instead of injections. Pt is looking for update on what the medication is and if it has been approved.     Call back#: [phone]

## 2023-08-18 DIAGNOSIS — M05.79 RHEUMATOID ARTHRITIS INVOLVING MULTIPLE SITES WITH POSITIVE RHEUMATOID FACTOR (HCC): Primary | ICD-10-CM

## 2023-08-18 NOTE — TELEPHONE ENCOUNTER
Rinvoq PA done on [employer] and approved for 6 months through 2/18/24, can send prescription to [facility].     Patient informed of approval

## 2023-08-18 NOTE — TELEPHONE ENCOUNTER
3 things    Please update patient we're working on the prior auth for Rinvoq  Please administer the steroid injection meds I gave 7/26th so I can close the encounter  Work on prior authorization for patient's Rinvoq (upadacitinib) 15mg po daily for her seropositive rheumatoid arthritis. She has tried and failed or had adverse effects to hydroxychloroquine and methotrexate, Humira, Orencia, and lately Actemra. Has recent negative viral hepatitis panel and TB test on file. Is up to date on her vaccinations and has no active infections. Let me know once approved so I can send the prescription to her specialty pharmacy.

## 2023-08-22 DIAGNOSIS — R63.5 WEIGHT GAIN: ICD-10-CM

## 2023-08-22 RX ORDER — PHENTERMINE HYDROCHLORIDE 15 MG/1
15 CAPSULE ORAL EVERY MORNING
Qty: 30 CAPSULE | Refills: 0 | Status: SHIPPED | OUTPATIENT
Start: 2023-08-22

## 2023-09-12 ENCOUNTER — NURSE TRIAGE (OUTPATIENT)
Age: 53
End: 2023-09-12

## 2023-09-12 ENCOUNTER — TELEPHONE (OUTPATIENT)
Age: 53
End: 2023-09-12

## 2023-09-12 NOTE — TELEPHONE ENCOUNTER
Patient called in to schedule COVID test in office. Visit type was not populating. Management put in IT ticket to add and patient was told she will receive a call back to schedule as soon as possible.

## 2023-09-12 NOTE — TELEPHONE ENCOUNTER
Appt made for covid testing tomorrow. Reason for Disposition  • Health Information question, no triage required and triager able to answer question    Answer Assessment - Initial Assessment Questions  1. REASON FOR CALL or QUESTION: "What is your reason for calling today?" or "How can I best help you?" or "What question do you have that I can help answer?"      Can I get a covid test done for my employer?     Protocols used: INFORMATION ONLY CALL - [facility] TRIAGE[facility]

## 2023-09-12 NOTE — TELEPHONE ENCOUNTER
Regarding: Covid test  ----- Message from Henok Ghosh sent at 9/12/2023 10:41 AM EDT -----  Patient would like to have a covid test for her employer.  She took a home test and tested positive and her employer needs proof of covid test.

## 2023-09-13 ENCOUNTER — CLINICAL SUPPORT (OUTPATIENT)
Dept: FAMILY MEDICINE CLINIC | Facility: CLINIC | Age: 53
End: 2023-09-13

## 2023-09-13 ENCOUNTER — TELEPHONE (OUTPATIENT)
Age: 53
End: 2023-09-13

## 2023-09-13 DIAGNOSIS — U07.1 COVID-19: Primary | ICD-10-CM

## 2023-09-13 PROCEDURE — 87635 SARS-COV-2 COVID-19 AMP PRB: CPT | Performed by: NURSE PRACTITIONER

## 2023-09-13 NOTE — TELEPHONE ENCOUNTER
Patient called to find out if to come into office for covid test or wait in car and call office, advised I would reach out to office to verify protocol and patient hung up on me.

## 2023-09-13 NOTE — PROGRESS NOTES
Name: Will Montenegro      : 1970      MRN: [id number]  Encounter Provider: Kristian Patiño LPN  Encounter Date: 2023   Encounter department: [place] [address]. COVID-19  -     COVID Only- Office Collect           Subjective        Current Outpatient Medications on File Prior to Visit   Medication Sig   • albuterol (Ventolin HFA) 90 mcg/act inhaler Inhale 2 puffs every 6 (six) hours as needed for wheezing or shortness of breath   • aluminum chloride (DRYSOL) 20 % external solution Apply topically daily at bedtime   • Ascorbic Acid (vitamin C) 1000 MG tablet Take 1,000 mg by mouth daily   • cholecalciferol (VITAMIN D3) 1,000 units tablet Take 2,000 Units by mouth   • famotidine (PEPCID) 20 mg tablet Take 1 tablet (20 mg total) by mouth 2 (two) times a day   • folic acid (FOLVITE) 1 mg tablet Take 1 tablet (1 mg total) by mouth daily   • furosemide (LASIX) 20 mg tablet Take 1 tablet (20 mg total) by mouth 2 (two) times a day   • hydroxychloroquine (PLAQUENIL) 200 mg tablet Take 1 tablet (200 mg total) by mouth 2 (two) times a day   • ibuprofen (MOTRIN) 800 mg tablet Take 1 tablet (800 mg total) by mouth 2 (two) times a day   • ketoconazole (NIZORAL) 2 % cream apply topically daily   • magnesium gluconate (MAGONATE) 500 mg tablet Take 500 mg by mouth daily   • methocarbamol (Robaxin-750) 750 mg tablet Take 1 tablet (750 mg total) by mouth every 6 (six) hours as needed for muscle spasms   • methotrexate 2.5 MG tablet TAKE EIGHT TABLETS BY MOUTH ONCE A WEEK. take all 8 tablets on the same day every week   • multivitamin (THERAGRAN) TABS Take 1 tablet by mouth daily   • phentermine 15 MG capsule Take 1 capsule (15 mg total) by mouth every morning   • predniSONE 5 mg tablet 4 tabs x7 days, then 3 tabs x7 days, then 2 tabs x7 days, then 1 tab x7 days, then stop.    • pyridoxine (B-6) 100 MG tablet Take 100 mg by mouth   • Upadacitinib ER 15 MG TB24 Take 15 mg by mouth in the morning   • vitamin B-12 (VITAMIN B-12) 1,000 mcg tablet Take by mouth daily   • zinc gluconate 50 mg tablet Take 50 mg by mouth daily       Objective     Joel Franco LPN

## 2023-09-14 LAB — SARS-COV-2 RNA RESP QL NAA+PROBE: POSITIVE

## 2023-10-23 ENCOUNTER — TELEPHONE (OUTPATIENT)
Dept: PAIN MEDICINE | Facility: CLINIC | Age: 53
End: 2023-10-23

## 2023-10-23 NOTE — TELEPHONE ENCOUNTER
Left message for patient to r/s appointment with Dr. Khan Smicristóbal  to a [facility]

## 2023-11-06 ENCOUNTER — TELEPHONE (OUTPATIENT)
Age: 53
End: 2023-11-06

## 2023-11-06 NOTE — TELEPHONE ENCOUNTER
Caller: Patient    Doctor: Miguel Ángel Kaminski    Reason for call: Patient calling to reschedule appointment from 11/8/2023. Patient had not received message regarding rescheduling and would like to get the appointment back on the books as soon as reasonable. Note: Patient will be unavailable to answer the phone until 1 pm at earliest, or any time on Fridays.     Call back#: [phone]

## 2023-11-15 ENCOUNTER — TELEPHONE (OUTPATIENT)
Age: 53
End: 2023-11-15

## 2023-11-15 NOTE — TELEPHONE ENCOUNTER
Caller: Patient     Doctor: Joselyn Andersen    Reason for call: Asked if labs were ordered, information was given to patient

## 2023-11-16 ENCOUNTER — APPOINTMENT (OUTPATIENT)
Dept: LAB | Facility: CLINIC | Age: 53
End: 2023-11-16
Payer: COMMERCIAL

## 2023-11-16 ENCOUNTER — OFFICE VISIT (OUTPATIENT)
Dept: RHEUMATOLOGY | Facility: CLINIC | Age: 53
End: 2023-11-16
Payer: COMMERCIAL

## 2023-11-16 VITALS — BODY MASS INDEX: 38.04 KG/M2 | DIASTOLIC BLOOD PRESSURE: 82 MMHG | HEIGHT: 62 IN | SYSTOLIC BLOOD PRESSURE: 128 MMHG

## 2023-11-16 DIAGNOSIS — M17.12 OSTEOARTHRITIS OF LEFT KNEE, UNSPECIFIED OSTEOARTHRITIS TYPE: ICD-10-CM

## 2023-11-16 DIAGNOSIS — R60.0 LOWER EXTREMITY EDEMA: ICD-10-CM

## 2023-11-16 DIAGNOSIS — Z79.899 HIGH RISK MEDICATION USE: ICD-10-CM

## 2023-11-16 DIAGNOSIS — Z79.631 METHOTREXATE, LONG TERM, CURRENT USE: ICD-10-CM

## 2023-11-16 DIAGNOSIS — M05.79 RHEUMATOID ARTHRITIS INVOLVING MULTIPLE SITES WITH POSITIVE RHEUMATOID FACTOR (HCC): Primary | ICD-10-CM

## 2023-11-16 LAB
ALBUMIN SERPL BCP-MCNC: 4.6 G/DL (ref 3.5–5)
ALP SERPL-CCNC: 63 U/L (ref 34–104)
ALT SERPL W P-5'-P-CCNC: 15 U/L (ref 7–52)
ANION GAP SERPL CALCULATED.3IONS-SCNC: 7 MMOL/L
AST SERPL W P-5'-P-CCNC: 15 U/L (ref 13–39)
BASOPHILS # BLD AUTO: 0.06 THOUSANDS/ÂΜL (ref 0–0.1)
BASOPHILS NFR BLD AUTO: 1 % (ref 0–1)
BILIRUB SERPL-MCNC: 0.25 MG/DL (ref 0.2–1)
BUN SERPL-MCNC: 18 MG/DL (ref 5–25)
CALCIUM SERPL-MCNC: 9.7 MG/DL (ref 8.4–10.2)
CHLORIDE SERPL-SCNC: 106 MMOL/L (ref 96–108)
CO2 SERPL-SCNC: 26 MMOL/L (ref 21–32)
CREAT SERPL-MCNC: 0.7 MG/DL (ref 0.6–1.3)
CRP SERPL QL: 2.4 MG/L
EOSINOPHIL # BLD AUTO: 0.11 THOUSAND/ÂΜL (ref 0–0.61)
EOSINOPHIL NFR BLD AUTO: 1 % (ref 0–6)
ERYTHROCYTE [DISTWIDTH] IN BLOOD BY AUTOMATED COUNT: 13.5 % (ref 11.6–15.1)
ERYTHROCYTE [SEDIMENTATION RATE] IN BLOOD: 9 MM/HOUR (ref 0–29)
GFR SERPL CREATININE-BSD FRML MDRD: 98 ML/MIN/1.73SQ M
GLUCOSE SERPL-MCNC: 110 MG/DL (ref 65–140)
HCT VFR BLD AUTO: 39.7 % (ref 34.8–46.1)
HGB BLD-MCNC: 12.9 G/DL (ref 11.5–15.4)
IMM GRANULOCYTES # BLD AUTO: 0.03 THOUSAND/UL (ref 0–0.2)
IMM GRANULOCYTES NFR BLD AUTO: 0 % (ref 0–2)
LYMPHOCYTES # BLD AUTO: 1.75 THOUSANDS/ÂΜL (ref 0.6–4.47)
LYMPHOCYTES NFR BLD AUTO: 23 % (ref 14–44)
MCH RBC QN AUTO: 29.8 PG (ref 26.8–34.3)
MCHC RBC AUTO-ENTMCNC: 32.5 G/DL (ref 31.4–37.4)
MCV RBC AUTO: 92 FL (ref 82–98)
MONOCYTES # BLD AUTO: 0.9 THOUSAND/ÂΜL (ref 0.17–1.22)
MONOCYTES NFR BLD AUTO: 12 % (ref 4–12)
NEUTROPHILS # BLD AUTO: 4.75 THOUSANDS/ÂΜL (ref 1.85–7.62)
NEUTS SEG NFR BLD AUTO: 63 % (ref 43–75)
NRBC BLD AUTO-RTO: 0 /100 WBCS
PLATELET # BLD AUTO: 399 THOUSANDS/UL (ref 149–390)
PMV BLD AUTO: 9.5 FL (ref 8.9–12.7)
POTASSIUM SERPL-SCNC: 4.9 MMOL/L (ref 3.5–5.3)
PROT SERPL-MCNC: 7.8 G/DL (ref 6.4–8.4)
RBC # BLD AUTO: 4.33 MILLION/UL (ref 3.81–5.12)
SODIUM SERPL-SCNC: 139 MMOL/L (ref 135–147)
WBC # BLD AUTO: 7.6 THOUSAND/UL (ref 4.31–10.16)

## 2023-11-16 PROCEDURE — 99214 OFFICE O/P EST MOD 30 MIN: CPT | Performed by: INTERNAL MEDICINE

## 2023-11-16 RX ORDER — IBUPROFEN 800 MG/1
800 TABLET ORAL 2 TIMES DAILY
Qty: 180 TABLET | Refills: 3 | Status: SHIPPED | OUTPATIENT
Start: 2023-11-16

## 2023-11-16 RX ORDER — FOLIC ACID 1 MG/1
1 TABLET ORAL DAILY
Qty: 90 TABLET | Refills: 3 | Status: SHIPPED | OUTPATIENT
Start: 2023-11-16

## 2023-11-16 RX ORDER — METHOTREXATE 2.5 MG/1
TABLET ORAL
Qty: 96 TABLET | Refills: 3 | Status: SHIPPED | OUTPATIENT
Start: 2023-11-16

## 2023-11-16 RX ORDER — HYDROXYCHLOROQUINE SULFATE 200 MG/1
200 TABLET, FILM COATED ORAL 2 TIMES DAILY
Qty: 180 TABLET | Refills: 3 | Status: SHIPPED | OUTPATIENT
Start: 2023-11-16 | End: 2024-11-10

## 2023-11-16 RX ORDER — FAMOTIDINE 20 MG/1
20 TABLET, FILM COATED ORAL 2 TIMES DAILY
Qty: 180 TABLET | Refills: 3 | Status: SHIPPED | OUTPATIENT
Start: 2023-11-16

## 2023-11-16 NOTE — PATIENT INSTRUCTIONS
Continue ibuprofen 800mg 2 times a day as needed for joint pain  Continue famotidine twice a day  Continue folic acid daily  Continue methotrexate 8 tabs once a week  Continue Vit.  D daily  Continue hydroxychloroquine twice a day; continue regular eye exams  Continue daily Rinvoq  Do labs before next visit    Return to clinic in 4 months

## 2023-11-16 NOTE — PROGRESS NOTES
Assessment and Plan:  Debra Palumbo is a 46 y.o.  female who presents for follow-up of seropositive rheumatoid arthritis, which has come under much better control since starting Rinvoq few months ago. She admits to half an hour to 40 minutes of morning stiffness. Her left knee will swell up at times, has OA in it; steroid injections and hyaluronic acid injections haven't helped; next step is surgery. .    Continue ibuprofen 800mg 2 times a day as needed for joint pain  Continue famotidine twice a day  Continue folic acid daily  Continue methotrexate 8 tabs once a week  Continue Vit. D daily  Continue hydroxychloroquine twice a day; continue regular eye exams  Continue daily Rinvoq 15mg po   Do labs before next visit    Return to clinic in 4 months    Plan:  1. Rheumatoid arthritis involving multiple sites with positive rheumatoid factor ([address])  Assessment & Plan: Will place an order for blood for her next visit. Will refill hydroxychloroquine, Rinvoq, methotrexate, folic acid, ibuprofen, and famotidine (Pepcid). Orders:  -     CBC and differential  -     Comprehensive metabolic panel  -     C-reactive protein  -     Sedimentation rate, automated  -     Lipid Panel with Direct LDL reflex  -     methotrexate 2.5 MG tablet; TAKE EIGHT TABLETS BY MOUTH ONCE A WEEK. take all 8 tablets on the same day every week  -     ibuprofen (MOTRIN) 800 mg tablet; Take 1 tablet (800 mg total) by mouth 2 (two) times a day  -     hydroxychloroquine (PLAQUENIL) 200 mg tablet; Take 1 tablet (200 mg total) by mouth 2 (two) times a day  -     folic acid (FOLVITE) 1 mg tablet; Take 1 tablet (1 mg total) by mouth daily  -     famotidine (PEPCID) 20 mg tablet; Take 1 tablet (20 mg total) by mouth 2 (two) times a day    2. Osteoarthritis of left knee, unspecified osteoarthritis type    3. Lower extremity edema    4. Methotrexate, long term, current use    5.  High risk medication use  -     CBC and differential  -     Comprehensive metabolic panel  -     Lipid Panel with Direct LDL reflex  -     folic acid (FOLVITE) 1 mg tablet; Take 1 tablet (1 mg total) by mouth daily     High risk medication use - Benefits and risks of hydroxychloroquine, including but not limited to retinal toxicity, corneal deposits, gastrointestinal side effects, and headaches were previously discussed with the patient. She is aware of the need for a regular eye exam to monitor for ocular toxicity while on this medication. Benefits and risks of methotrexate use, including but not limited to gastrointestinal disturbances such as diarrhea, hair loss, fatigue, stomatitis, leukopenia, lung hypersensitivity reaction, hepatotoxicity, and lymphoma were previously discussed with the patient. Baseline viral hepatitis panel was ordered and returned negative. CBC,CMP will be regularly monitored. Patient does not plan on having any children. Patient was prescribed Folic Acid 1 mg po daily to take while on methotrexate to help prevent side effects. Patient counseled on abstinence from alcohol while using methotrexate. Benefits and risks of MISSAEL inhibitor biologic agents like Rinvoq were discussed, and include but are not limited to reactivation of hepatitis B/C or TB, diverticular perforation, hyperlipidemia, hepatotoxicity, VTE, and increased risk of infections. A baseline viral hepatitis panel and TB test was checked. CBC/CMP and lipid panel will be monitored regularly. RTC in 4 months      Chief Complaint  Follow-up of her seropositive rheumatoid arthritis. Rheumatic Disease Summary  Last visit 7/26/23:   Patient's rheumatoid arthritis is still not controlled on her current regimen. Has several hours of morning stiffness. Actemra was causing canker sores; has been it for 7 months. Would be a good candidate to switch from Actemra to Rinvoq 15mg po daily.  Has so far tried and failed or had adverse effects to Humira, Orencia, and now Actemra; is currently on hydroxychloroquine and methotrexate. Left knee osteoarthritis has been bothersome; steroid injection administered in clinic. Continue ibuprofen 800mg 2 times a day as needed for joint pain  Continue famotidine twice a day  Continue folic acid daily  Continue methotrexate 8 tabs once a week  Continue Vit. D daily  Continue hydroxychloroquine twice a day; continue regular eye exams, has one coming up  Will work on prior auth for Rinvoq daily pill  Left knee steroid injection given in clinic today  Do labs before next visit    HPI  Racheal Sarmiento is a 69-year-old female who is here for follow-up of her rheumatoid arthritis. She consents to the use of [facility] services today. The following portions of the patient's history were reviewed and updated as appropriate: allergies, current medications, past family history, past medical history, past social history, past surgical history, and problem list.    Interval History:    She reports hip pain every morning and notes pain in her lower back and hip area when she is on her feet for more than 4 hours. She denies epidural injection and reports she previously had injection for toe numbness, but notes increased back pain instead. She reports left knee swelling when in activity but not with rest. She was able to do knee flexion and does not feel pressure. She reports mild flareup in her fingers and elbow the other night, 11/14/2023 but overall notes benefit from Rinvoq. She noticed a significant change and knee swelling improvement after 4 days of taking 1 tablet of Rinvoq daily. She previously had knee and gel injection without benefit and was advised for a knee replacement. She has been avoiding in taking ibuprofen since he is concerned with kidney problems. She reports morning stiffness for approximately 30 to 40 minutes and takes ibuprofen as needed, hydroxychloroquine, famotidine twice a day, and folic acid 5 minutes after waking up. She is on methotrexate 8 tablets once a week.  She has done more physical work or activities in the last 6 weeks than approximately in 10 years. She takes vitamin D daily and wishes for a medication refill to her current medications. She denies the current need of prednisone. She has an eye exam appointment on 12/18/2023. She had her 3rd COVID-19 infection in 09/2023 and reports she only had 1 COVID-19 vaccine. Review of Systems:   ROS positive for Joint swelling.     Home Medications:    Current Outpatient Medications:     albuterol (Ventolin HFA) 90 mcg/act inhaler, Inhale 2 puffs every 6 (six) hours as needed for wheezing or shortness of breath, Disp: 18 g, Rfl: 1    Ascorbic Acid (vitamin C) 1000 MG tablet, Take 1,000 mg by mouth daily, Disp: , Rfl:     cholecalciferol (VITAMIN D3) 1,000 units tablet, Take 2,000 Units by mouth, Disp: , Rfl:     famotidine (PEPCID) 20 mg tablet, Take 1 tablet (20 mg total) by mouth 2 (two) times a day, Disp: 180 tablet, Rfl: 3    folic acid (FOLVITE) 1 mg tablet, Take 1 tablet (1 mg total) by mouth daily, Disp: 90 tablet, Rfl: 3    furosemide (LASIX) 20 mg tablet, Take 1 tablet (20 mg total) by mouth 2 (two) times a day (Patient taking differently: Take 20 mg by mouth 2 (two) times a day PRN), Disp: 60 tablet, Rfl: 2    hydroxychloroquine (PLAQUENIL) 200 mg tablet, Take 1 tablet (200 mg total) by mouth 2 (two) times a day, Disp: 180 tablet, Rfl: 3    ibuprofen (MOTRIN) 800 mg tablet, Take 1 tablet (800 mg total) by mouth 2 (two) times a day, Disp: 180 tablet, Rfl: 3    magnesium gluconate (MAGONATE) 500 mg tablet, Take 500 mg by mouth daily, Disp: , Rfl:     methotrexate 2.5 MG tablet, TAKE EIGHT TABLETS BY MOUTH ONCE A WEEK. take all 8 tablets on the same day every week, Disp: 96 tablet, Rfl: 3    multivitamin (THERAGRAN) TABS, Take 1 tablet by mouth daily, Disp: , Rfl:     pyridoxine (B-6) 100 MG tablet, Take 100 mg by mouth, Disp: , Rfl:     Upadacitinib ER 15 MG TB24, Take 15 mg by mouth in the morning, Disp: 30 tablet, Rfl: 11    vitamin B-12 (VITAMIN B-12) 1,000 mcg tablet, Take by mouth daily, Disp: , Rfl:     zinc gluconate 50 mg tablet, Take 50 mg by mouth daily, Disp: , Rfl:     aluminum chloride (DRYSOL) 20 % external solution, Apply topically daily at bedtime (Patient not taking: Reported on 11/16/2023), Disp: 60 mL, Rfl: 1    ketoconazole (NIZORAL) 2 % cream, apply topically daily (Patient not taking: Reported on 11/16/2023), Disp: 60 g, Rfl: 0    methocarbamol (Robaxin-750) 750 mg tablet, Take 1 tablet (750 mg total) by mouth every 6 (six) hours as needed for muscle spasms (Patient not taking: Reported on 11/16/2023), Disp: 40 tablet, Rfl: 0    phentermine 15 MG capsule, Take 1 capsule (15 mg total) by mouth every morning (Patient not taking: Reported on 11/16/2023), Disp: 30 capsule, Rfl: 0    predniSONE 5 mg tablet, 4 tabs x7 days, then 3 tabs x7 days, then 2 tabs x7 days, then 1 tab x7 days, then stop. (Patient not taking: Reported on 11/16/2023), Disp: 70 tablet, Rfl: 0    Objective:    Vitals:    11/16/23 1507   BP: 128/82   Height: 5' 2" (1.575 m)       Physical Exam:  Constitutional:    General: She is not in acute distress. HENT:   Head: Normocephalic and atraumatic. Eyes:   Conjunctiva/sclera: Conjunctivae normal.   Cardiovascular:   Rate and Rhythm: Normal rate and regular rhythm. Heart sounds: S1 normal and S2 normal.   No friction rub. Pulmonary:   Effort: Pulmonary effort is normal. No respiratory distress. Breath sounds: Normal breath sounds. No wheezing, rhonchi, or rales. Musculoskeletal:   General: Very slight swelling on the left knee. Cervical back: Neck supple. Skin:  Coloration: Skin is not pale. Neurological:   Mental Status: She is alert. Mental status is at baseline. Psychiatric:      Mood and Affect: Mood normal.      Behavior: Behavior normal.      I have personally reviewed results with the patient.     Imaging:   XR bilateral knee 12/10/21  Right; Mild degenerative changes  Left: Moderate degenerative changes     MRI lumbar spine w wo contrast 6/30/2021  Impression: Central and left paramedian protrusion type disc herniation at L5-S1 contacts and potentially impacts the left S1 nerve root. Correlate for left S1 radiculopathy. Mild degenerative change results were described. Bilateral Knee x-rays 4/14/21  Right: unremarkable  Left: Tricompartmental osteoarthritic degenerative changes of the left knee with severe medial compartment joint space narrowing.      Bilateral Hand x-rays 8/20/20: unremarkable    Labs:   Clinical Support on 09/13/2023   Component Date Value Ref Range Status    SARS-CoV-2 09/13/2023 Positive (A)  Negative Final   Office Visit on 07/26/2023   Component Date Value Ref Range Status    WBC 11/16/2023 7.60  4.31 - 10.16 Thousand/uL Final    RBC 11/16/2023 4.33  3.81 - 5.12 Million/uL Final    Hemoglobin 11/16/2023 12.9  11.5 - 15.4 g/dL Final    Hematocrit 11/16/2023 39.7  34.8 - 46.1 % Final    MCV 11/16/2023 92  82 - 98 fL Final    MCH 11/16/2023 29.8  26.8 - 34.3 pg Final    MCHC 11/16/2023 32.5  31.4 - 37.4 g/dL Final    RDW 11/16/2023 13.5  11.6 - 15.1 % Final    MPV 11/16/2023 9.5  8.9 - 12.7 fL Final    Platelets 11/81/7803 399 (H)  149 - 390 Thousands/uL Final    nRBC 11/16/2023 0  /100 WBCs Final    Neutrophils Relative 11/16/2023 63  43 - 75 % Final    Immat GRANS % 11/16/2023 0  0 - 2 % Final    Lymphocytes Relative 11/16/2023 23  14 - 44 % Final    Monocytes Relative 11/16/2023 12  4 - 12 % Final    Eosinophils Relative 11/16/2023 1  0 - 6 % Final    Basophils Relative 11/16/2023 1  0 - 1 % Final    Neutrophils Absolute 11/16/2023 4.75  1.85 - 7.62 Thousands/µL Final    Immature Grans Absolute 11/16/2023 0.03  0.00 - 0.20 Thousand/uL Final    Lymphocytes Absolute 11/16/2023 1.75  0.60 - 4.47 Thousands/µL Final    Monocytes Absolute 11/16/2023 0.90  0.17 - 1.22 Thousand/µL Final    Eosinophils Absolute 11/16/2023 0.11  0.00 - 0.61 Thousand/µL Final Basophils Absolute 11/16/2023 0.06  0.00 - 0.10 Thousands/µL Final    Sodium 11/16/2023 139  135 - 147 mmol/L Final    Potassium 11/16/2023 4.9  3.5 - 5.3 mmol/L Final    Chloride 11/16/2023 106  96 - 108 mmol/L Final    CO2 11/16/2023 26  21 - 32 mmol/L Final    ANION GAP 11/16/2023 7  mmol/L Final    BUN 11/16/2023 18  5 - 25 mg/dL Final    Creatinine 11/16/2023 0.70  0.60 - 1.30 mg/dL Final    Standardized to IDMS reference method    Glucose 11/16/2023 110  65 - 140 mg/dL Final    If the patient is fasting, the ADA then defines impaired fasting glucose as > 100 mg/dL and diabetes as > or equal to 123 mg/dL. Calcium 11/16/2023 9.7  8.4 - 10.2 mg/dL Final    AST 11/16/2023 15  13 - 39 U/L Final    ALT 11/16/2023 15  7 - 52 U/L Final    Specimen collection should occur prior to Sulfasalazine administration due to the potential for falsely depressed results. Alkaline Phosphatase 11/16/2023 63  34 - 104 U/L Final    Total Protein 11/16/2023 7.8  6.4 - 8.4 g/dL Final    Albumin 11/16/2023 4.6  3.5 - 5.0 g/dL Final    Total Bilirubin 11/16/2023 0.25  0.20 - 1.00 mg/dL Final    Use of this assay is not recommended for patients undergoing treatment with eltrombopag due to the potential for falsely elevated results. N-acetyl-p-benzoquinone imine (metabolite of Acetaminophen) will generate erroneously low results in samples for patients that have taken an overdose of Acetaminophen.     eGFR 11/16/2023 98  ml/min/1.73sq m Final    CRP 11/16/2023 2.4  <3.0 mg/L Final    Sed Rate 11/16/2023 9  0 - 29 mm/hour Final   Orders Only on 07/10/2023   Component Date Value Ref Range Status    WBC 07/12/2023 8.96  4.31 - 10.16 Thousand/uL Final    RBC 07/12/2023 3.82  3.81 - 5.12 Million/uL Final    Hemoglobin 07/12/2023 11.7  11.5 - 15.4 g/dL Final    Hematocrit 07/12/2023 35.1  34.8 - 46.1 % Final    MCV 07/12/2023 92  82 - 98 fL Final    MCH 07/12/2023 30.6  26.8 - 34.3 pg Final    MCHC 07/12/2023 33.3  31.4 - 37.4 g/dL Final RDW 07/12/2023 12.5  11.6 - 15.1 % Final    MPV 07/12/2023 9.3  8.9 - 12.7 fL Final    Platelets 63/87/1727 339  149 - 390 Thousands/uL Final    nRBC 07/12/2023 0  /100 WBCs Final    Neutrophils Relative 07/12/2023 72  43 - 75 % Final    Immat GRANS % 07/12/2023 0  0 - 2 % Final    Lymphocytes Relative 07/12/2023 16  14 - 44 % Final    Monocytes Relative 07/12/2023 9  4 - 12 % Final    Eosinophils Relative 07/12/2023 2  0 - 6 % Final    Basophils Relative 07/12/2023 1  0 - 1 % Final    Neutrophils Absolute 07/12/2023 6.43  1.85 - 7.62 Thousands/µL Final    Immature Grans Absolute 07/12/2023 0.03  0.00 - 0.20 Thousand/uL Final    Lymphocytes Absolute 07/12/2023 1.46  0.60 - 4.47 Thousands/µL Final    Monocytes Absolute 07/12/2023 0.79  0.17 - 1.22 Thousand/µL Final    Eosinophils Absolute 07/12/2023 0.17  0.00 - 0.61 Thousand/µL Final    Basophils Absolute 07/12/2023 0.08  0.00 - 0.10 Thousands/µL Final    Sodium 07/12/2023 138  135 - 147 mmol/L Final    Potassium 07/12/2023 4.1  3.5 - 5.3 mmol/L Final    Chloride 07/12/2023 108  96 - 108 mmol/L Final    CO2 07/12/2023 27  21 - 32 mmol/L Final    ANION GAP 07/12/2023 3  mmol/L Final    BUN 07/12/2023 20  5 - 25 mg/dL Final    Creatinine 07/12/2023 1.01  0.60 - 1.30 mg/dL Final    Standardized to IDMS reference method    Glucose 07/12/2023 95  65 - 140 mg/dL Final    If the patient is fasting, the ADA then defines impaired fasting glucose as > 100 mg/dL and diabetes as > or equal to 123 mg/dL. Calcium 07/12/2023 9.3  8.4 - 10.2 mg/dL Final    AST 07/12/2023 15  13 - 39 U/L Final    ALT 07/12/2023 16  7 - 52 U/L Final    Specimen collection should occur prior to Sulfasalazine administration due to the potential for falsely depressed results.      Alkaline Phosphatase 07/12/2023 57  34 - 104 U/L Final    Total Protein 07/12/2023 7.1  6.4 - 8.4 g/dL Final    Albumin 07/12/2023 4.3  3.5 - 5.0 g/dL Final    Total Bilirubin 07/12/2023 0.26  0.20 - 1.00 mg/dL Final Use of this assay is not recommended for patients undergoing treatment with eltrombopag due to the potential for falsely elevated results. N-acetyl-p-benzoquinone imine (metabolite of Acetaminophen) will generate erroneously low results in samples for patients that have taken an overdose of Acetaminophen. eGFR 07/12/2023 63  ml/min/1.73sq m Final    CRP 07/12/2023 5.9 (H)  <3.0 mg/L Final    Sed Rate 07/12/2023 11  0 - 29 mm/hour Final       Transcribed for Miranda Padilla MD, by Ty Blanchard on 11/20/23 at 11:41 AM. Powered by [employer].

## 2023-11-16 NOTE — ASSESSMENT & PLAN NOTE
Will place an order for blood for her next visit. Will refill hydroxychloroquine, Rinvoq, methotrexate, folic acid, ibuprofen, and famotidine (Pepcid).

## 2023-12-05 ENCOUNTER — OFFICE VISIT (OUTPATIENT)
Dept: OBGYN CLINIC | Facility: CLINIC | Age: 53
End: 2023-12-05
Payer: COMMERCIAL

## 2023-12-05 VITALS — HEIGHT: 62 IN | BODY MASS INDEX: 38.28 KG/M2 | WEIGHT: 208 LBS

## 2023-12-05 DIAGNOSIS — M17.12 PATELLOFEMORAL ARTHRITIS OF LEFT KNEE: Primary | ICD-10-CM

## 2023-12-05 PROCEDURE — 20610 DRAIN/INJ JOINT/BURSA W/O US: CPT | Performed by: ORTHOPAEDIC SURGERY

## 2023-12-05 PROCEDURE — 99212 OFFICE O/P EST SF 10 MIN: CPT | Performed by: ORTHOPAEDIC SURGERY

## 2023-12-05 RX ORDER — LIDOCAINE HYDROCHLORIDE 10 MG/ML
1 INJECTION, SOLUTION INFILTRATION; PERINEURAL
Status: COMPLETED | OUTPATIENT
Start: 2023-12-05 | End: 2023-12-05

## 2023-12-05 RX ORDER — BETAMETHASONE SODIUM PHOSPHATE AND BETAMETHASONE ACETATE 3; 3 MG/ML; MG/ML
6 INJECTION, SUSPENSION INTRA-ARTICULAR; INTRALESIONAL; INTRAMUSCULAR; SOFT TISSUE
Status: COMPLETED | OUTPATIENT
Start: 2023-12-05 | End: 2023-12-05

## 2023-12-05 RX ORDER — METHYLPREDNISOLONE 4 MG/1
TABLET ORAL
Qty: 21 TABLET | Refills: 0 | Status: SHIPPED | OUTPATIENT
Start: 2023-12-05

## 2023-12-05 RX ORDER — BUPIVACAINE HYDROCHLORIDE 2.5 MG/ML
1 INJECTION, SOLUTION EPIDURAL; INFILTRATION; INTRACAUDAL
Status: COMPLETED | OUTPATIENT
Start: 2023-12-05 | End: 2023-12-05

## 2023-12-05 RX ADMIN — BETAMETHASONE SODIUM PHOSPHATE AND BETAMETHASONE ACETATE 6 MG: 3; 3 INJECTION, SUSPENSION INTRA-ARTICULAR; INTRALESIONAL; INTRAMUSCULAR; SOFT TISSUE at 11:45

## 2023-12-05 RX ADMIN — BUPIVACAINE HYDROCHLORIDE 1 ML: 2.5 INJECTION, SOLUTION EPIDURAL; INFILTRATION; INTRACAUDAL at 11:45

## 2023-12-05 RX ADMIN — LIDOCAINE HYDROCHLORIDE 1 ML: 10 INJECTION, SOLUTION INFILTRATION; PERINEURAL at 11:45

## 2023-12-05 NOTE — PATIENT INSTRUCTIONS
PATELLOFEMORAL SYNDROME-Mauro TAPING TECHNIQUE    Search “Leukotape P tape” and “Cover roll stretch tape” on  [employer]. [facility]  Leukotape P is typically 1.5in x 15 yards, Cover roll stretch tape is typically 2in x 10 yards        How to apply:  Place cover roll tape over knee cap. This protects the skin. Apply Leukotape over the cover roll tape. Use the Leukotape to pull the knee cap to the middle of the body (medial side of knee) to prevent lateral (outside) tracking of the knee cap. Wear the tape for 3 days (72 hrs) straight, then take off one day (24 hrs) off, then repeat. Visit [employer] com and search “Mauro tape for the knee” to watch a video on how to apply tape. Video titled “Mauro Taping of the knee” created by [employer] recommended. What does Mauro taping technique do? Patellofemoral syndrome is when the inside quadriceps muscle, called the VMO muscle, becomes weak due to a number of factors. This causes the Patellofemoral ligament, which is the only ligament holding the patella (knee cap) in place, to become weak as well. When the ligament becomes weak, the knee cap drifts or tracks too far to the lateral side of the knee (outside knee) which causes tension on this ligament. The knee cap hits the lateral area of the femur, resulting in pain or discomfort around the front of the knee. Physical Therapy is sometimes used to strengthen the weak muscles, such as the VMO, to correct this problem. When the tape is applied correctly, it helps to realign the knee cap to the center of the knee. This helps correct for the lateral tracking of the knee cap and relieve discomfort. You can search online for exercises that can help strengthen the VMO quadriceps muscle or attend physical therapy.

## 2023-12-05 NOTE — PROGRESS NOTES
Assessment:  1. Patellofemoral arthritis of left knee          Patient Active Problem List   Diagnosis    Rheumatoid arthritis involving multiple sites with positive rheumatoid factor (HCC)    High risk medication use    Arthritis    Calcific tendinitis    Cervical pain    COVID-19    Low back pain with sciatica    Lumbar radiculopathy    Lumbar disc herniation    Lumbar spondylosis    Patellofemoral arthritis of left knee    Lower extremity edema    Folliculitis           Plan      Cortisone injection given today viscosupplement ordered as well as Medrol Dosepak. Elisa taping technique also added            Subjective:     Patient ID:    Chief Complaint:Alison Hercules 46 y.o. female      HPI    Patient comes in today with regards to her left knee. She has been seen about a year and a half ago for the same thing but her pain seems to be bothersome when she lies down at night and also sometimes have pain in the posterior knee where she sometimes feels like her knee is catching. She had previous injections and most recently had injection by rheumatologist back in August.      The following portions of the patient's history were reviewed and updated as appropriate: allergies, current medications, past family history, past social history, past surgical history and problem list.    All organ systems normal    Social History     Socioeconomic History    Marital status: Single     Spouse name: Not on file    Number of children: Not on file    Years of education: Not on file    Highest education level: Not on file   Occupational History    Not on file   Tobacco Use    Smoking status: Some Days     Types: E-Cigarettes    Smokeless tobacco: Never    Tobacco comments:     1 1/2 yrs vaping now juul.    Vaping Use    Vaping Use: Every day    Substances: Nicotine, Flavoring   Substance and Sexual Activity    Alcohol use: Not Currently     Comment: Social 1-5 times a month    Drug use: Never    Sexual activity: Yes Partners: Female   Other Topics Concern    Not on file   Social History Narrative    Not on file     Social Determinants of Health     Financial Resource Strain: Not on file   Food Insecurity: Not on file   Transportation Needs: Not on file   Physical Activity: Not on file   Stress: Not on file   Social Connections: Not on file   Intimate Partner Violence: Not on file   Housing Stability: Not on file     Past Medical History:   Diagnosis Date    Allergic     Seasonal    Anxiety     Depression     Knee pain     Pinched nerve     L5, L2    Rheumatoid arthritis ([address])      Past Surgical History:   Procedure Laterality Date    ROTATOR CUFF REPAIR       Allergies   Allergen Reactions    Clarithromycin GI Intolerance    Seasonal Ic [Cholestatin] Other (See Comments)     stuffiness     Current Outpatient Medications on File Prior to Visit   Medication Sig Dispense Refill    albuterol (Ventolin HFA) 90 mcg/act inhaler Inhale 2 puffs every 6 (six) hours as needed for wheezing or shortness of breath 18 g 1    Ascorbic Acid (vitamin C) 1000 MG tablet Take 1,000 mg by mouth daily      cholecalciferol (VITAMIN D3) 1,000 units tablet Take 2,000 Units by mouth      famotidine (PEPCID) 20 mg tablet Take 1 tablet (20 mg total) by mouth 2 (two) times a day 673 tablet 3    folic acid (FOLVITE) 1 mg tablet Take 1 tablet (1 mg total) by mouth daily 90 tablet 3    hydroxychloroquine (PLAQUENIL) 200 mg tablet Take 1 tablet (200 mg total) by mouth 2 (two) times a day 180 tablet 3    ibuprofen (MOTRIN) 800 mg tablet Take 1 tablet (800 mg total) by mouth 2 (two) times a day 180 tablet 3    magnesium gluconate (MAGONATE) 500 mg tablet Take 500 mg by mouth daily      methotrexate 2.5 MG tablet TAKE EIGHT TABLETS BY MOUTH ONCE A WEEK. take all 8 tablets on the same day every week 96 tablet 3    multivitamin (THERAGRAN) TABS Take 1 tablet by mouth daily      pyridoxine (B-6) 100 MG tablet Take 100 mg by mouth      Upadacitinib ER 15 MG TB24 Take 15 mg by mouth in the morning 30 tablet 11    vitamin B-12 (VITAMIN B-12) 1,000 mcg tablet Take by mouth daily      zinc gluconate 50 mg tablet Take 50 mg by mouth daily      aluminum chloride (DRYSOL) 20 % external solution Apply topically daily at bedtime (Patient not taking: Reported on 11/16/2023) 60 mL 1    furosemide (LASIX) 20 mg tablet Take 1 tablet (20 mg total) by mouth 2 (two) times a day (Patient taking differently: Take 20 mg by mouth 2 (two) times a day PRN) 60 tablet 2    ketoconazole (NIZORAL) 2 % cream apply topically daily (Patient not taking: Reported on 11/16/2023) 60 g 0    methocarbamol (Robaxin-750) 750 mg tablet Take 1 tablet (750 mg total) by mouth every 6 (six) hours as needed for muscle spasms (Patient not taking: Reported on 11/16/2023) 40 tablet 0    phentermine 15 MG capsule Take 1 capsule (15 mg total) by mouth every morning (Patient not taking: Reported on 11/16/2023) 30 capsule 0    predniSONE 5 mg tablet 4 tabs x7 days, then 3 tabs x7 days, then 2 tabs x7 days, then 1 tab x7 days, then stop. (Patient not taking: Reported on 11/16/2023) 70 tablet 0     No current facility-administered medications on file prior to visit. Objective:        Ortho Exam  No effusion no ecchymosis no tenderness along the medial lateral joint line which raise test was negative bounce test was negative but she does have pain over the lateral patella facet exquisite over here as well as patellar grind test is positive. Large joint arthrocentesis: L knee  Universal Protocol:  Consent given by: patient  Time out: Immediately prior to procedure a "time out" was called to verify the correct patient, procedure, equipment, support staff and site/side marked as required.   Supporting Documentation  Indications: pain   Procedure Details  Location: knee - L knee  Needle size: 22 G  Ultrasound guidance: no  Approach: anterolateral  Medications administered: 1 mL lidocaine 1 %; 6 mg betamethasone acetate-betamethasone sodium phosphate 6 (3-3) mg/mL; 1 mL bupivacaine (PF) 0.25 %                    Portions of the record may have been created with voice recognition software. Occasional wrong word or "sound a like" substitutions may have occurred due to the inherent limitations of voice recognition software. Read the chart carefully and recognize, using context, where substitutions have occurred.

## 2024-01-05 ENCOUNTER — PROCEDURE VISIT (OUTPATIENT)
Dept: OBGYN CLINIC | Facility: CLINIC | Age: 54
End: 2024-01-05
Payer: COMMERCIAL

## 2024-01-05 VITALS — WEIGHT: 208 LBS | BODY MASS INDEX: 38.28 KG/M2 | HEIGHT: 62 IN

## 2024-01-05 DIAGNOSIS — G89.29 CHRONIC PAIN OF LEFT KNEE: ICD-10-CM

## 2024-01-05 DIAGNOSIS — M17.12 PATELLOFEMORAL ARTHRITIS OF LEFT KNEE: Primary | ICD-10-CM

## 2024-01-05 DIAGNOSIS — M25.562 CHRONIC PAIN OF LEFT KNEE: ICD-10-CM

## 2024-01-05 DIAGNOSIS — M71.22 SYNOVIAL CYST OF LEFT POPLITEAL SPACE: Primary | ICD-10-CM

## 2024-01-05 DIAGNOSIS — M17.12 PRIMARY OSTEOARTHRITIS OF LEFT KNEE: ICD-10-CM

## 2024-01-05 PROCEDURE — 20610 DRAIN/INJ JOINT/BURSA W/O US: CPT | Performed by: ORTHOPAEDIC SURGERY

## 2024-01-05 RX ORDER — HYALURONATE SODIUM 10 MG/ML
20 SYRINGE (ML) INTRAARTICULAR
Status: COMPLETED | OUTPATIENT
Start: 2024-01-05 | End: 2024-01-05

## 2024-01-05 RX ORDER — METHYLPREDNISOLONE 4 MG/1
TABLET ORAL
Qty: 21 TABLET | Refills: 0 | Status: SHIPPED | OUTPATIENT
Start: 2024-01-05

## 2024-01-05 RX ADMIN — Medication 20 MG: at 14:00

## 2024-01-05 NOTE — PROGRESS NOTES
1. Patellofemoral arthritis of left knee        2. Primary osteoarthritis of left knee        3. Chronic pain of left knee          Patient is here for her 1st injection of Euflexxa into the left knee.     Patient rates their pain as 7/10 today    Physical exam of the knee shows no effusion no ecchymosis.      Large joint arthrocentesis: L knee  Universal Protocol:  Consent: Verbal consent obtained.  Risks and benefits: risks, benefits and alternatives were discussed  Consent given by: patient  Timeout called at: 1/5/2024 2:29 PM.  Patient understanding: patient states understanding of the procedure being performed  Patient identity confirmed: verbally with patient  Supporting Documentation  Indications: pain and diagnostic evaluation   Procedure Details  Location: knee - L knee  Needle size: 22 G  Ultrasound guidance: no  Approach: anterolateral  Medications administered: 20 mg Sodium Hyaluronate (Viscosup) 20 MG/2ML  Specialty Pharmacy Supplied: received medications from pharmacy  Patient tolerance: patient tolerated the procedure well with no immediate complications  Dressing:  Sterile dressing applied          Patient tolerated procedure follow up in one week

## 2024-01-09 ENCOUNTER — TELEPHONE (OUTPATIENT)
Dept: OBGYN CLINIC | Facility: CLINIC | Age: 54
End: 2024-01-09

## 2024-01-09 NOTE — TELEPHONE ENCOUNTER
----- Message from Mehran Dubon sent at 1/5/2024  3:36 PM EST -----  Regarding: scheduling  Dr Howard is referring patient to Dr Ha for the following     Diagnosis:  Synovial cyst of left popliteal space (M71.22 [ICD-10-CM])     Please contact for scheduling    Thank you

## 2024-01-09 NOTE — TELEPHONE ENCOUNTER
LMOMTCB TO RESCHEDULE- Patient made appointment via MyChart but is not an appropriate day or time for [facility].

## 2024-01-12 ENCOUNTER — PROCEDURE VISIT (OUTPATIENT)
Dept: OBGYN CLINIC | Facility: CLINIC | Age: 54
End: 2024-01-12
Payer: COMMERCIAL

## 2024-01-12 VITALS — WEIGHT: 208 LBS | HEIGHT: 62 IN | BODY MASS INDEX: 38.28 KG/M2

## 2024-01-12 DIAGNOSIS — M17.12 PRIMARY OSTEOARTHRITIS OF LEFT KNEE: Primary | ICD-10-CM

## 2024-01-12 PROCEDURE — 20610 DRAIN/INJ JOINT/BURSA W/O US: CPT

## 2024-01-12 RX ORDER — HYALURONATE SODIUM 10 MG/ML
20 SYRINGE (ML) INTRAARTICULAR
Status: COMPLETED | OUTPATIENT
Start: 2024-01-12 | End: 2024-01-12

## 2024-01-12 RX ADMIN — Medication 20 MG: at 13:45

## 2024-01-12 NOTE — PROGRESS NOTES
1. Primary osteoarthritis of left knee          Patient is here for her 2nd injection of Euflexxa into the left knee.     Patient rates their pain as 5/10 today    Physical exam of the knee shows no effusion no ecchymosis.      Large joint arthrocentesis: L knee  Universal Protocol:  Consent: Verbal consent obtained.  Consent given by: patient  Patient understanding: patient states understanding of the procedure being performed  Patient identity confirmed: verbally with patient  Supporting Documentation  Indications: pain   Procedure Details  Location: knee - L knee  Needle size: 22 G  Approach: anterolateral  Medications administered: 20 mg Sodium Hyaluronate (Viscosup) 20 MG/2ML  Specialty Pharmacy Supplied: received medications from pharmacy  Patient tolerance: patient tolerated the procedure well with no immediate complications  Dressing:  Sterile dressing applied          Patient tolerated procedure follow up 1 week for 3rd injection of Euflexxa into left knee.

## 2024-01-19 ENCOUNTER — PROCEDURE VISIT (OUTPATIENT)
Dept: OBGYN CLINIC | Facility: CLINIC | Age: 54
End: 2024-01-19
Payer: COMMERCIAL

## 2024-01-19 ENCOUNTER — OFFICE VISIT (OUTPATIENT)
Dept: FAMILY MEDICINE CLINIC | Facility: CLINIC | Age: 54
End: 2024-01-19
Payer: COMMERCIAL

## 2024-01-19 VITALS — BODY MASS INDEX: 38.28 KG/M2 | WEIGHT: 208 LBS | HEIGHT: 62 IN

## 2024-01-19 VITALS
HEART RATE: 90 BPM | SYSTOLIC BLOOD PRESSURE: 120 MMHG | RESPIRATION RATE: 17 BRPM | HEIGHT: 62 IN | TEMPERATURE: 98.6 F | OXYGEN SATURATION: 97 % | DIASTOLIC BLOOD PRESSURE: 82 MMHG | BODY MASS INDEX: 40.12 KG/M2 | WEIGHT: 218 LBS

## 2024-01-19 DIAGNOSIS — B35.1 ONYCHOMYCOSIS OF TOENAIL: Primary | ICD-10-CM

## 2024-01-19 DIAGNOSIS — M17.12 PATELLOFEMORAL ARTHRITIS OF LEFT KNEE: Primary | ICD-10-CM

## 2024-01-19 DIAGNOSIS — M05.79 RHEUMATOID ARTHRITIS INVOLVING MULTIPLE SITES WITH POSITIVE RHEUMATOID FACTOR (HCC): ICD-10-CM

## 2024-01-19 PROCEDURE — 20610 DRAIN/INJ JOINT/BURSA W/O US: CPT | Performed by: ORTHOPAEDIC SURGERY

## 2024-01-19 PROCEDURE — 99213 OFFICE O/P EST LOW 20 MIN: CPT | Performed by: NURSE PRACTITIONER

## 2024-01-19 RX ORDER — HYALURONATE SODIUM 10 MG/ML
20 SYRINGE (ML) INTRAARTICULAR
Status: COMPLETED | OUTPATIENT
Start: 2024-01-19 | End: 2024-01-19

## 2024-01-19 RX ADMIN — Medication 20 MG: at 14:00

## 2024-01-19 NOTE — PROGRESS NOTES
"1. Patellofemoral arthritis of left knee          Patient is here for her third injection of Euflexxa into the left knee.     Patient rates their pain as 3/10 today    Physical exam of the knee shows no effusion no ecchymosis.      Large joint arthrocentesis: L knee  Universal Protocol:  Consent given by: patient  Time out: Immediately prior to procedure a \"time out\" was called to verify the correct patient, procedure, equipment, support staff and site/side marked as required.  Supporting Documentation  Indications: pain   Procedure Details  Location: knee - L knee  Needle size: 22 G  Ultrasound guidance: no  Approach: anterolateral  Medications administered: 20 mg Sodium Hyaluronate (Viscosup) 20 MG/2ML    Patient tolerance: patient tolerated the procedure well with no immediate complications          Patient tolerated procedure follow up as needed  "

## 2024-01-19 NOTE — PROGRESS NOTES
FAMILY PRACTICE OFFICE VISIT       NAME: Alison Hercules  AGE: 54 y.o. SEX: female       : 1970        MRN: [id number]    DATE: 2024  TIME: 4:03 PM    Assessment and Plan   1. Onychomycosis of toenail  -     Ambulatory Referral to Podiatry; Future    2. Rheumatoid arthritis involving multiple sites with positive rheumatoid factor (HCC)                 Chief Complaint     Chief Complaint   Patient presents with    Nail Problem     Pt c/o Dead toe nail       History of Present Illness   Alison Hercules is a 54 y.o.-year-old female who is here for c/o left 4th tow nail discoloration  Bump on lip resolved  Nail discoloration for months  White nail  Use to have nail polish on at one time  No pain  No dark spots  No thick nail  No other nails affected      Review of Systems   Review of Systems   Constitutional:  Negative for fatigue and fever.   HENT:  Negative for congestion, postnasal drip and rhinorrhea.    Respiratory:  Negative for cough, shortness of breath and wheezing.    Cardiovascular:  Negative for chest pain and palpitations.   Gastrointestinal:  Negative for constipation, diarrhea, nausea and vomiting.   Genitourinary:  Negative for dysuria and frequency.   Musculoskeletal:  Negative for arthralgias and myalgias.   Skin:  Negative for rash.   Neurological:  Negative for dizziness, light-headedness and headaches.   Hematological:  Negative for adenopathy.   Psychiatric/Behavioral:  Negative for dysphoric mood and sleep disturbance. The patient is not nervous/anxious.        Active Problem List     Patient Active Problem List   Diagnosis    Rheumatoid arthritis involving multiple sites with positive rheumatoid factor (HCC)    High risk medication use    Arthritis    Calcific tendinitis    Cervical pain    COVID-19    Low back pain with sciatica    Lumbar radiculopathy    Lumbar disc herniation    Lumbar spondylosis    Patellofemoral arthritis of left knee    Lower extremity edema    Folliculitis    Primary  osteoarthritis of left knee    Onychomycosis of toenail         Past Medical History:  Past Medical History:   Diagnosis Date    Allergic     Seasonal    Anxiety     Depression     Knee pain     Pinched nerve     L5, L2    Rheumatoid arthritis (HCC)        Past Surgical History:  Past Surgical History:   Procedure Laterality Date    ROTATOR CUFF REPAIR         Family History:  Family History   Problem Relation Age of Onset    Arthritis Mother     COPD Father        Social History:  Social History     Socioeconomic History    Marital status: Single     Spouse name: Not on file    Number of children: Not on file    Years of education: Not on file    Highest education level: Not on file   Occupational History    Not on file   Tobacco Use    Smoking status: Former     Current packs/day: 1.00     Average packs/day: 1 pack/day for 35.0 years (35.0 ttl pk-yrs)     Types: Cigarettes    Smokeless tobacco: Never    Tobacco comments:     1 1/2 yrs vaping now juul.   Vaping Use    Vaping status: Every Day    Substances: Nicotine, Flavoring   Substance and Sexual Activity    Alcohol use: Yes     Comment: Social 1-5 times a month    Drug use: Never    Sexual activity: Yes     Partners: Female   Other Topics Concern    Not on file   Social History Narrative    Not on file     Social Determinants of Health     Financial Resource Strain: Not on file   Food Insecurity: Not on file   Transportation Needs: Not on file   Physical Activity: Not on file   Stress: Not on file   Social Connections: Not on file   Intimate Partner Violence: Not on file   Housing Stability: Not on file       Objective     Vitals:    01/19/24 1452   BP: 120/82   Pulse: 90   Resp: 17   Temp: 98.6 °F (37 °C)   SpO2: 97%     Wt Readings from Last 3 Encounters:   01/19/24 98.9 kg (218 lb)   01/19/24 94.3 kg (208 lb)   01/12/24 94.3 kg (208 lb)       Physical Exam  Vitals and nursing note reviewed.   Constitutional:       Appearance: Normal appearance.   HENT:       "Head: Normocephalic and atraumatic.   Eyes:      Conjunctiva/sclera: Conjunctivae normal.   Cardiovascular:      Rate and Rhythm: Normal rate and regular rhythm.      Heart sounds: Normal heart sounds.   Pulmonary:      Effort: Pulmonary effort is normal.      Breath sounds: Normal breath sounds.   Musculoskeletal:         General: Normal range of motion.      Cervical back: Normal range of motion and neck supple.   Skin:     General: Skin is warm and dry.      Capillary Refill: Capillary refill takes less than 2 seconds.      Comments: Left 4th toe nail white nail      Neurological:      General: No focal deficit present.      Mental Status: She is alert and oriented to person, place, and time.   Psychiatric:         Mood and Affect: Mood normal.         Behavior: Behavior normal.         Pertinent Laboratory/Diagnostic Studies:  Lab Results   Component Value Date    BUN 18 11/16/2023    CREATININE 0.70 11/16/2023    CALCIUM 9.7 11/16/2023    K 4.9 11/16/2023    CO2 26 11/16/2023     11/16/2023     Lab Results   Component Value Date    ALT 15 11/16/2023    AST 15 11/16/2023    ALKPHOS 63 11/16/2023       Lab Results   Component Value Date    WBC 7.60 11/16/2023    HGB 12.9 11/16/2023    HCT 39.7 11/16/2023    MCV 92 11/16/2023     (H) 11/16/2023       No results found for: \"TSH\"    No results found for: \"CHOL\"  Lab Results   Component Value Date    TRIG 139 07/08/2022     Lab Results   Component Value Date    HDL 68 07/08/2022     Lab Results   Component Value Date    LDLCALC 140 (H) 07/08/2022     No results found for: \"HGBA1C\"    Results for orders placed or performed in visit on 09/13/23   COVID Only- Office Collect    Specimen: Nose; Nares   Result Value Ref Range    SARS-CoV-2 Positive (A) Negative       Orders Placed This Encounter   Procedures    Ambulatory Referral to Podiatry       ALLERGIES:  Allergies   Allergen Reactions    Clarithromycin GI Intolerance    Seasonal Ic [Cholestatin] Other " (See Comments)     stuffiness       Current Medications     Current Outpatient Medications   Medication Sig Dispense Refill    Ascorbic Acid (vitamin C) 1000 MG tablet Take 1,000 mg by mouth daily      cholecalciferol (VITAMIN D3) 1,000 units tablet Take 2,000 Units by mouth      famotidine (PEPCID) 20 mg tablet Take 1 tablet (20 mg total) by mouth 2 (two) times a day 180 tablet 3    folic acid (FOLVITE) 1 mg tablet Take 1 tablet (1 mg total) by mouth daily 90 tablet 3    hydroxychloroquine (PLAQUENIL) 200 mg tablet Take 1 tablet (200 mg total) by mouth 2 (two) times a day 180 tablet 3    ibuprofen (MOTRIN) 800 mg tablet Take 1 tablet (800 mg total) by mouth 2 (two) times a day 180 tablet 3    magnesium gluconate (MAGONATE) 500 mg tablet Take 500 mg by mouth daily      methotrexate 2.5 MG tablet TAKE EIGHT TABLETS BY MOUTH ONCE A WEEK. take all 8 tablets on the same day every week 96 tablet 3    multivitamin (THERAGRAN) TABS Take 1 tablet by mouth daily      pyridoxine (B-6) 100 MG tablet Take 100 mg by mouth      Upadacitinib ER 15 MG TB24 Take 15 mg by mouth in the morning 30 tablet 11    vitamin B-12 (VITAMIN B-12) 1,000 mcg tablet Take by mouth daily      zinc gluconate 50 mg tablet Take 50 mg by mouth daily      albuterol (Ventolin HFA) 90 mcg/act inhaler Inhale 2 puffs every 6 (six) hours as needed for wheezing or shortness of breath (Patient not taking: Reported on 1/19/2024) 18 g 1    aluminum chloride (DRYSOL) 20 % external solution Apply topically daily at bedtime (Patient not taking: Reported on 11/16/2023) 60 mL 1    furosemide (LASIX) 20 mg tablet Take 1 tablet (20 mg total) by mouth 2 (two) times a day (Patient taking differently: Take 20 mg by mouth 2 (two) times a day PRN) 60 tablet 2    ketoconazole (NIZORAL) 2 % cream apply topically daily (Patient not taking: Reported on 1/19/2024) 60 g 0    methocarbamol (Robaxin-750) 750 mg tablet Take 1 tablet (750 mg total) by mouth every 6 (six) hours as  needed for muscle spasms (Patient not taking: Reported on 11/16/2023) 40 tablet 0    methylPREDNISolone 4 MG tablet therapy pack Use as directed on package (Patient not taking: Reported on 1/19/2024) 21 tablet 0    methylPREDNISolone 4 MG tablet therapy pack Use as directed on package (Patient not taking: Reported on 1/19/2024) 21 tablet 0    phentermine 15 MG capsule Take 1 capsule (15 mg total) by mouth every morning (Patient not taking: Reported on 11/16/2023) 30 capsule 0    predniSONE 5 mg tablet 4 tabs x7 days, then 3 tabs x7 days, then 2 tabs x7 days, then 1 tab x7 days, then stop. (Patient not taking: Reported on 11/16/2023) 70 tablet 0     No current facility-administered medications for this visit.         Health Maintenance     Health Maintenance   Topic Date Due    Pneumococcal Vaccine: Pediatrics (0 to 5 Years) and At-Risk Patients (6 to 64 Years) (1 - PCV) Never done    HIV Screening  Never done    Zoster Vaccine (1 of 2) Never done    DTaP,Tdap,and Td Vaccines (1 - Tdap) Never done    Cervical Cancer Screening  Never done    Breast Cancer Screening: Mammogram  Never done    Colorectal Cancer Screening  Never done    COVID-19 Vaccine (2 - Moderna risk series) 03/16/2022    Annual Physical  01/28/2023    Influenza Vaccine (1) Never done    Depression Screening  01/19/2025    Hepatitis C Screening  Completed    HIB Vaccine  Aged Out    IPV Vaccine  Aged Out    Hepatitis A Vaccine  Aged Out    Meningococcal ACWY Vaccine  Aged Out    HPV Vaccine  Aged Out     Immunization History   Administered Date(s) Administered    COVID-19 MODERNA VACC 0.5 ML IM 02/16/2022       Depression Screening and Follow-up Plan: Patient was screened for depression during today's encounter. They screened negative with a PHQ-2 score of 0.        ELVIRA Farmer

## 2024-02-02 ENCOUNTER — OFFICE VISIT (OUTPATIENT)
Dept: OBGYN CLINIC | Facility: CLINIC | Age: 54
End: 2024-02-02
Payer: COMMERCIAL

## 2024-02-02 VITALS — BODY MASS INDEX: 40.12 KG/M2 | HEIGHT: 62 IN | WEIGHT: 218 LBS

## 2024-02-02 DIAGNOSIS — M71.22 SYNOVIAL CYST OF LEFT POPLITEAL SPACE: ICD-10-CM

## 2024-02-02 PROCEDURE — 99213 OFFICE O/P EST LOW 20 MIN: CPT | Performed by: PHYSICAL MEDICINE & REHABILITATION

## 2024-02-02 NOTE — PROGRESS NOTES
1. Synovial cyst of left popliteal space  Ambulatory Referral to Sports Medicine        No orders of the defined types were placed in this encounter.       Impression:  This is a patient referred by Dr. Howard's team with left knee pain secondary to moderate-severe osteoarthritis possibly leading to Baker's cyst.  The patient is a good candidate for ultrasound-guided evaluation of the posterior knee.  Upon evaluation, there was no cystic structure consistent with a Baker's cyst.  The patient recently hide oral steroids and hyaluronic acid injection series with some relief.  She will continue with her home exercise program.  I will see her back if needed.    Imaging Studies (I personally reviewed images in PACS and report):  Left knee x-rays most recent to this encounter reviewed.  These images show moderate-severe osteoarthritis that predominantly affects the medial joint space.    The patient's pertinent emergency department/urgent care/referring physician's notes were reviewed.    No follow-ups on file.    Patient is in agreement with the above plan.    HPI:  Alison Hercules is a 54 y.o. female  who presents for evaluation of   Chief Complaint   Patient presents with    Left Knee - Pain, Swelling       History of present illness from referring clinician's notes reviewed.  Patient has a steroid injection and recently completed hyaluronic acid injections.  She is here for cyst to be drained.    Following history reviewed and updated:  Past Medical History:   Diagnosis Date    Allergic     Seasonal    Anxiety     Depression     Knee pain     Pinched nerve     L5, L2    Rheumatoid arthritis (HCC)      Past Surgical History:   Procedure Laterality Date    ROTATOR CUFF REPAIR       Social History   Social History     Substance and Sexual Activity   Alcohol Use Yes    Comment: Social 1-5 times a month     Social History     Substance and Sexual Activity   Drug Use Never     Social History     Tobacco Use   Smoking Status  "Former    Current packs/day: 1.00    Average packs/day: 1 pack/day for 35.0 years (35.0 ttl pk-yrs)    Types: Cigarettes   Smokeless Tobacco Never   Tobacco Comments    1 1/2 yrs vaping now juul.     Family History   Problem Relation Age of Onset    Arthritis Mother     COPD Father      Allergies   Allergen Reactions    Clarithromycin GI Intolerance    Seasonal Ic [Cholestatin] Other (See Comments)     stuffiness        Constitutional:  Ht 5' 2\" (1.575 m)   Wt 98.9 kg (218 lb)   BMI 39.87 kg/m²    General: NAD.  Eyes: Anicteric sclerae.  Neck: Supple.  Lungs: Unlabored breathing.  Cardiovascular: No lower extremity edema.  Skin: Intact without erythema.  Neurologic: Sensation intact to light touch.  Psychiatric: Mood and affect are appropriate.    Left Knee Exam     Tenderness   The patient is experiencing tenderness in the medial joint line.    Range of Motion   Extension:  normal     Tests   Varus: negative Valgus: negative    Other   Erythema: absent  Scars: absent  Sensation: normal  Pulse: present  Swelling: none  Effusion: no effusion present             Procedures                "

## 2024-02-15 ENCOUNTER — NURSE TRIAGE (OUTPATIENT)
Age: 54
End: 2024-02-15

## 2024-02-15 NOTE — TELEPHONE ENCOUNTER
"Patient call:  Patient reporting flare up with symptoms of:  Stiffness, dull/aching/sharp pain in the elbow, hands, hips, back, and knees which is worsened with movement and laying down. States that the symptoms have progressively gotten worse over the past 2 weeks.  Taking ibuprofen with minimal symptom relief      Dispo: Requesting steroids or other medication. Continue home regime and minimize aggravating factors. Await Dr. Lagunas response for further medication management.  Agrees with plan.   All questions answered. No further needs at the moment.     Reason for Disposition   Hip pain    Answer Assessment - Initial Assessment Questions  1. LOCATION and RADIATION: \"Where is the pain located?\"       Hips, hands, elbows, back, and knees  2. QUALITY: \"What does the pain feel like?\"  (e.g., sharp, dull, aching, burning)      Sharp, stiff, then dull ache which persists  3. SEVERITY: \"How bad is the pain?\" \"What does it keep you from doing?\"         8/10  4. ONSET: \"When did the pain start?\" \"Does it come and go, or is it there all the time?\"      2 weeks ago  5. WORK OR EXERCISE: \"Has there been any recent work or exercise that involved this part of the body?\"       no  6. CAUSE: \"What do you think is causing the hip pain?\"       Flare up    8. OTHER SYMPTOMS: \"Do you have any other symptoms?\" (e.g., back pain, pain shooting down leg,  fever, rash)      no    Protocols used: Hip Pain-ADULT-[place]    "

## 2024-02-18 DIAGNOSIS — M05.79 RHEUMATOID ARTHRITIS INVOLVING MULTIPLE SITES WITH POSITIVE RHEUMATOID FACTOR (HCC): Primary | ICD-10-CM

## 2024-02-18 RX ORDER — METHYLPREDNISOLONE 4 MG/1
TABLET ORAL
Qty: 50 TABLET | Refills: 0 | Status: SHIPPED | OUTPATIENT
Start: 2024-02-18

## 2024-03-20 ENCOUNTER — TELEPHONE (OUTPATIENT)
Age: 54
End: 2024-03-20

## 2024-03-20 NOTE — TELEPHONE ENCOUNTER
PA for Rinvoq    Submitted via    [x]CMM-KEY [id number]   [][employer]-Case ID #    []Faxed to plan   []Other website    []Phone call Case ID #      Office notes sent, clinical questions answered. Awaiting determination    Turnaround time for your insurance to make a decision on your Prior Authorization can take 7-21 business days.

## 2024-04-04 ENCOUNTER — OFFICE VISIT (OUTPATIENT)
Dept: RHEUMATOLOGY | Facility: CLINIC | Age: 54
End: 2024-04-04
Payer: COMMERCIAL

## 2024-04-04 VITALS — HEIGHT: 62 IN | BODY MASS INDEX: 39.87 KG/M2 | SYSTOLIC BLOOD PRESSURE: 124 MMHG | DIASTOLIC BLOOD PRESSURE: 70 MMHG

## 2024-04-04 DIAGNOSIS — Z79.631 METHOTREXATE, LONG TERM, CURRENT USE: ICD-10-CM

## 2024-04-04 DIAGNOSIS — Z79.899 HIGH RISK MEDICATION USE: ICD-10-CM

## 2024-04-04 DIAGNOSIS — M05.79 RHEUMATOID ARTHRITIS INVOLVING MULTIPLE SITES WITH POSITIVE RHEUMATOID FACTOR (HCC): Primary | ICD-10-CM

## 2024-04-04 DIAGNOSIS — M17.12 OSTEOARTHRITIS OF LEFT KNEE, UNSPECIFIED OSTEOARTHRITIS TYPE: ICD-10-CM

## 2024-04-04 PROCEDURE — 99215 OFFICE O/P EST HI 40 MIN: CPT | Performed by: INTERNAL MEDICINE

## 2024-04-04 RX ORDER — TURMERIC 95 %
POWDER (GRAM) MISCELLANEOUS
COMMUNITY

## 2024-04-04 RX ORDER — CICLOPIROX 80 MG/ML
SOLUTION TOPICAL
COMMUNITY
Start: 2024-01-31 | End: 2024-04-04

## 2024-04-04 NOTE — LETTER
April 4, 2024     Patient: Alison Hercules  YOB: 1970  Date of Visit: 4/4/2024      To Whom it May Concern:    Alison Hercules is under my professional care. Alison was seen in my office on 4/4/2024.     If you have any questions or concerns, please don't hesitate to call.         Sincerely,          Dipti Lagnuas MD

## 2024-04-04 NOTE — PROGRESS NOTES
Assessment and Plan:  Alison Hercules is a 54 y.o.  female who presents for follow-up of her seropostive rheumatoid arthritis, which has become uncontrolled again and is progressing.  She was initially doing well on Rinvoq, but it has been losing efficacy, and it caused her to develop acne.  She admits to 30 minutes of morning stiffness.  She may be a good candidate for Xeljanz 11 mg daily instead.  Will work on prior authorization. Patient's rheumatologic disease(s) threaten long-term function if not appropriately treated. Left knee OA is still an issue; seeing Orthopedics.    Continue ibuprofen 800mg 3 times a day as needed for joint pain, take with food  Continue famotidine twice a day  Continue folic acid daily  Continue methotrexate 8 tabs once a week  Continue Vit. D daily  Continue hydroxychloroquine twice a day; continue regular eye exams  Will work on Xeljanz 11mg daily pill prior auth  Do labs now, then again before next visit     Return to clinic in 4 months    Plan:  1. Rheumatoid arthritis involving multiple sites with positive rheumatoid factor (HCC)  -     CBC and differential; Standing  -     Comprehensive metabolic panel; Standing  -     C-reactive protein; Standing  -     Sedimentation rate, automated; Standing    2. Osteoarthritis of left knee, unspecified osteoarthritis type    3. Methotrexate, long term, current use    4. High risk medication use  -     Chronic Hepatitis Panel  -     Quantiferon TB Gold Plus Assay  -     CBC and differential; Standing  -     Comprehensive metabolic panel; Standing     High risk medication use - Benefits and risks of hydroxychloroquine, including but not limited to retinal toxicity, corneal deposits, gastrointestinal side effects, and headaches were previously discussed with the patient. She is aware of the need for a regular eye exam to monitor for ocular toxicity while on this medication.     Benefits and risks of methotrexate use, including but not limited to  gastrointestinal disturbances such as diarrhea, hair loss, fatigue, stomatitis, leukopenia, lung hypersensitivity reaction, hepatotoxicity, and lymphoma were previously discussed with the patient. Baseline viral hepatitis panel was ordered and returned negative.  CBC,CMP will be regularly monitored. Patient does not plan on having any children. Patient was prescribed Folic Acid 1 mg po daily to take while on methotrexate to help prevent side effects. Patient counseled on abstinence from alcohol while using methotrexate.      Benefits and risks of MISSAEL inhibitor biologic agents like Xeljanz were discussed, and include but are not limited to reactivation of hepatitis B/C or TB, diverticular perforation, hyperlipidemia, hepatotoxicity, VTE, and increased risk of infections. A baseline viral hepatitis panel and TB test was checked. CBC/CMP and lipid panel will be monitored regularly.    RTC in 4 months      Chief Complaint  Follow-up visit of her rheumatoid arthritis    Rheumatic Disease Summary  Last visit 11/16/23: Patient presents for follow-up of seropositive rheumatoid arthritis, which has come under much better control since starting Rinvoq few months ago.  She admits to half an hour to 40 minutes of morning stiffness.  Her left knee will swell up at times, has OA in it; steroid injections and hyaluronic acid injections haven't helped; next step is surgery..  Continue ibuprofen 800mg 2 times a day as needed for joint pain  Continue famotidine twice a day  Continue folic acid daily  Continue methotrexate 8 tabs once a week  Continue Vit. D daily  Continue hydroxychloroquine twice a day; continue regular eye exams  Continue daily Rinvoq 15mg po   Do labs before next visit     AC Hercules is a 54-year-old female who presents here for follow-up visit of her rheumatoid arthritis.    The following portions of the patient's history were reviewed and updated as appropriate: allergies, current medications, past family  history, past medical history, past social history, past surgical history and problem list.    Interval History  She reports that Rinvoq was initially effective, but ceased its efficacy approximately one month prior to 02/15/2024, coinciding with the cessation of the medication. She experienced severe acne on her scalp, face, and body, which subsided within 2.5 weeks following the cessation of Rinvoq. Despite completing a prednisone taper and receiving three gel injections in her knee, she continues to experience stiffness and discomfort in her hands. Her primary concern is her knee, which she perceives as bone-on-bone. Despite multiple unsuccessful attempts, her insurance has declined surgery, necessitating a temporary cessation of all medications. She experiences morning stiffness and severe back pain, which subsides after approximately 30 minutes of medication administration. Prednisone reduces knee swelling, but she experiences no pain without swelling. She is on her feet for 8 hours a day. During a flare-up, she experienced pain in her elbows, hands, hips, back, and knees. Currently, her elbows and hands do not cause discomfort, but she experiences mild discomfort in her thigh, which appears slightly puffy. Her back pain is constant. An ultrasound of her Baker's cyst did not reveal any abnormalities. Her knee swelling occasionally impedes her ability to walk, affecting her hips and back, and her gait. She denies any lung issues, shortness of breath, or breathing problems. She has tried and failed or had adverse effects to hydroxychloroquine, methotrexate, Humira, Orencia, and lately, Actemra and Rinvoq which were short-lived. She reports that Rinvoq has made her most improved. She works in [place] and has never been exposed to tuberculosis. She has gained weight since starting Rinvoq, which she attributes to decreased physical activity. Her morning stiffness is manageable for about 30 minutes after medication  intake, but without medication, it lasts for a couple of hours. She has difficulty lifting her dog's . Her current medications include ibuprofen 800 mg thrice daily with food, famotidine twice a day, folic acid daily, methotrexate 8 tablets once a week, hydroxychloroquine twice a day, daily vitamin D, milk thistle, curcumin, and CuraMed 750 mg, which she has been taking for about 1.5 months. Her stiffness becomes uncontrollable in rainy weather. Her [facility] paperwork needs to be updated.    Review of Systems:   Pertinent ROS positive for joint pain, joint swelling, and back pain. The rest of the 14-point ROS reviewed and were negative.    Home Medications:    Current Outpatient Medications:   •  Ascorbic Acid (vitamin C) 1000 MG tablet, Take 1,000 mg by mouth daily, Disp: , Rfl:   •  cholecalciferol (VITAMIN D3) 1,000 units tablet, Take 2,000 Units by mouth, Disp: , Rfl:   •  famotidine (PEPCID) 20 mg tablet, Take 1 tablet (20 mg total) by mouth 2 (two) times a day, Disp: 180 tablet, Rfl: 3  •  folic acid (FOLVITE) 1 mg tablet, Take 1 tablet (1 mg total) by mouth daily, Disp: 90 tablet, Rfl: 3  •  hydroxychloroquine (PLAQUENIL) 200 mg tablet, Take 1 tablet (200 mg total) by mouth 2 (two) times a day, Disp: 180 tablet, Rfl: 3  •  ibuprofen (MOTRIN) 800 mg tablet, Take 1 tablet (800 mg total) by mouth 2 (two) times a day, Disp: 180 tablet, Rfl: 3  •  magnesium gluconate (MAGONATE) 500 mg tablet, Take 500 mg by mouth daily, Disp: , Rfl:   •  methotrexate 2.5 MG tablet, TAKE EIGHT TABLETS BY MOUTH ONCE A WEEK. take all 8 tablets on the same day every week, Disp: 96 tablet, Rfl: 3  •  Milk Thistle 250 MG CAPS, Take by mouth, Disp: , Rfl:   •  multivitamin (THERAGRAN) TABS, Take 1 tablet by mouth daily, Disp: , Rfl:   •  Turmeric, Curcuma Longa, (Curcumin Extract) POWD, Use Take 700 mg -  Herbal supplement, Disp: , Rfl:   •  vitamin B-12 (VITAMIN B-12) 1,000 mcg tablet, Take by mouth daily, Disp: , Rfl:   •  zinc  "gluconate 50 mg tablet, Take 50 mg by mouth daily, Disp: , Rfl:   •  albuterol (Ventolin HFA) 90 mcg/act inhaler, Inhale 2 puffs every 6 (six) hours as needed for wheezing or shortness of breath (Patient not taking: Reported on 1/19/2024), Disp: 18 g, Rfl: 1  •  aluminum chloride (DRYSOL) 20 % external solution, Apply topically daily at bedtime (Patient not taking: Reported on 11/16/2023), Disp: 60 mL, Rfl: 1  •  furosemide (LASIX) 20 mg tablet, Take 1 tablet (20 mg total) by mouth 2 (two) times a day (Patient taking differently: Take 20 mg by mouth 2 (two) times a day PRN), Disp: 60 tablet, Rfl: 2  •  ketoconazole (NIZORAL) 2 % cream, apply topically daily (Patient not taking: Reported on 1/19/2024), Disp: 60 g, Rfl: 0  •  methocarbamol (Robaxin-750) 750 mg tablet, Take 1 tablet (750 mg total) by mouth every 6 (six) hours as needed for muscle spasms (Patient not taking: Reported on 11/16/2023), Disp: 40 tablet, Rfl: 0  •  methylprednisolone (MEDROL) 4 mg tablet, 4 tabs once daily x5 days, then 3 tabs once daily x5 days, then 2 tabs once daily x5 days, then 1 tab once daily x5 days (Patient not taking: Reported on 4/4/2024), Disp: 50 tablet, Rfl: 0  •  phentermine 15 MG capsule, Take 1 capsule (15 mg total) by mouth every morning (Patient not taking: Reported on 11/16/2023), Disp: 30 capsule, Rfl: 0  •  pyridoxine (B-6) 100 MG tablet, Take 100 mg by mouth (Patient not taking: Reported on 4/4/2024), Disp: , Rfl:     Objective:    Vitals:    04/04/24 0944   BP: 124/70   Height: 5' 2\" (1.575 m)       Physical Exam:  Constitutional:    General: She is not in acute distress.  HENT:   Head: Normocephalic and atraumatic.   Eyes:   Conjunctiva/sclera: Conjunctivae normal.   Cardiovascular:   Rate and Rhythm: Normal rate and regular rhythm.   Heart sounds: S1 normal and S2 normal.   No friction rubs.   Pulmonary:   Effort: Pulmonary effort is normal. No respiratory distress.   Breath sounds: Normal breath sounds. No " wheezing, rhonchi, or rales.   Musculoskeletal:   Cervical back: Neck supple.   Lower extremity: There is swelling and tenderness in her left knee  Skin:  Coloration: Skin is not pale.   Neurological:   Mental Status: She is alert. Mental status is at baseline.   Psychiatric:      Mood and Affect: Mood normal.      Behavior: Behavior normal.      I have personally reviewed results with the patient.    Imaging:   Left knee x-rays 12/10/21  Moderate medial compartmental osteoarthritis as evidenced by joint space narrowing, osteophyte formation and subchondral sclerosis.     Labs:   Office Visit on 04/04/2024   Component Date Value Ref Range Status   • Hepatitis B Surface Ag 04/12/2024 Non-reactive  Non-Reactive Final   • Hepatitis C Ab 04/12/2024 Non-reactive  Non-Reactive Final   • Hep B C IgM 04/12/2024 Non-reactive  Non-Reactive Final   • Hep B Core Total Ab 04/12/2024 Non-reactive  Non-Reactive Final   • QFT Nil 04/12/2024 0.04  0 - 8.0 IU/ml Final   • QFT TB1-NIL 04/12/2024 0.02  IU/ml Final   • QFT TB2-NIL 04/12/2024 0.02  IU/ml Final   • QFT Mitogen-NIL 04/12/2024 9.46  IU/ml Final   • QFT Final Interpretation 04/12/2024 Negative  Negative Final    No Interferon-gamma response to M. tuberculosis antigens detected.  Infection with M. tuberculosis is unlikely.  A single negative result does not exclude infection with M. tuberculosis. In patients at high risk for M. tuberculosis infection, a second test should be considered in accordance with the 2017 ATS/IDSA/CDC Clinical Practice Guidelines for Diagnosis of Tuberculosis in Adults and Children. False negative results can be a result of incorrect blood sample collection or handling of the specimen affecting lymphocyte function.   Office Visit on 11/16/2023   Component Date Value Ref Range Status   • WBC 04/12/2024 5.94  4.31 - 10.16 Thousand/uL Final   • RBC 04/12/2024 4.12  3.81 - 5.12 Million/uL Final   • Hemoglobin 04/12/2024 12.2  11.5 - 15.4 g/dL Final   •  Hematocrit 04/12/2024 38.4  34.8 - 46.1 % Final   • MCV 04/12/2024 93  82 - 98 fL Final   • MCH 04/12/2024 29.6  26.8 - 34.3 pg Final   • MCHC 04/12/2024 31.8  31.4 - 37.4 g/dL Final   • RDW 04/12/2024 13.5  11.6 - 15.1 % Final   • MPV 04/12/2024 9.5  8.9 - 12.7 fL Final   • Platelets 04/12/2024 382  149 - 390 Thousands/uL Final   • nRBC 04/12/2024 0  /100 WBCs Final   • Segmented % 04/12/2024 64  43 - 75 % Final   • Immature Grans % 04/12/2024 0  0 - 2 % Final   • Lymphocytes % 04/12/2024 21  14 - 44 % Final   • Monocytes % 04/12/2024 11  4 - 12 % Final   • Eosinophils Relative 04/12/2024 3  0 - 6 % Final   • Basophils Relative 04/12/2024 1  0 - 1 % Final   • Absolute Neutrophils 04/12/2024 3.82  1.85 - 7.62 Thousands/µL Final   • Absolute Immature Grans 04/12/2024 0.02  0.00 - 0.20 Thousand/uL Final   • Absolute Lymphocytes 04/12/2024 1.22  0.60 - 4.47 Thousands/µL Final   • Absolute Monocytes 04/12/2024 0.63  0.17 - 1.22 Thousand/µL Final   • Eosinophils Absolute 04/12/2024 0.19  0.00 - 0.61 Thousand/µL Final   • Basophils Absolute 04/12/2024 0.06  0.00 - 0.10 Thousands/µL Final   • Sodium 04/12/2024 139  135 - 147 mmol/L Final   • Potassium 04/12/2024 4.3  3.5 - 5.3 mmol/L Final   • Chloride 04/12/2024 108  96 - 108 mmol/L Final   • CO2 04/12/2024 26  21 - 32 mmol/L Final   • ANION GAP 04/12/2024 5  4 - 13 mmol/L Final   • BUN 04/12/2024 23  5 - 25 mg/dL Final   • Creatinine 04/12/2024 0.68  0.60 - 1.30 mg/dL Final    Standardized to IDMS reference method   • Glucose, Fasting 04/12/2024 94  65 - 99 mg/dL Final   • Calcium 04/12/2024 9.3  8.4 - 10.2 mg/dL Final   • AST 04/12/2024 15  13 - 39 U/L Final   • ALT 04/12/2024 18  7 - 52 U/L Final    Specimen collection should occur prior to Sulfasalazine administration due to the potential for falsely depressed results.    • Alkaline Phosphatase 04/12/2024 56  34 - 104 U/L Final   • Total Protein 04/12/2024 7.1  6.4 - 8.4 g/dL Final   • Albumin 04/12/2024 4.1  3.5 -  5.0 g/dL Final   • Total Bilirubin 04/12/2024 0.27  0.20 - 1.00 mg/dL Final    Use of this assay is not recommended for patients undergoing treatment with eltrombopag due to the potential for falsely elevated results.  N-acetyl-p-benzoquinone imine (metabolite of Acetaminophen) will generate erroneously low results in samples for patients that have taken an overdose of Acetaminophen.   • eGFR 04/12/2024 98  ml/min/1.73sq m Final   • CRP 04/12/2024 5.9 (H)  <3.0 mg/L Final   • Sed Rate 04/12/2024 20  0 - 29 mm/hour Final   • Cholesterol 04/12/2024 194  See Comment mg/dL Final    Cholesterol:         Pediatric <18 Years        Desirable          <170 mg/dL      Borderline High    170-199 mg/dL      High               >=200 mg/dL        Adult >=18 Years            Desirable         <200 mg/dL      Borderline High   200-239 mg/dL      High              >239 mg/dL     • Triglycerides 04/12/2024 160 (H)  See Comment mg/dL Final    Triglyceride:     0-9Y            <75mg/dL     10Y-17Y         <90 mg/dL       >=18Y     Normal          <150 mg/dL     Borderline High 150-199 mg/dL     High            200-499 mg/dL        Very High       >499 mg/dL    Specimen collection should occur prior to Metamizole administration due to the potential for falsely depressed results.   • HDL, Direct 04/12/2024 50  >=50 mg/dL Final   • LDL Calculated 04/12/2024 112 (H)  0 - 100 mg/dL Final    LDL Cholesterol:     Optimal           <100 mg/dl     Near Optimal      100-129 mg/dl     Above Optimal       Borderline High 130-159 mg/dl       High            160-189 mg/dl       Very High       >189 mg/dl         This screening LDL is a calculated result.   It does not have the accuracy of the Direct Measured LDL in the monitoring of patients with hyperlipidemia and/or statin therapy.   Direct Measure LDL (JBQ311) must be ordered separately in these patients.       Transcribed for Dipti Lagunas MD, by Royal Gill on 04/16/24 at 2:36 AM.  Powered by [employer].

## 2024-04-04 NOTE — PATIENT INSTRUCTIONS
Continue ibuprofen 800mg 3 times a day as needed for joint pain, take with food  Continue famotidine twice a day  Continue folic acid daily  Continue methotrexate 8 tabs once a week  Continue Vit. D daily  Continue hydroxychloroquine twice a day; continue regular eye exams  Will work on Xeljanz 11mg daily pill prior auth  Do labs now, then again before next visit     Return to clinic in 4 months

## 2024-04-12 ENCOUNTER — APPOINTMENT (OUTPATIENT)
Dept: LAB | Facility: CLINIC | Age: 54
End: 2024-04-12
Payer: COMMERCIAL

## 2024-04-12 DIAGNOSIS — Z79.899 HIGH RISK MEDICATION USE: ICD-10-CM

## 2024-04-12 DIAGNOSIS — M05.79 RHEUMATOID ARTHRITIS INVOLVING MULTIPLE SITES WITH POSITIVE RHEUMATOID FACTOR (HCC): ICD-10-CM

## 2024-04-12 LAB
ALBUMIN SERPL BCP-MCNC: 4.1 G/DL (ref 3.5–5)
ALP SERPL-CCNC: 56 U/L (ref 34–104)
ALT SERPL W P-5'-P-CCNC: 18 U/L (ref 7–52)
ANION GAP SERPL CALCULATED.3IONS-SCNC: 5 MMOL/L (ref 4–13)
AST SERPL W P-5'-P-CCNC: 15 U/L (ref 13–39)
BASOPHILS # BLD AUTO: 0.06 THOUSANDS/ÂΜL (ref 0–0.1)
BASOPHILS NFR BLD AUTO: 1 % (ref 0–1)
BILIRUB SERPL-MCNC: 0.27 MG/DL (ref 0.2–1)
BUN SERPL-MCNC: 23 MG/DL (ref 5–25)
CALCIUM SERPL-MCNC: 9.3 MG/DL (ref 8.4–10.2)
CHLORIDE SERPL-SCNC: 108 MMOL/L (ref 96–108)
CHOLEST SERPL-MCNC: 194 MG/DL
CO2 SERPL-SCNC: 26 MMOL/L (ref 21–32)
CREAT SERPL-MCNC: 0.68 MG/DL (ref 0.6–1.3)
CRP SERPL QL: 5.9 MG/L
EOSINOPHIL # BLD AUTO: 0.19 THOUSAND/ÂΜL (ref 0–0.61)
EOSINOPHIL NFR BLD AUTO: 3 % (ref 0–6)
ERYTHROCYTE [DISTWIDTH] IN BLOOD BY AUTOMATED COUNT: 13.5 % (ref 11.6–15.1)
ERYTHROCYTE [SEDIMENTATION RATE] IN BLOOD: 20 MM/HOUR (ref 0–29)
GFR SERPL CREATININE-BSD FRML MDRD: 98 ML/MIN/1.73SQ M
GLUCOSE P FAST SERPL-MCNC: 94 MG/DL (ref 65–99)
HBV CORE AB SER QL: NORMAL
HBV CORE IGM SER QL: NORMAL
HBV SURFACE AG SER QL: NORMAL
HCT VFR BLD AUTO: 38.4 % (ref 34.8–46.1)
HCV AB SER QL: NORMAL
HDLC SERPL-MCNC: 50 MG/DL
HGB BLD-MCNC: 12.2 G/DL (ref 11.5–15.4)
IMM GRANULOCYTES # BLD AUTO: 0.02 THOUSAND/UL (ref 0–0.2)
IMM GRANULOCYTES NFR BLD AUTO: 0 % (ref 0–2)
LDLC SERPL CALC-MCNC: 112 MG/DL (ref 0–100)
LYMPHOCYTES # BLD AUTO: 1.22 THOUSANDS/ÂΜL (ref 0.6–4.47)
LYMPHOCYTES NFR BLD AUTO: 21 % (ref 14–44)
MCH RBC QN AUTO: 29.6 PG (ref 26.8–34.3)
MCHC RBC AUTO-ENTMCNC: 31.8 G/DL (ref 31.4–37.4)
MCV RBC AUTO: 93 FL (ref 82–98)
MONOCYTES # BLD AUTO: 0.63 THOUSAND/ÂΜL (ref 0.17–1.22)
MONOCYTES NFR BLD AUTO: 11 % (ref 4–12)
NEUTROPHILS # BLD AUTO: 3.82 THOUSANDS/ÂΜL (ref 1.85–7.62)
NEUTS SEG NFR BLD AUTO: 64 % (ref 43–75)
NRBC BLD AUTO-RTO: 0 /100 WBCS
PLATELET # BLD AUTO: 382 THOUSANDS/UL (ref 149–390)
PMV BLD AUTO: 9.5 FL (ref 8.9–12.7)
POTASSIUM SERPL-SCNC: 4.3 MMOL/L (ref 3.5–5.3)
PROT SERPL-MCNC: 7.1 G/DL (ref 6.4–8.4)
RBC # BLD AUTO: 4.12 MILLION/UL (ref 3.81–5.12)
SODIUM SERPL-SCNC: 139 MMOL/L (ref 135–147)
TRIGL SERPL-MCNC: 160 MG/DL
WBC # BLD AUTO: 5.94 THOUSAND/UL (ref 4.31–10.16)

## 2024-04-12 PROCEDURE — 86705 HEP B CORE ANTIBODY IGM: CPT | Performed by: INTERNAL MEDICINE

## 2024-04-12 PROCEDURE — 86704 HEP B CORE ANTIBODY TOTAL: CPT | Performed by: INTERNAL MEDICINE

## 2024-04-12 PROCEDURE — 86803 HEPATITIS C AB TEST: CPT | Performed by: INTERNAL MEDICINE

## 2024-04-12 PROCEDURE — 86480 TB TEST CELL IMMUN MEASURE: CPT | Performed by: INTERNAL MEDICINE

## 2024-04-12 PROCEDURE — 87340 HEPATITIS B SURFACE AG IA: CPT | Performed by: INTERNAL MEDICINE

## 2024-04-13 LAB
GAMMA INTERFERON BACKGROUND BLD IA-ACNC: 0.04 IU/ML
M TB IFN-G BLD-IMP: NEGATIVE
M TB IFN-G CD4+ BCKGRND COR BLD-ACNC: 0.02 IU/ML
M TB IFN-G CD4+ BCKGRND COR BLD-ACNC: 0.02 IU/ML
MITOGEN IGNF BCKGRD COR BLD-ACNC: 9.46 IU/ML

## 2024-04-16 ENCOUNTER — TELEPHONE (OUTPATIENT)
Dept: RHEUMATOLOGY | Facility: CLINIC | Age: 54
End: 2024-04-16

## 2024-04-16 ENCOUNTER — TELEPHONE (OUTPATIENT)
Age: 54
End: 2024-04-16

## 2024-04-16 NOTE — TELEPHONE ENCOUNTER
Patient is calling because they are in a lot of pain and wanted to know if Dr. Lagunas can call in a taper of prednisone for them because they are takinbg a lot of motrin which is not good for their stomach. Please call this into [facility] pharmacy. Thank you.

## 2024-04-16 NOTE — TELEPHONE ENCOUNTER
PA for Xeljanz 11mg tablet    Submitted via    []CMM-KEY   [x][employer]-Case ID # [id number]   []Faxed to plan   []Other website   []Phone call Case ID #     Office notes sent, clinical questions answered. Awaiting determination    Turnaround time for your insurance to make a decision on your Prior Authorization can take 7-21 business days.

## 2024-04-16 NOTE — TELEPHONE ENCOUNTER
Please work on prior authorization for patient's Xeljanz (tofacitinib) 11mg po daily for her seropositive rheumatoid arthritis.  She has tried and failed or had adverse effects to Humira, Orencia, Actemra, and lately Rinvoq. Is currenlty on hydroxychloroquine and methotrexate, which are not enough to control her disease. Has recent negative viral hepatitis panel and TB test on file. Is up to date on her vaccinations and has no active infections.

## 2024-04-17 DIAGNOSIS — M05.79 RHEUMATOID ARTHRITIS INVOLVING MULTIPLE SITES WITH POSITIVE RHEUMATOID FACTOR (HCC): Primary | ICD-10-CM

## 2024-04-17 DIAGNOSIS — M05.79 RHEUMATOID ARTHRITIS INVOLVING MULTIPLE SITES WITH POSITIVE RHEUMATOID FACTOR (HCC): ICD-10-CM

## 2024-04-17 RX ORDER — TOFACITINIB 11 MG/1
11 TABLET, FILM COATED, EXTENDED RELEASE ORAL DAILY
Qty: 30 TABLET | Refills: 11 | Status: SHIPPED | OUTPATIENT
Start: 2024-04-17

## 2024-04-17 RX ORDER — PREDNISONE 10 MG/1
TABLET ORAL DAILY
Qty: 32 TABLET | Refills: 0 | Status: SHIPPED | OUTPATIENT
Start: 2024-04-17 | End: 2024-05-03

## 2024-04-17 NOTE — TELEPHONE ENCOUNTER
PA for Xeljanz Approved   Date(s) approved 4/16/24-10/16/24  Case #[id number]     Patient advised by [x] [employer] Message                      [] Phone call       Pharmacy advised by [x]Fax                                     []Phone call    Approval letter scanned into Media Yes    [facility]

## 2024-04-22 NOTE — TELEPHONE ENCOUNTER
Please make sure patient has been in touch with [facility] regarding arranging Xeljanz delivery

## 2024-04-23 NOTE — TELEPHONE ENCOUNTER
Left message for patient in regards to checking if the medication xeljanz was ever delivered through the [facility], gave the pharmacy phone number in case they did not have it to arrange delivery, advised to call the office with questions/concerns.

## 2024-06-07 ENCOUNTER — OFFICE VISIT (OUTPATIENT)
Dept: OBGYN CLINIC | Facility: CLINIC | Age: 54
End: 2024-06-07
Payer: COMMERCIAL

## 2024-06-07 VITALS
WEIGHT: 213 LBS | BODY MASS INDEX: 39.2 KG/M2 | SYSTOLIC BLOOD PRESSURE: 133 MMHG | HEIGHT: 62 IN | HEART RATE: 87 BPM | DIASTOLIC BLOOD PRESSURE: 84 MMHG

## 2024-06-07 DIAGNOSIS — M17.12 PATELLOFEMORAL ARTHRITIS OF LEFT KNEE: Primary | ICD-10-CM

## 2024-06-07 DIAGNOSIS — M17.12 PRIMARY OSTEOARTHRITIS OF LEFT KNEE: ICD-10-CM

## 2024-06-07 PROCEDURE — 99213 OFFICE O/P EST LOW 20 MIN: CPT | Performed by: ORTHOPAEDIC SURGERY

## 2024-06-07 PROCEDURE — 20610 DRAIN/INJ JOINT/BURSA W/O US: CPT

## 2024-06-07 RX ORDER — TRIAMCINOLONE ACETONIDE 40 MG/ML
40 INJECTION, SUSPENSION INTRA-ARTICULAR; INTRAMUSCULAR
Status: COMPLETED | OUTPATIENT
Start: 2024-06-07 | End: 2024-06-07

## 2024-06-07 RX ORDER — METHYLPREDNISOLONE 4 MG/1
TABLET ORAL
Qty: 1 EACH | Refills: 0 | Status: SHIPPED | OUTPATIENT
Start: 2024-06-07

## 2024-06-07 RX ORDER — BUPIVACAINE HYDROCHLORIDE 2.5 MG/ML
2 INJECTION, SOLUTION INFILTRATION; PERINEURAL
Status: COMPLETED | OUTPATIENT
Start: 2024-06-07 | End: 2024-06-07

## 2024-06-07 RX ADMIN — TRIAMCINOLONE ACETONIDE 40 MG: 40 INJECTION, SUSPENSION INTRA-ARTICULAR; INTRAMUSCULAR at 09:15

## 2024-06-07 RX ADMIN — BUPIVACAINE HYDROCHLORIDE 2 ML: 2.5 INJECTION, SOLUTION INFILTRATION; PERINEURAL at 09:15

## 2024-06-07 NOTE — PROGRESS NOTES
Assessment:  1. Patellofemoral arthritis of left knee  methylPREDNISolone 4 MG tablet therapy pack      2. Primary osteoarthritis of left knee  methylPREDNISolone 4 MG tablet therapy pack        Patient Active Problem List   Diagnosis    Rheumatoid arthritis involving multiple sites with positive rheumatoid factor (HCC)    High risk medication use    Arthritis    Calcific tendinitis    Cervical pain    COVID-19    Low back pain with sciatica    Lumbar radiculopathy    Lumbar disc herniation    Lumbar spondylosis    Patellofemoral arthritis of left knee    Lower extremity edema    Folliculitis    Primary osteoarthritis of left knee    Onychomycosis of toenail       Plan:    54 y.o. female  with patellofemoral pain syndrome and osteoarthritis of the left knee    Discussed steroid injection of the left knee.  Discussed side effects and risks.  Patient agreeable.  Injection administered.  Discussed Medrol Dosepak to help with persistent pain and inflammation secondary to underlying patellofemoral pain syndrome.  Discussed side effects and risks.  Patient agreeable.  Prescription sent to pharmacy.  Discussed if continued pain in the medial aspect with recurrent swelling will consider MRI of the left knee if persistent pain than 6 weeks after completion of Medrol Dosepak.    The assessment and plan were formulated by Dr. Howard and I assisted in carrying it out.    Subjective:   Patient ID: Alison Hercules is a 54 y.o. female .    HPI    Patient presents to the office for follow up of left knee pain.  Patient notes persistent pain in the anterior medial aspect of the left knee.  Patient notes periods of swelling after being at work all day as she is on her feet all day.  Denies great relief with gel injections completed in January. Patient seeking CSI of the left knee    The following portions of the patient's history were reviewed and updated as appropriate: allergies, current medications, past family history, past social  history, past surgical history and problem list.    Social History     Socioeconomic History    Marital status: Single     Spouse name: Not on file    Number of children: Not on file    Years of education: Not on file    Highest education level: Not on file   Occupational History    Not on file   Tobacco Use    Smoking status: Former     Current packs/day: 1.00     Average packs/day: 1 pack/day for 35.0 years (35.0 ttl pk-yrs)     Types: Cigarettes    Smokeless tobacco: Never    Tobacco comments:     1 1/2 yrs vaping now juul.   Vaping Use    Vaping status: Every Day    Substances: Nicotine, Flavoring   Substance and Sexual Activity    Alcohol use: Yes     Comment: Social 1-5 times a month    Drug use: Never    Sexual activity: Yes     Partners: Female   Other Topics Concern    Not on file   Social History Narrative    Not on file     Social Determinants of Health     Financial Resource Strain: Not on file   Food Insecurity: Not on file   Transportation Needs: Not on file   Physical Activity: Not on file   Stress: Not on file   Social Connections: Not on file   Intimate Partner Violence: Not on file   Housing Stability: Not on file     Past Medical History:   Diagnosis Date    Allergic     Seasonal    Anxiety     Depression     Knee pain     Pinched nerve     L5, L2    Rheumatoid arthritis (HCC)      Past Surgical History:   Procedure Laterality Date    ROTATOR CUFF REPAIR       Allergies   Allergen Reactions    Clarithromycin GI Intolerance    Seasonal Ic [Cholestatin] Other (See Comments)     stuffiness     Current Outpatient Medications on File Prior to Visit   Medication Sig Dispense Refill    Ascorbic Acid (vitamin C) 1000 MG tablet Take 1,000 mg by mouth daily      cholecalciferol (VITAMIN D3) 1,000 units tablet Take 2,000 Units by mouth      famotidine (PEPCID) 20 mg tablet Take 1 tablet (20 mg total) by mouth 2 (two) times a day 180 tablet 3    folic acid (FOLVITE) 1 mg tablet Take 1 tablet (1 mg total) by  mouth daily 90 tablet 3    furosemide (LASIX) 20 mg tablet Take 1 tablet (20 mg total) by mouth 2 (two) times a day (Patient taking differently: Take 20 mg by mouth 2 (two) times a day PRN) 60 tablet 2    hydroxychloroquine (PLAQUENIL) 200 mg tablet Take 1 tablet (200 mg total) by mouth 2 (two) times a day 180 tablet 3    ibuprofen (MOTRIN) 800 mg tablet Take 1 tablet (800 mg total) by mouth 2 (two) times a day 180 tablet 3    magnesium gluconate (MAGONATE) 500 mg tablet Take 500 mg by mouth daily      methotrexate 2.5 MG tablet TAKE EIGHT TABLETS BY MOUTH ONCE A WEEK. take all 8 tablets on the same day every week 96 tablet 3    Milk Thistle 250 MG CAPS Take by mouth      multivitamin (THERAGRAN) TABS Take 1 tablet by mouth daily      pyridoxine (B-6) 100 MG tablet Take 100 mg by mouth      Tofacitinib Citrate ER (Xeljanz XR) 11 MG TB24 Take 1 tablet (11 mg total) by mouth daily 30 tablet 11    Turmeric, Curcuma Longa, (Curcumin Extract) POWD Use Take 700 mg -  Herbal supplement      vitamin B-12 (VITAMIN B-12) 1,000 mcg tablet Take by mouth daily      zinc gluconate 50 mg tablet Take 50 mg by mouth daily      methylprednisolone (MEDROL) 4 mg tablet 4 tabs once daily x5 days, then 3 tabs once daily x5 days, then 2 tabs once daily x5 days, then 1 tab once daily x5 days (Patient not taking: Reported on 4/4/2024) 50 tablet 0    [DISCONTINUED] albuterol (Ventolin HFA) 90 mcg/act inhaler Inhale 2 puffs every 6 (six) hours as needed for wheezing or shortness of breath (Patient not taking: Reported on 1/19/2024) 18 g 1    [DISCONTINUED] aluminum chloride (DRYSOL) 20 % external solution Apply topically daily at bedtime (Patient not taking: Reported on 11/16/2023) 60 mL 1    [DISCONTINUED] ketoconazole (NIZORAL) 2 % cream apply topically daily (Patient not taking: Reported on 1/19/2024) 60 g 0    [DISCONTINUED] methocarbamol (Robaxin-750) 750 mg tablet Take 1 tablet (750 mg total) by mouth every 6 (six) hours as needed for  muscle spasms (Patient not taking: Reported on 11/16/2023) 40 tablet 0    [DISCONTINUED] phentermine 15 MG capsule Take 1 capsule (15 mg total) by mouth every morning (Patient not taking: Reported on 11/16/2023) 30 capsule 0     No current facility-administered medications on file prior to visit.       Review of Systems   Constitutional:  Negative for chills and fever.   HENT:  Negative for ear pain and sore throat.    Eyes:  Negative for pain and visual disturbance.   Respiratory:  Negative for cough and shortness of breath.    Cardiovascular:  Negative for chest pain and palpitations.   Gastrointestinal:  Negative for abdominal pain and vomiting.   Genitourinary:  Negative for dysuria and hematuria.   Musculoskeletal:  Positive for arthralgias. Negative for back pain.   Skin:  Negative for color change and rash.   Neurological:  Negative for seizures and syncope.   All other systems reviewed and are negative.    See HPi    Objective:    Vitals:    06/07/24 0907   BP: 133/84   Pulse: 87       Physical Exam  Vitals and nursing note reviewed.   Constitutional:       General: She is not in acute distress.     Appearance: She is well-developed.   HENT:      Head: Normocephalic and atraumatic.   Eyes:      Conjunctiva/sclera: Conjunctivae normal.   Cardiovascular:      Rate and Rhythm: Normal rate and regular rhythm.      Heart sounds: No murmur heard.  Pulmonary:      Effort: Pulmonary effort is normal. No respiratory distress.      Breath sounds: Normal breath sounds.   Abdominal:      Palpations: Abdomen is soft.      Tenderness: There is no abdominal tenderness.   Musculoskeletal:         General: No swelling.      Cervical back: Neck supple.   Skin:     General: Skin is warm and dry.      Capillary Refill: Capillary refill takes less than 2 seconds.   Neurological:      Mental Status: She is alert.   Psychiatric:         Mood and Affect: Mood normal.         Left Knee Exam     Tenderness   The patient is  "experiencing tenderness in the medial joint line and patella.    Range of Motion   Extension:  normal   Flexion:  normal     Other   Swelling: mild    Comments:  Positive patellar grind          Large joint arthrocentesis: L knee  Universal Protocol:  Consent: Verbal consent obtained.  Consent given by: patient  Patient understanding: patient states understanding of the procedure being performed  Patient identity confirmed: verbally with patient  Supporting Documentation  Indications: pain   Procedure Details  Location: knee - L knee  Needle size: 22 G  Ultrasound guidance: no  Approach: anterolateral  Medications administered: 2 mL bupivacaine 0.25 %; 40 mg triamcinolone acetonide 40 mg/mL    Patient tolerance: patient tolerated the procedure well with no immediate complications  Dressing:  Sterile dressing applied            Portions of the record may have been created with voice recognition software.  Occasional wrong word or \"sound a like\" substitutions may have occurred due to the inherent limitations of voice recognition software.  Read the chart carefully and recognize, using context, where substitutions have occurred.   "

## 2024-06-20 ENCOUNTER — OFFICE VISIT (OUTPATIENT)
Dept: FAMILY MEDICINE CLINIC | Facility: CLINIC | Age: 54
End: 2024-06-20
Payer: COMMERCIAL

## 2024-06-20 VITALS
RESPIRATION RATE: 16 BRPM | SYSTOLIC BLOOD PRESSURE: 114 MMHG | TEMPERATURE: 97.2 F | OXYGEN SATURATION: 97 % | WEIGHT: 208.2 LBS | BODY MASS INDEX: 38.31 KG/M2 | HEART RATE: 90 BPM | DIASTOLIC BLOOD PRESSURE: 76 MMHG | HEIGHT: 62 IN

## 2024-06-20 DIAGNOSIS — J06.9 UPPER RESPIRATORY TRACT INFECTION, UNSPECIFIED TYPE: Primary | ICD-10-CM

## 2024-06-20 LAB
S PYO AG THROAT QL: NEGATIVE
SARS-COV-2 AG UPPER RESP QL IA: NEGATIVE
VALID CONTROL: NORMAL

## 2024-06-20 PROCEDURE — 99213 OFFICE O/P EST LOW 20 MIN: CPT | Performed by: NURSE PRACTITIONER

## 2024-06-20 PROCEDURE — 87880 STREP A ASSAY W/OPTIC: CPT | Performed by: NURSE PRACTITIONER

## 2024-06-20 PROCEDURE — 87811 SARS-COV-2 COVID19 W/OPTIC: CPT | Performed by: NURSE PRACTITIONER

## 2024-06-20 RX ORDER — AZITHROMYCIN 250 MG/1
TABLET, FILM COATED ORAL
Qty: 6 TABLET | Refills: 0 | Status: SHIPPED | OUTPATIENT
Start: 2024-06-20 | End: 2024-06-24

## 2024-06-20 NOTE — PROGRESS NOTES
"Ambulatory Visit  Name: Alison Hercules      : 1970      MRN: [id number]  Encounter Provider: ELVIRA Le  Encounter Date: 2024   Enc[place] [facility]    Assessment & Plan   1. Upper respiratory tract infection, unspecified type  Assessment & Plan:  Symptoms and exam more suggestive of allergies v viral uri.  Vitals wnl, lungs clear.  Sinus mucosa swollen and pale and post-nasal drip noted.  Recommend flonase, daily antihistamine, hydration, nutrition.  Will send prescription for azithromycin as a back up should symptoms worsen or fail to improve.  She is unable to take rheumatoid arthritis meds when sick.    Orders:  -     POCT rapid ANTIGEN strepA  -     POCT Rapid Covid Ag  -     azithromycin (ZITHROMAX) 250 mg tablet; Take 2 tablets today then 1 tablet daily x 4 days       History of Present Illness     Pt is a 54 y.o. female who is seen today for evaluation of cold symptoms.  Past medical history of low back pain, rheumatoid arthritis, edema.  She started with symptoms about 3-4 days ago; onset with a dry cough and a \"raw\" throat.  Her sinus fluctuate between rhinorrhea and congestion.  She if fatigued, her ears ache.  Her appetite is decreased, she has some diarrhea but no n/v.  She is hydrating.  No fever, chills, headache.  She is not taking anything for her symptoms.       Review of Systems   Constitutional:  Positive for appetite change and fatigue. Negative for chills and fever.   HENT:  Positive for congestion, ear pain, rhinorrhea and sore throat. Negative for postnasal drip and sinus pressure.    Respiratory:  Positive for cough and wheezing. Negative for chest tightness, shortness of breath and stridor.    Cardiovascular:  Negative for chest pain and palpitations.   Gastrointestinal:  Positive for diarrhea. Negative for nausea and vomiting.   Musculoskeletal:  Negative for myalgias.   Neurological:  Negative for headaches.   Hematological:  " "Negative for adenopathy.   Psychiatric/Behavioral:  Negative for sleep disturbance.        Objective     /76 (BP Location: Left arm, Patient Position: Sitting, Cuff Size: Standard)   Pulse 90   Temp (!) 97.2 °F (36.2 °C) (Tympanic)   Resp 16   Ht 5' 2\" (1.575 m)   Wt 94.4 kg (208 lb 3.2 oz)   SpO2 97%   BMI 38.08 kg/m²     Physical Exam  Vitals reviewed.   Constitutional:       General: She is awake. She is not in acute distress.     Appearance: Normal appearance. She is well-developed and well-groomed. She is not ill-appearing.   HENT:      Head: Normocephalic.      Right Ear: Hearing, ear canal and external ear normal. No middle ear effusion. Tympanic membrane is bulging. Tympanic membrane is not erythematous.      Left Ear: Hearing, ear canal and external ear normal.  No middle ear effusion. Tympanic membrane is bulging. Tympanic membrane is not erythematous.      Nose: Mucosal edema and congestion present. No rhinorrhea.      Right Turbinates: Enlarged and pale.      Left Turbinates: Enlarged and pale.      Mouth/Throat:      Lips: Pink.      Mouth: Mucous membranes are not dry.      Pharynx: No oropharyngeal exudate (post nasal drip noted) or posterior oropharyngeal erythema.      Tonsils: No tonsillar abscesses.   Eyes:      Conjunctiva/sclera: Conjunctivae normal.   Cardiovascular:      Rate and Rhythm: Normal rate and regular rhythm.      Heart sounds: Normal heart sounds. No murmur heard.  Pulmonary:      Effort: Pulmonary effort is normal. No accessory muscle usage or respiratory distress.      Breath sounds: Normal breath sounds. No decreased breath sounds, wheezing, rhonchi or rales.   Lymphadenopathy:      Head:      Right side of head: No submental, submandibular, tonsillar, preauricular, posterior auricular or occipital adenopathy.      Left side of head: No submental, submandibular, tonsillar, preauricular, posterior auricular or occipital adenopathy.      Cervical: No cervical " adenopathy.   Skin:     General: Skin is warm and dry.   Neurological:      Mental Status: She is alert and oriented to person, place, and time.   Psychiatric:         Attention and Perception: Attention normal.         Mood and Affect: Mood normal.         Speech: Speech normal.         Behavior: Behavior normal. Behavior is cooperative.         Thought Content: Thought content normal.         Cognition and Memory: Cognition normal.         Judgment: Judgment normal.       Administrative Statements

## 2024-06-20 NOTE — ASSESSMENT & PLAN NOTE
Symptoms and exam more suggestive of allergies v viral uri.  Vitals wnl, lungs clear.  Sinus mucosa swollen and pale and post-nasal drip noted.  Recommend flonase, daily antihistamine, hydration, nutrition.  Will send prescription for azithromycin as a back up should symptoms worsen or fail to improve.  She is unable to take rheumatoid arthritis meds when sick.

## 2024-08-08 ENCOUNTER — APPOINTMENT (OUTPATIENT)
Dept: LAB | Facility: CLINIC | Age: 54
End: 2024-08-08
Payer: COMMERCIAL

## 2024-08-08 ENCOUNTER — OFFICE VISIT (OUTPATIENT)
Dept: RHEUMATOLOGY | Facility: CLINIC | Age: 54
End: 2024-08-08
Payer: COMMERCIAL

## 2024-08-08 VITALS
WEIGHT: 212 LBS | DIASTOLIC BLOOD PRESSURE: 82 MMHG | BODY MASS INDEX: 39.01 KG/M2 | SYSTOLIC BLOOD PRESSURE: 122 MMHG | HEIGHT: 62 IN

## 2024-08-08 DIAGNOSIS — M05.79 RHEUMATOID ARTHRITIS INVOLVING MULTIPLE SITES WITH POSITIVE RHEUMATOID FACTOR (HCC): Primary | ICD-10-CM

## 2024-08-08 DIAGNOSIS — M05.79 RHEUMATOID ARTHRITIS INVOLVING MULTIPLE SITES WITH POSITIVE RHEUMATOID FACTOR (HCC): ICD-10-CM

## 2024-08-08 DIAGNOSIS — M17.12 OSTEOARTHRITIS OF LEFT KNEE, UNSPECIFIED OSTEOARTHRITIS TYPE: ICD-10-CM

## 2024-08-08 DIAGNOSIS — M70.71 BURSITIS OF RIGHT HIP, UNSPECIFIED BURSA: ICD-10-CM

## 2024-08-08 DIAGNOSIS — Z79.899 HIGH RISK MEDICATION USE: ICD-10-CM

## 2024-08-08 PROBLEM — M25.551 RIGHT HIP PAIN: Status: ACTIVE | Noted: 2024-08-08

## 2024-08-08 LAB
ALBUMIN SERPL BCG-MCNC: 4.5 G/DL (ref 3.5–5)
ALP SERPL-CCNC: 59 U/L (ref 34–104)
ALT SERPL W P-5'-P-CCNC: 16 U/L (ref 7–52)
ANION GAP SERPL CALCULATED.3IONS-SCNC: 7 MMOL/L (ref 4–13)
AST SERPL W P-5'-P-CCNC: 18 U/L (ref 13–39)
BASOPHILS # BLD AUTO: 0.06 THOUSANDS/ÂΜL (ref 0–0.1)
BASOPHILS NFR BLD AUTO: 1 % (ref 0–1)
BILIRUB SERPL-MCNC: 0.19 MG/DL (ref 0.2–1)
BUN SERPL-MCNC: 28 MG/DL (ref 5–25)
CALCIUM SERPL-MCNC: 9.7 MG/DL (ref 8.4–10.2)
CHLORIDE SERPL-SCNC: 104 MMOL/L (ref 96–108)
CO2 SERPL-SCNC: 28 MMOL/L (ref 21–32)
CREAT SERPL-MCNC: 1.12 MG/DL (ref 0.6–1.3)
CRP SERPL QL: 3 MG/L
EOSINOPHIL # BLD AUTO: 0.13 THOUSAND/ÂΜL (ref 0–0.61)
EOSINOPHIL NFR BLD AUTO: 1 % (ref 0–6)
ERYTHROCYTE [DISTWIDTH] IN BLOOD BY AUTOMATED COUNT: 14.3 % (ref 11.6–15.1)
ERYTHROCYTE [SEDIMENTATION RATE] IN BLOOD: 15 MM/HOUR (ref 0–29)
GFR SERPL CREATININE-BSD FRML MDRD: 55 ML/MIN/1.73SQ M
GLUCOSE SERPL-MCNC: 88 MG/DL (ref 65–140)
HCT VFR BLD AUTO: 37.3 % (ref 34.8–46.1)
HGB BLD-MCNC: 12.3 G/DL (ref 11.5–15.4)
IMM GRANULOCYTES # BLD AUTO: 0.02 THOUSAND/UL (ref 0–0.2)
IMM GRANULOCYTES NFR BLD AUTO: 0 % (ref 0–2)
LYMPHOCYTES # BLD AUTO: 1.84 THOUSANDS/ÂΜL (ref 0.6–4.47)
LYMPHOCYTES NFR BLD AUTO: 20 % (ref 14–44)
MCH RBC QN AUTO: 30.4 PG (ref 26.8–34.3)
MCHC RBC AUTO-ENTMCNC: 33 G/DL (ref 31.4–37.4)
MCV RBC AUTO: 92 FL (ref 82–98)
MONOCYTES # BLD AUTO: 0.85 THOUSAND/ÂΜL (ref 0.17–1.22)
MONOCYTES NFR BLD AUTO: 9 % (ref 4–12)
NEUTROPHILS # BLD AUTO: 6.12 THOUSANDS/ÂΜL (ref 1.85–7.62)
NEUTS SEG NFR BLD AUTO: 69 % (ref 43–75)
NRBC BLD AUTO-RTO: 0 /100 WBCS
PLATELET # BLD AUTO: 350 THOUSANDS/UL (ref 149–390)
PMV BLD AUTO: 9.5 FL (ref 8.9–12.7)
POTASSIUM SERPL-SCNC: 4.4 MMOL/L (ref 3.5–5.3)
PROT SERPL-MCNC: 7.7 G/DL (ref 6.4–8.4)
RBC # BLD AUTO: 4.05 MILLION/UL (ref 3.81–5.12)
SODIUM SERPL-SCNC: 139 MMOL/L (ref 135–147)
WBC # BLD AUTO: 9.02 THOUSAND/UL (ref 4.31–10.16)

## 2024-08-08 PROCEDURE — 99214 OFFICE O/P EST MOD 30 MIN: CPT | Performed by: INTERNAL MEDICINE

## 2024-08-08 PROCEDURE — 36415 COLL VENOUS BLD VENIPUNCTURE: CPT

## 2024-08-08 PROCEDURE — 85652 RBC SED RATE AUTOMATED: CPT

## 2024-08-08 PROCEDURE — 86140 C-REACTIVE PROTEIN: CPT

## 2024-08-08 PROCEDURE — 85025 COMPLETE CBC W/AUTO DIFF WBC: CPT

## 2024-08-08 PROCEDURE — 80053 COMPREHEN METABOLIC PANEL: CPT

## 2024-08-08 RX ORDER — IBUPROFEN 800 MG/1
800 TABLET, FILM COATED ORAL 2 TIMES DAILY
Qty: 180 TABLET | Refills: 3 | Status: SHIPPED | OUTPATIENT
Start: 2024-08-08

## 2024-08-08 RX ORDER — METHOTREXATE 2.5 MG/1
TABLET ORAL
Qty: 96 TABLET | Refills: 3 | Status: SHIPPED | OUTPATIENT
Start: 2024-08-08

## 2024-08-08 RX ORDER — HYDROXYCHLOROQUINE SULFATE 200 MG/1
200 TABLET, FILM COATED ORAL 2 TIMES DAILY
Qty: 180 TABLET | Refills: 3 | Status: SHIPPED | OUTPATIENT
Start: 2024-08-08 | End: 2025-08-03

## 2024-08-08 RX ORDER — FOLIC ACID 1 MG/1
1 TABLET ORAL DAILY
Qty: 90 TABLET | Refills: 3 | Status: SHIPPED | OUTPATIENT
Start: 2024-08-08

## 2024-08-08 RX ORDER — FAMOTIDINE 20 MG/1
20 TABLET, FILM COATED ORAL 2 TIMES DAILY
Qty: 180 TABLET | Refills: 3 | Status: SHIPPED | OUTPATIENT
Start: 2024-08-08

## 2024-08-08 NOTE — PATIENT INSTRUCTIONS
Continue ibuprofen 800mg 3 times a day as needed for joint pain, take with food  Continue famotidine twice a day  Continue folic acid daily  Continue methotrexate 8 tabs once a week  Continue Vit. D daily  Continue hydroxychloroquine twice a day; continue regular eye exams  Continue Xeljanz 11mg daily pill   Do labs now, then every 3 months  Do hip bursitis exercises     Return to clinic in 4 months

## 2024-08-08 NOTE — PROGRESS NOTES
Assessment and Plan:   Alison Hercules is a 54 y.o.  female who presents for follow up visit for seropostive rheumatoid arthritis. Currently managed and stable on Tofacitinib Citrate (Xeljanz). Her RA symptoms are worse in the rain and winter. She does have morning stiffness upto 2 hours. Patient's rheumatologic disease(s) threaten long-term function if not appropriately treated.  She endorses right hip pain and its ongoing since 3 months. She describes it as a dull ache during the day while walking and standing at work. More noticeable and painful at night, requiring her to reposition. Relieved by laying on her back or on her stomach. She takes ibuprofen 800 mg at 6pm and goes to bed at 8pm but still feels the pain.   Likely differential diagnosis right hip bursitis. We offered steroid injection but patient refused.     Continue ibuprofen 800mg 3 times a day as needed for joint pain, take with food  Continue famotidine twice a day  Continue folic acid daily  Continue methotrexate 8 tabs once a week  Continue Vit. D daily  Continue hydroxychloroquine twice a day; continue regular eye exams  Continue Xeljanz 11mg daily pill   Do labs now, then every 3 months  Do hip bursitis exercises    Plan:  Diagnoses and all orders for this visit:    Rheumatoid arthritis involving multiple sites with positive rheumatoid factor (HCC)  -     CBC and differential; Standing  -     Comprehensive metabolic panel; Standing  -     C-reactive protein; Standing  -     Sedimentation rate, automated; Standing  -     famotidine (PEPCID) 20 mg tablet; Take 1 tablet (20 mg total) by mouth 2 (two) times a day  -     folic acid (FOLVITE) 1 mg tablet; Take 1 tablet (1 mg total) by mouth daily  -     hydroxychloroquine (PLAQUENIL) 200 mg tablet; Take 1 tablet (200 mg total) by mouth 2 (two) times a day  -     ibuprofen (MOTRIN) 800 mg tablet; Take 1 tablet (800 mg total) by mouth 2 (two) times a day  -     methotrexate 2.5 MG tablet; TAKE EIGHT TABLETS  BY MOUTH ONCE A WEEK. take all 8 tablets on the same day every week    Bursitis of right hip, unspecified bursa    Osteoarthritis of left knee, unspecified osteoarthritis type    High risk medication use  -     CBC and differential; Standing  -     Comprehensive metabolic panel; Standing  -     folic acid (FOLVITE) 1 mg tablet; Take 1 tablet (1 mg total) by mouth daily    Other orders  -     COLLAGEN PO; Take by mouth     High risk medication use - Benefits and risks of hydroxychloroquine, including but not limited to retinal toxicity, corneal deposits, gastrointestinal side effects, and headaches were previously discussed with the patient. She is aware of the need for a regular eye exam to monitor for ocular toxicity while on this medication.     Benefits and risks of methotrexate use, including but not limited to gastrointestinal disturbances such as diarrhea, hair loss, fatigue, stomatitis, leukopenia, lung hypersensitivity reaction, hepatotoxicity, and lymphoma were previously discussed with the patient. Baseline viral hepatitis panel was ordered and returned negative.  CBC,CMP will be regularly monitored. Patient does not plan on having any children. Patient was prescribed Folic Acid 1 mg po daily to take while on methotrexate to help prevent side effects. Patient counseled on abstinence from alcohol while using methotrexate.      Benefits and risks of MISSAEL inhibitor biologic agents like Xeljanz were discussed, and include but are not limited to reactivation of hepatitis B/C or TB, diverticular perforation, hyperlipidemia, hepatotoxicity, VTE, and increased risk of infections. A baseline viral hepatitis panel and TB test was checked. CBC/CMP and lipid panel will be monitored regularly.     Follow-up plan: Return to clinic in 4 months          Rheumatic Disease Summary    11/16/23: Patient presents for follow-up of seropositive rheumatoid arthritis, which has come under much better control since starting Rinvoq  few months ago.  She admits to half an hour to 40 minutes of morning stiffness.  Her left knee will swell up at times, has OA in it; steroid injections and hyaluronic acid injections haven't helped; next step is surgery..  Continue ibuprofen 800mg 2 times a day as needed for joint pain  Continue famotidine twice a day  Continue folic acid daily  Continue methotrexate 8 tabs once a week  Continue Vit. D daily  Continue hydroxychloroquine twice a day; continue regular eye exams  Continue daily Rinvoq 15mg po   Do labs before next visit    04/04/2024 Pt presents for follow-up of her seropostive rheumatoid arthritis, which has become uncontrolled again and is progressing.  She was initially doing well on Rinvoq, but it has been losing efficacy, and it caused her to develop acne.  She admits to 30 minutes of morning stiffness.  She may be a good candidate for Xeljanz 11 mg daily instead.  Will work on prior authorization. Patient's rheumatologic disease(s) threaten long-term function if not appropriately treated. Left knee OA is still an issue; seeing Orthopedics.    Continue ibuprofen 800mg 3 times a day as needed for joint pain, take with food  Continue famotidine twice a day  Continue folic acid daily  Continue methotrexate 8 tabs once a week  Continue Vit. D daily  Continue hydroxychloroquine twice a day; continue regular eye exams  Will work on Xeljanz 11mg daily pill prior auth  Do labs now, then again before next visit       Chief Complaint  No chief complaint on file.      AC Hercules is a 54 y.o.  female who presents for follow up of rheumatoid arthritis which was diagnosed in 2020. She was previously managed on Rinvoq but stopped responding to it and switched to Xeljanz.  Currently her rheumatoid arthritis appears to be stable.    The following portions of the patient's history were reviewed and updated as appropriate: allergies, current medications, past family history, past medical history, past social  history, past surgical history and problem list.    Review of Systems:   Review of Systems   Constitutional: Negative.    HENT: Negative.     Eyes: Negative.    Respiratory: Negative.     Cardiovascular: Negative.    Gastrointestinal: Negative.    Endocrine: Negative.    Genitourinary: Negative.    Musculoskeletal:  Positive for arthralgias and myalgias.        Leg swelling  R Hip pain   Skin: Negative.    Allergic/Immunologic: Negative.    Neurological: Negative.    Hematological: Negative.    Psychiatric/Behavioral: Negative.       Reviewed and agree.    Home Medications:    Current Outpatient Medications:     Ascorbic Acid (vitamin C) 1000 MG tablet, Take 1,000 mg by mouth daily, Disp: , Rfl:     cholecalciferol (VITAMIN D3) 1,000 units tablet, Take 2,000 Units by mouth, Disp: , Rfl:     COLLAGEN PO, Take by mouth, Disp: , Rfl:     famotidine (PEPCID) 20 mg tablet, Take 1 tablet (20 mg total) by mouth 2 (two) times a day, Disp: 180 tablet, Rfl: 3    folic acid (FOLVITE) 1 mg tablet, Take 1 tablet (1 mg total) by mouth daily, Disp: 90 tablet, Rfl: 3    hydroxychloroquine (PLAQUENIL) 200 mg tablet, Take 1 tablet (200 mg total) by mouth 2 (two) times a day, Disp: 180 tablet, Rfl: 3    ibuprofen (MOTRIN) 800 mg tablet, Take 1 tablet (800 mg total) by mouth 2 (two) times a day, Disp: 180 tablet, Rfl: 3    magnesium gluconate (MAGONATE) 500 mg tablet, Take 500 mg by mouth daily, Disp: , Rfl:     methotrexate 2.5 MG tablet, TAKE EIGHT TABLETS BY MOUTH ONCE A WEEK. take all 8 tablets on the same day every week, Disp: 96 tablet, Rfl: 3    Milk Thistle 250 MG CAPS, Take by mouth, Disp: , Rfl:     multivitamin (THERAGRAN) TABS, Take 1 tablet by mouth daily, Disp: , Rfl:     pyridoxine (B-6) 100 MG tablet, Take 100 mg by mouth, Disp: , Rfl:     Tofacitinib Citrate ER (Xeljanz XR) 11 MG TB24, Take 1 tablet (11 mg total) by mouth daily, Disp: 30 tablet, Rfl: 11    Turmeric, Curcuma Longa, (Curcumin Extract) POWD, Use Take 700  "mg -  Herbal supplement, Disp: , Rfl:     vitamin B-12 (VITAMIN B-12) 1,000 mcg tablet, Take by mouth daily, Disp: , Rfl:     zinc gluconate 50 mg tablet, Take 50 mg by mouth daily, Disp: , Rfl:     furosemide (LASIX) 20 mg tablet, Take 1 tablet (20 mg total) by mouth 2 (two) times a day (Patient taking differently: Take 20 mg by mouth 2 (two) times a day PRN), Disp: 60 tablet, Rfl: 2    Objective:    Vitals:    08/08/24 1421   BP: 122/82   Weight: 96.2 kg (212 lb)   Height: 5' 2\" (1.575 m)       Physical Exam  Constitutional:       General: She is not in acute distress.     Appearance: Normal appearance.   HENT:      Nose: Nose normal.      Mouth/Throat:      Mouth: Mucous membranes are moist.      Pharynx: Oropharynx is clear.   Eyes:      Conjunctiva/sclera: Conjunctivae normal.      Pupils: Pupils are equal, round, and reactive to light.   Cardiovascular:      Rate and Rhythm: Normal rate and regular rhythm.      Heart sounds: Normal heart sounds.   Pulmonary:      Effort: No respiratory distress.      Breath sounds: Normal breath sounds. No wheezing.   Abdominal:      Palpations: Abdomen is soft.      Tenderness: There is no abdominal tenderness.   Musculoskeletal:         General: Tenderness (Right lateral hip) present. No swelling.      Cervical back: No rigidity.   Skin:     General: Skin is warm and dry.   Neurological:      Mental Status: She is alert and oriented to person, place, and time.   Psychiatric:         Mood and Affect: Mood normal.         Behavior: Behavior normal.       Reviewed labs and imaging.    Imaging:   No results found.    Labs:   Appointment on 08/08/2024   Component Date Value Ref Range Status    WBC 08/08/2024 9.02  4.31 - 10.16 Thousand/uL Final    RBC 08/08/2024 4.05  3.81 - 5.12 Million/uL Final    Hemoglobin 08/08/2024 12.3  11.5 - 15.4 g/dL Final    Hematocrit 08/08/2024 37.3  34.8 - 46.1 % Final    MCV 08/08/2024 92  82 - 98 fL Final    MCH 08/08/2024 30.4  26.8 - 34.3 pg " Final    MCHC 08/08/2024 33.0  31.4 - 37.4 g/dL Final    RDW 08/08/2024 14.3  11.6 - 15.1 % Final    MPV 08/08/2024 9.5  8.9 - 12.7 fL Final    Platelets 08/08/2024 350  149 - 390 Thousands/uL Final    nRBC 08/08/2024 0  /100 WBCs Final    Segmented % 08/08/2024 69  43 - 75 % Final    Immature Grans % 08/08/2024 0  0 - 2 % Final    Lymphocytes % 08/08/2024 20  14 - 44 % Final    Monocytes % 08/08/2024 9  4 - 12 % Final    Eosinophils Relative 08/08/2024 1  0 - 6 % Final    Basophils Relative 08/08/2024 1  0 - 1 % Final    Absolute Neutrophils 08/08/2024 6.12  1.85 - 7.62 Thousands/µL Final    Absolute Immature Grans 08/08/2024 0.02  0.00 - 0.20 Thousand/uL Final    Absolute Lymphocytes 08/08/2024 1.84  0.60 - 4.47 Thousands/µL Final    Absolute Monocytes 08/08/2024 0.85  0.17 - 1.22 Thousand/µL Final    Eosinophils Absolute 08/08/2024 0.13  0.00 - 0.61 Thousand/µL Final    Basophils Absolute 08/08/2024 0.06  0.00 - 0.10 Thousands/µL Final    Sodium 08/08/2024 139  135 - 147 mmol/L Final    Potassium 08/08/2024 4.4  3.5 - 5.3 mmol/L Final    Chloride 08/08/2024 104  96 - 108 mmol/L Final    CO2 08/08/2024 28  21 - 32 mmol/L Final    ANION GAP 08/08/2024 7  4 - 13 mmol/L Final    BUN 08/08/2024 28 (H)  5 - 25 mg/dL Final    Creatinine 08/08/2024 1.12  0.60 - 1.30 mg/dL Final    Standardized to IDMS reference method    Glucose 08/08/2024 88  65 - 140 mg/dL Final    If the patient is fasting, the ADA then defines impaired fasting glucose as > 100 mg/dL and diabetes as > or equal to 123 mg/dL.    Calcium 08/08/2024 9.7  8.4 - 10.2 mg/dL Final    AST 08/08/2024 18  13 - 39 U/L Final    ALT 08/08/2024 16  7 - 52 U/L Final    Specimen collection should occur prior to Sulfasalazine administration due to the potential for falsely depressed results.     Alkaline Phosphatase 08/08/2024 59  34 - 104 U/L Final    Total Protein 08/08/2024 7.7  6.4 - 8.4 g/dL Final    Albumin 08/08/2024 4.5  3.5 - 5.0 g/dL Final    Total Bilirubin  08/08/2024 0.19 (L)  0.20 - 1.00 mg/dL Final    Use of this assay is not recommended for patients undergoing treatment with eltrombopag due to the potential for falsely elevated results.  N-acetyl-p-benzoquinone imine (metabolite of Acetaminophen) will generate erroneously low results in samples for patients that have taken an overdose of Acetaminophen.    eGFR 08/08/2024 55  ml/min/1.73sq m Final    CRP 08/08/2024 3.0 (H)  <3.0 mg/L Final    Sed Rate 08/08/2024 15  0 - 29 mm/hour Final   Office Visit on 06/20/2024   Component Date Value Ref Range Status     RAPID STREP A 06/20/2024 Negative  Negative Final    POCT SARS-CoV-2 Ag 06/20/2024 Negative  Negative Final    VALID CONTROL 06/20/2024 Valid   Final   Office Visit on 04/04/2024   Component Date Value Ref Range Status    Hepatitis B Surface Ag 04/12/2024 Non-reactive  Non-Reactive Final    Hepatitis C Ab 04/12/2024 Non-reactive  Non-Reactive Final    Hep B C IgM 04/12/2024 Non-reactive  Non-Reactive Final    Hep B Core Total Ab 04/12/2024 Non-reactive  Non-Reactive Final    QFT Nil 04/12/2024 0.04  0 - 8.0 IU/ml Final    QFT TB1-NIL 04/12/2024 0.02  IU/ml Final    QFT TB2-NIL 04/12/2024 0.02  IU/ml Final    QFT Mitogen-NIL 04/12/2024 9.46  IU/ml Final    QFT Final Interpretation 04/12/2024 Negative  Negative Final    No Interferon-gamma response to M. tuberculosis antigens detected.  Infection with M. tuberculosis is unlikely.  A single negative result does not exclude infection with M. tuberculosis. In patients at high risk for M. tuberculosis infection, a second test should be considered in accordance with the 2017 ATS/IDSA/CDC Clinical Practice Guidelines for Diagnosis of Tuberculosis in Adults and Children. False negative results can be a result of incorrect blood sample collection or handling of the specimen affecting lymphocyte function.

## 2024-10-04 ENCOUNTER — OFFICE VISIT (OUTPATIENT)
Dept: FAMILY MEDICINE CLINIC | Facility: CLINIC | Age: 54
End: 2024-10-04
Payer: COMMERCIAL

## 2024-10-04 VITALS
OXYGEN SATURATION: 98 % | DIASTOLIC BLOOD PRESSURE: 88 MMHG | WEIGHT: 213.8 LBS | BODY MASS INDEX: 39.34 KG/M2 | HEART RATE: 89 BPM | RESPIRATION RATE: 16 BRPM | SYSTOLIC BLOOD PRESSURE: 122 MMHG | TEMPERATURE: 97.3 F | HEIGHT: 62 IN

## 2024-10-04 DIAGNOSIS — J06.9 UPPER RESPIRATORY TRACT INFECTION, UNSPECIFIED TYPE: Primary | ICD-10-CM

## 2024-10-04 DIAGNOSIS — M05.79 RHEUMATOID ARTHRITIS INVOLVING MULTIPLE SITES WITH POSITIVE RHEUMATOID FACTOR (HCC): ICD-10-CM

## 2024-10-04 LAB
SARS-COV-2 AG UPPER RESP QL IA: NEGATIVE
VALID CONTROL: NORMAL

## 2024-10-04 PROCEDURE — 87811 SARS-COV-2 COVID19 W/OPTIC: CPT | Performed by: NURSE PRACTITIONER

## 2024-10-04 PROCEDURE — 99213 OFFICE O/P EST LOW 20 MIN: CPT | Performed by: NURSE PRACTITIONER

## 2024-10-04 RX ORDER — AZITHROMYCIN 250 MG/1
TABLET, FILM COATED ORAL
Qty: 6 TABLET | Refills: 0 | Status: SHIPPED | OUTPATIENT
Start: 2024-10-04 | End: 2024-10-09

## 2024-10-04 RX ORDER — METHYLPREDNISOLONE 4 MG
TABLET, DOSE PACK ORAL
Qty: 21 EACH | Refills: 0 | Status: SHIPPED | OUTPATIENT
Start: 2024-10-04

## 2024-10-04 NOTE — PROGRESS NOTES
"Ambulatory Visit  Name: Alison Hercules      : 1970      MRN: [id number]  Encounter Provider: ELVIRA Le  Encounter Date: 10/4/2024   Encounter department: [facility]    Assessment & Plan  Upper respiratory tract infection, unspecified type  Covid negative.  Suspect most of symptoms are related to irritation from post-nasal drip, likely a viral infection.  She is going to hold her rheumatoid arthritis meds again and recommend guaifenesin, tea with honey, hydration, continue vitamins.  If symptoms seem to worsen, azithromycin is also being called in for her to start on the weekend.  Pt instructed to call for reevaluation if sx worsen or persist.    Orders:    POCT Rapid Covid Ag    azithromycin (Zithromax) 250 mg tablet; Take 2 tablets (500 mg total) by mouth daily for 1 day, THEN 1 tablet (250 mg total) daily for 4 days.    Rheumatoid arthritis involving multiple sites with positive rheumatoid factor (HCC)  Requests medrol dose pack to help with rheumatoid arthritis control since she is going to stop her usual rheumatoid arthritis meds to fight off her uri.  Medrol dose pack prescribed.    Orders:    methylPREDNISolone 4 MG tablet therapy pack; Use as directed on package       History of Present Illness     Pt is a 54 y.o. female who is seen today for evaluation of cold symptoms.  Past medical history of low back pain, rheumatoid arthritis.  She started  with a \"rawness\" in her chest.  She stopped taking her rheumatoid arthritis medications.  Monday at work she felt very fatigued with a cough and sore throat.  She was taking zinc, vit c and was feeling like she was getting better.  She started her rheumatoid arthritis medication again and started with more fatigue, dry cough and her chest feels \"raw\" again.  She has to clear her throat a lot.  Appetite is decreased, she has diarrhea but no n/v.  Her taste buds are diminished as well.   No fever, chills, headache.  She has " "body aches but is not sure if this has to do with adjusting her rheumatoid arthritis meds.  She did not take a covid test.          Review of Systems   Constitutional:  Positive for appetite change and fatigue. Negative for chills and fever.   HENT:  Positive for postnasal drip. Negative for congestion, ear pain, sinus pressure and sore throat.    Respiratory:  Positive for cough and chest tightness (somewhat). Negative for shortness of breath and wheezing.    Cardiovascular:  Negative for chest pain, palpitations and leg swelling.   Gastrointestinal:  Positive for diarrhea. Negative for abdominal pain, nausea and vomiting.   Musculoskeletal:  Negative for myalgias.   Neurological:  Negative for headaches.   Hematological:  Negative for adenopathy.   Psychiatric/Behavioral:  Negative for sleep disturbance.            Objective     /88 (BP Location: Left arm, Patient Position: Sitting, Cuff Size: Standard)   Pulse 89   Temp (!) 97.3 °F (36.3 °C) (Tympanic)   Resp 16   Ht 5' 2\" (1.575 m)   Wt 97 kg (213 lb 12.8 oz)   SpO2 98%   BMI 39.10 kg/m²     Physical Exam  Vitals reviewed.   Constitutional:       General: She is awake. She is not in acute distress.     Appearance: Normal appearance. She is well-developed and well-groomed. She is not ill-appearing.   HENT:      Head: Normocephalic.      Right Ear: Hearing, tympanic membrane, ear canal and external ear normal. No middle ear effusion. Tympanic membrane is not bulging.      Left Ear: Hearing, tympanic membrane, ear canal and external ear normal.  No middle ear effusion. Tympanic membrane is not bulging.      Nose: Mucosal edema and congestion present. No rhinorrhea.      Mouth/Throat:      Lips: Pink.      Mouth: Mucous membranes are moist. Mucous membranes are not dry.      Pharynx: No oropharyngeal exudate (postnasal drainage) or posterior oropharyngeal erythema.      Tonsils: No tonsillar abscesses.   Eyes:      Conjunctiva/sclera: Conjunctivae " normal.   Cardiovascular:      Rate and Rhythm: Normal rate and regular rhythm.      Heart sounds: Normal heart sounds. No murmur heard.  Pulmonary:      Effort: Pulmonary effort is normal. No accessory muscle usage or respiratory distress.      Breath sounds: Normal breath sounds. No decreased breath sounds, wheezing, rhonchi or rales.   Lymphadenopathy:      Head:      Right side of head: No submental, submandibular, tonsillar, preauricular, posterior auricular or occipital adenopathy.      Left side of head: No submental, submandibular, tonsillar, preauricular, posterior auricular or occipital adenopathy.      Cervical: No cervical adenopathy.   Skin:     General: Skin is warm and dry.   Neurological:      Mental Status: She is alert and oriented to person, place, and time.   Psychiatric:         Attention and Perception: Attention normal.         Mood and Affect: Mood normal.         Speech: Speech normal.         Behavior: Behavior normal. Behavior is cooperative.         Thought Content: Thought content normal.         Cognition and Memory: Cognition normal.         Judgment: Judgment normal.

## 2024-10-04 NOTE — ASSESSMENT & PLAN NOTE
Requests medrol dose pack to help with rheumatoid arthritis control since she is going to stop her usual rheumatoid arthritis meds to fight off her uri.  Medrol dose pack prescribed.    Orders:    methylPREDNISolone 4 MG tablet therapy pack; Use as directed on package

## 2024-10-04 NOTE — ASSESSMENT & PLAN NOTE
Covid negative.  Suspect most of symptoms are related to irritation from post-nasal drip, likely a viral infection.  She is going to hold her rheumatoid arthritis meds again and recommend guaifenesin, tea with honey, hydration, continue vitamins.  If symptoms seem to worsen, azithromycin is also being called in for her to start on the weekend.  Pt instructed to call for reevaluation if sx worsen or persist.    Orders:    POCT Rapid Covid Ag    azithromycin (Zithromax) 250 mg tablet; Take 2 tablets (500 mg total) by mouth daily for 1 day, THEN 1 tablet (250 mg total) daily for 4 days.

## 2024-11-20 ENCOUNTER — TELEPHONE (OUTPATIENT)
Age: 54
End: 2024-11-20

## 2024-11-20 NOTE — TELEPHONE ENCOUNTER
PA for Xelanaz SUBMITTED to [email or web address]    via    []CMM-KEY:    [x]Surescripts-Case ID #  [id number]  []Availity-Auth ID #  NDC #    []Faxed to plan   []Other website    []Phone call Case ID #      [x]PA sent as URGENT    All office notes, labs and other pertaining documents and studies sent. Clinical questions answered. Awaiting determination from insurance company.     Turnaround time for your insurance to make a decision on your Prior Authorization can take 7-21 business days.

## 2024-11-20 NOTE — TELEPHONE ENCOUNTER
Patient called in stating that she tried to request a refill on her Xeljanz but she was told that it needed a prior authorization with [employer]. Patient states that she only has a 7 day supply left. Please advise.

## 2024-11-20 NOTE — TELEPHONE ENCOUNTER
PA for xeljanz APPROVED     Date(s) approved November 20, 2024 to November 20, 2025     Case #     Patient advised by          []MyChart Message  []Phone call   []LMOM  []L/M to call office as no active Communication consent on file  []Unable to leave detailed message as VM not approved on Communication consent       Pharmacy advised by    [x]Fax  []Phone call    Approval letter scanned into Media Yes

## 2024-12-05 ENCOUNTER — APPOINTMENT (OUTPATIENT)
Dept: LAB | Facility: CLINIC | Age: 54
End: 2024-12-05
Payer: COMMERCIAL

## 2024-12-05 ENCOUNTER — OFFICE VISIT (OUTPATIENT)
Dept: RHEUMATOLOGY | Facility: CLINIC | Age: 54
End: 2024-12-05
Payer: COMMERCIAL

## 2024-12-05 VITALS
HEART RATE: 97 BPM | TEMPERATURE: 97.8 F | BODY MASS INDEX: 40.12 KG/M2 | OXYGEN SATURATION: 99 % | HEIGHT: 62 IN | SYSTOLIC BLOOD PRESSURE: 130 MMHG | WEIGHT: 218 LBS | DIASTOLIC BLOOD PRESSURE: 80 MMHG

## 2024-12-05 DIAGNOSIS — Z79.899 HIGH RISK MEDICATION USE: ICD-10-CM

## 2024-12-05 DIAGNOSIS — M70.71 BURSITIS OF RIGHT HIP, UNSPECIFIED BURSA: ICD-10-CM

## 2024-12-05 DIAGNOSIS — M17.12 OSTEOARTHRITIS OF LEFT KNEE, UNSPECIFIED OSTEOARTHRITIS TYPE: ICD-10-CM

## 2024-12-05 DIAGNOSIS — Z79.631 METHOTREXATE, LONG TERM, CURRENT USE: ICD-10-CM

## 2024-12-05 DIAGNOSIS — M05.79 RHEUMATOID ARTHRITIS INVOLVING MULTIPLE SITES WITH POSITIVE RHEUMATOID FACTOR (HCC): Primary | ICD-10-CM

## 2024-12-05 DIAGNOSIS — M05.79 RHEUMATOID ARTHRITIS INVOLVING MULTIPLE SITES WITH POSITIVE RHEUMATOID FACTOR (HCC): ICD-10-CM

## 2024-12-05 LAB
ALBUMIN SERPL BCG-MCNC: 4.3 G/DL (ref 3.5–5)
ALP SERPL-CCNC: 65 U/L (ref 34–104)
ALT SERPL W P-5'-P-CCNC: 19 U/L (ref 7–52)
ANION GAP SERPL CALCULATED.3IONS-SCNC: 9 MMOL/L (ref 4–13)
AST SERPL W P-5'-P-CCNC: 17 U/L (ref 13–39)
BASOPHILS # BLD AUTO: 0.05 THOUSANDS/ÂΜL (ref 0–0.1)
BASOPHILS NFR BLD AUTO: 1 % (ref 0–1)
BILIRUB SERPL-MCNC: 0.2 MG/DL (ref 0.2–1)
BUN SERPL-MCNC: 23 MG/DL (ref 5–25)
CALCIUM SERPL-MCNC: 9.3 MG/DL (ref 8.4–10.2)
CHLORIDE SERPL-SCNC: 104 MMOL/L (ref 96–108)
CO2 SERPL-SCNC: 28 MMOL/L (ref 21–32)
CREAT SERPL-MCNC: 0.81 MG/DL (ref 0.6–1.3)
CRP SERPL QL: 4.8 MG/L
EOSINOPHIL # BLD AUTO: 0.21 THOUSAND/ÂΜL (ref 0–0.61)
EOSINOPHIL NFR BLD AUTO: 3 % (ref 0–6)
ERYTHROCYTE [DISTWIDTH] IN BLOOD BY AUTOMATED COUNT: 13.3 % (ref 11.6–15.1)
ERYTHROCYTE [SEDIMENTATION RATE] IN BLOOD: 16 MM/HOUR (ref 0–29)
GFR SERPL CREATININE-BSD FRML MDRD: 82 ML/MIN/1.73SQ M
GLUCOSE SERPL-MCNC: 83 MG/DL (ref 65–140)
HCT VFR BLD AUTO: 38.3 % (ref 34.8–46.1)
HGB BLD-MCNC: 12.5 G/DL (ref 11.5–15.4)
IMM GRANULOCYTES # BLD AUTO: 0.02 THOUSAND/UL (ref 0–0.2)
IMM GRANULOCYTES NFR BLD AUTO: 0 % (ref 0–2)
LYMPHOCYTES # BLD AUTO: 1.58 THOUSANDS/ÂΜL (ref 0.6–4.47)
LYMPHOCYTES NFR BLD AUTO: 19 % (ref 14–44)
MCH RBC QN AUTO: 30.3 PG (ref 26.8–34.3)
MCHC RBC AUTO-ENTMCNC: 32.6 G/DL (ref 31.4–37.4)
MCV RBC AUTO: 93 FL (ref 82–98)
MONOCYTES # BLD AUTO: 0.87 THOUSAND/ÂΜL (ref 0.17–1.22)
MONOCYTES NFR BLD AUTO: 10 % (ref 4–12)
NEUTROPHILS # BLD AUTO: 5.83 THOUSANDS/ÂΜL (ref 1.85–7.62)
NEUTS SEG NFR BLD AUTO: 67 % (ref 43–75)
NRBC BLD AUTO-RTO: 0 /100 WBCS
PLATELET # BLD AUTO: 320 THOUSANDS/UL (ref 149–390)
PMV BLD AUTO: 9.6 FL (ref 8.9–12.7)
POTASSIUM SERPL-SCNC: 4.2 MMOL/L (ref 3.5–5.3)
PROT SERPL-MCNC: 7.4 G/DL (ref 6.4–8.4)
RBC # BLD AUTO: 4.12 MILLION/UL (ref 3.81–5.12)
SODIUM SERPL-SCNC: 141 MMOL/L (ref 135–147)
WBC # BLD AUTO: 8.56 THOUSAND/UL (ref 4.31–10.16)

## 2024-12-05 PROCEDURE — 85652 RBC SED RATE AUTOMATED: CPT

## 2024-12-05 PROCEDURE — 20610 DRAIN/INJ JOINT/BURSA W/O US: CPT | Performed by: INTERNAL MEDICINE

## 2024-12-05 PROCEDURE — 99214 OFFICE O/P EST MOD 30 MIN: CPT | Performed by: INTERNAL MEDICINE

## 2024-12-05 PROCEDURE — 85025 COMPLETE CBC W/AUTO DIFF WBC: CPT

## 2024-12-05 PROCEDURE — 36415 COLL VENOUS BLD VENIPUNCTURE: CPT

## 2024-12-05 PROCEDURE — 80053 COMPREHEN METABOLIC PANEL: CPT

## 2024-12-05 PROCEDURE — 86140 C-REACTIVE PROTEIN: CPT

## 2024-12-05 NOTE — PROGRESS NOTES
Assessment and Plan:   Alison Hercules is a 54 y.o.  female who presents for follow up visit for seropostive rheumatoid arthritis. Currently managed and stable on Tofacitinib Citrate (Xeljanz). Is having some uncontrolled right hip bursitis pain and left knee OA pain. Patient's rheumatologic disease(s) threaten long-term function if not appropriately treated.    Continue ibuprofen 400mg twice a day as needed for joint pain, take with food  Continue famotidine twice a day  Continue folic acid daily  Continue methotrexate 8 tabs once a week  Continue Vit. D daily  Continue hydroxychloroquine twice a day; continue regular eye exams  Continue Xeljanz 11mg daily pill   Do labs now, then every 3 months  Right hip bursa and left knee steroid injections given in clinic today  Do hip bursitis exercises    Return to clinic in 4 months    Plan:  Diagnoses and all orders for this visit:    Rheumatoid arthritis involving multiple sites with positive rheumatoid factor (HCC)  -     Tofacitinib Citrate ER (Xeljanz XR) 11 MG TB24; Take 1 tablet (11 mg total) by mouth daily    Bursitis of right hip, unspecified bursa  -     triamcinolone acetonide (KENALOG-40) 40 mg/mL injection 40 mg  -     lidocaine (XYLOCAINE) 1 % injection 1 mL    Osteoarthritis of left knee, unspecified osteoarthritis type  -     triamcinolone acetonide (KENALOG-40) 40 mg/mL injection 40 mg  -     lidocaine (XYLOCAINE) 1 % injection 1 mL    Methotrexate, long term, current use    High risk medication use     High risk medication use - Benefits and risks of hydroxychloroquine, including but not limited to retinal toxicity, corneal deposits, gastrointestinal side effects, and headaches were previously discussed with the patient. She is aware of the need for a regular eye exam to monitor for ocular toxicity while on this medication.     Benefits and risks of methotrexate use, including but not limited to gastrointestinal disturbances such as diarrhea, hair loss,  "fatigue, stomatitis, leukopenia, lung hypersensitivity reaction, hepatotoxicity, and lymphoma were previously discussed with the patient. Baseline viral hepatitis panel was ordered and returned negative.  CBC,CMP will be regularly monitored. Patient does not plan on having any children. Patient was prescribed Folic Acid 1 mg po daily to take while on methotrexate to help prevent side effects. Patient counseled on abstinence from alcohol while using methotrexate.      Benefits and risks of MISSAEL inhibitor biologic agents like Xeljanz were discussed, and include but are not limited to reactivation of hepatitis B/C or TB, diverticular perforation, hyperlipidemia, hepatotoxicity, VTE, and increased risk of infections. A baseline viral hepatitis panel and TB test was checked. CBC/CMP and lipid panel will be monitored regularly.    Large joint arthrocentesis: R greater trochanteric bursa  Universal Protocol:  procedure performed by consultantConsent: Verbal consent obtained. Written consent not obtained.  Risks and benefits: risks, benefits and alternatives were discussed  Consent given by: patient  Time out: Immediately prior to procedure a \"time out\" was called to verify the correct patient, procedure, equipment, support staff and site/side marked as required.  Timeout called at: 12/5/2024 2:30 PM.  Patient understanding: patient states understanding of the procedure being performed  Patient consent: the patient's understanding of the procedure matches consent given  Procedure consent: procedure consent matches procedure scheduled  Relevant documents: relevant documents present and verified  Test results: test results available and properly labeled  Site marked: the operative site was marked  Radiology Images displayed and confirmed. If images not available, report reviewed: imaging studies available  Required items: required blood products, implants, devices, and special equipment available  Patient identity confirmed: " "verbally with patient  Supporting Documentation  Indications: pain   Procedure Details  Location: hip - R greater trochanteric bursa  Preparation: Patient was prepped and draped in the usual sterile fashion  Needle size: 25 G  Ultrasound guidance: no  Approach: lateral    Patient tolerance: patient tolerated the procedure well with no immediate complications  Dressing:  Sterile dressing applied    After discussing the risks/benefits of right trochanteric bursa steroid injection, including minor risk of infection, bleeding, pain, or ineffectiveness, informed consent was obtained and patient was agreeable to proceed.  Using sterile technique, the right hip bursa area was prepped with Chloraprep, and was topically anesthetized with Ethyl Chloride.  The bursa was entered using a lateral approach and Kenalog 40 mg and 1 mL lidocaine 1% were then injected and the needle withdrawn.  The procedure was well tolerated.  Patient was instructed to contact our office with any concerns or questions.         Large joint arthrocentesis: L knee  Universal Protocol:  procedure performed by consultantConsent: Verbal consent obtained. Written consent not obtained.  Risks and benefits: risks, benefits and alternatives were discussed  Consent given by: patient  Time out: Immediately prior to procedure a \"time out\" was called to verify the correct patient, procedure, equipment, support staff and site/side marked as required.  Timeout called at: 12/5/2024 2:30 PM.  Patient understanding: patient states understanding of the procedure being performed  Patient consent: the patient's understanding of the procedure matches consent given  Procedure consent: procedure consent matches procedure scheduled  Relevant documents: relevant documents present and verified  Test results: test results available and properly labeled  Site marked: the operative site was marked  Radiology Images displayed and confirmed. If images not available, report " reviewed: imaging studies available  Required items: required blood products, implants, devices, and special equipment available  Patient identity confirmed: verbally with patient  Supporting Documentation  Indications: pain   Procedure Details  Location: knee - L knee  Preparation: Patient was prepped and draped in the usual sterile fashion  Needle size: 25 G  Ultrasound guidance: no  Approach: anterolateral    Patient tolerance: patient tolerated the procedure well with no immediate complications  Dressing:  Sterile dressing applied    After discussing the risks/benefits of left knee steroid injection, including minor risk of infection, bleeding, pain, or ineffectiveness, informed consent was obtained and patient was agreeable to proceed.  Using sterile technique, the left knee was prepped with Chloraprep, and was topically anesthetized with Ethyl Chloride.  The joint was entered using an anterolateral approach and Kenalog 40 mg and 1 mL lidocaine 1% were then injected and the needle withdrawn.  The procedure was well tolerated.  Patient was instructed to contact our office with any concerns or questions.         Follow-up plan: Return to clinic in 4 months          Rheumatic Disease Summary    11/16/23: Patient presents for follow-up of seropositive rheumatoid arthritis, which has come under much better control since starting Rinvoq few months ago.  She admits to half an hour to 40 minutes of morning stiffness.  Her left knee will swell up at times, has OA in it; steroid injections and hyaluronic acid injections haven't helped; next step is surgery..  Continue ibuprofen 800mg 2 times a day as needed for joint pain  Continue famotidine twice a day  Continue folic acid daily  Continue methotrexate 8 tabs once a week  Continue Vit. D daily  Continue hydroxychloroquine twice a day; continue regular eye exams  Continue daily Rinvoq 15mg po   Do labs before next visit    04/04/2024 Pt presents for follow-up of her  seropostive rheumatoid arthritis, which has become uncontrolled again and is progressing.  She was initially doing well on Rinvoq, but it has been losing efficacy, and it caused her to develop acne.  She admits to 30 minutes of morning stiffness.  She may be a good candidate for Xeljanz 11 mg daily instead.  Will work on prior authorization. Patient's rheumatologic disease(s) threaten long-term function if not appropriately treated. Left knee OA is still an issue; seeing Orthopedics.    Continue ibuprofen 800mg 3 times a day as needed for joint pain, take with food  Continue famotidine twice a day  Continue folic acid daily  Continue methotrexate 8 tabs once a week  Continue Vit. D daily  Continue hydroxychloroquine twice a day; continue regular eye exams  Will work on Xeljanz 11mg daily pill prior auth  Do labs now, then again before next visit     8/8/24:  Currently managed and stable on Tofacitinib Citrate (Xeljanz). Her RA symptoms are worse in the rain and winter. She does have morning stiffness upto 2 hours. Patient's rheumatologic disease(s) threaten long-term function if not appropriately treated.  She endorses right hip pain and its ongoing since 3 months. She describes it as a dull ache during the day while walking and standing at work. More noticeable and painful at night, requiring her to reposition. Relieved by laying on her back or on her stomach. She takes ibuprofen 800 mg at 6pm and goes to bed at 8pm but still feels the pain.   Likely differential diagnosis right hip bursitis. We offered steroid injection but patient refused.   Continue ibuprofen 800mg 3 times a day as needed for joint pain, take with food  Continue famotidine twice a day  Continue folic acid daily  Continue methotrexate 8 tabs once a week  Continue Vit. D daily  Continue hydroxychloroquine twice a day; continue regular eye exams  Continue Xeljanz 11mg daily pill   Do labs now, then every 3 months  Do hip bursitis  exercises      Chief Complaint  No chief complaint on file.      AC Hercules is a 54 y.o.  female who presents for follow up of rheumatoid arthritis which was diagnosed in 2020. Overall feels stable on Xeljanz; right hip bursa and left knee has been bothering her, especially since she has been so busy at work.    The following portions of the patient's history were reviewed and updated as appropriate: allergies, current medications, past family history, past medical history, past social history, past surgical history and problem list.    Review of Systems:   Review of Systems   Constitutional: Negative.    HENT: Negative.     Eyes: Negative.    Respiratory: Negative.     Cardiovascular: Negative.    Gastrointestinal: Negative.    Endocrine: Negative.    Genitourinary: Negative.    Musculoskeletal:  Positive for arthralgias and myalgias.        Leg swelling  R Hip pain   Skin: Negative.    Allergic/Immunologic: Negative.    Neurological: Negative.    Hematological: Negative.    Psychiatric/Behavioral: Negative.       Reviewed and agree.    Home Medications:    Current Outpatient Medications:     Ascorbic Acid (vitamin C) 1000 MG tablet, Take 1,000 mg by mouth daily, Disp: , Rfl:     cholecalciferol (VITAMIN D3) 1,000 units tablet, Take 2,000 Units by mouth, Disp: , Rfl:     COLLAGEN PO, Take by mouth, Disp: , Rfl:     famotidine (PEPCID) 20 mg tablet, Take 1 tablet (20 mg total) by mouth 2 (two) times a day, Disp: 180 tablet, Rfl: 3    folic acid (FOLVITE) 1 mg tablet, Take 1 tablet (1 mg total) by mouth daily, Disp: 90 tablet, Rfl: 3    furosemide (LASIX) 20 mg tablet, Take 1 tablet (20 mg total) by mouth 2 (two) times a day, Disp: 60 tablet, Rfl: 2    hydroxychloroquine (PLAQUENIL) 200 mg tablet, Take 1 tablet (200 mg total) by mouth 2 (two) times a day, Disp: 180 tablet, Rfl: 3    ibuprofen (MOTRIN) 800 mg tablet, Take 1 tablet (800 mg total) by mouth 2 (two) times a day, Disp: 180 tablet, Rfl: 3    magnesium  "gluconate (MAGONATE) 500 mg tablet, Take 500 mg by mouth daily, Disp: , Rfl:     methotrexate 2.5 MG tablet, TAKE EIGHT TABLETS BY MOUTH ONCE A WEEK. take all 8 tablets on the same day every week, Disp: 96 tablet, Rfl: 3    methylPREDNISolone 4 MG tablet therapy pack, Use as directed on package, Disp: 21 each, Rfl: 0    Milk Thistle 250 MG CAPS, Take by mouth, Disp: , Rfl:     multivitamin (THERAGRAN) TABS, Take 1 tablet by mouth daily, Disp: , Rfl:     pyridoxine (B-6) 100 MG tablet, Take 100 mg by mouth, Disp: , Rfl:     Tofacitinib Citrate ER (Xeljanz XR) 11 MG TB24, Take 1 tablet (11 mg total) by mouth daily, Disp: 30 tablet, Rfl: 11    Turmeric, Curcuma Longa, (Curcumin Extract) POWD, Use Take 700 mg -  Herbal supplement, Disp: , Rfl:     vitamin B-12 (VITAMIN B-12) 1,000 mcg tablet, Take by mouth daily, Disp: , Rfl:     zinc gluconate 50 mg tablet, Take 50 mg by mouth daily, Disp: , Rfl:     Current Facility-Administered Medications:     lidocaine (XYLOCAINE) 1 % injection 1 mL, 1 mL, Injection, Once,     lidocaine (XYLOCAINE) 1 % injection 1 mL, 1 mL, Injection, Once,     triamcinolone acetonide (KENALOG-40) 40 mg/mL injection 40 mg, 40 mg, Intra-lesional, Once,     triamcinolone acetonide (KENALOG-40) 40 mg/mL injection 40 mg, 40 mg, Intra-articular, Once,     Objective:    Vitals:    12/05/24 1414   BP: 130/80   Pulse: 97   Temp: 97.8 °F (36.6 °C)   SpO2: 99%   Weight: 98.9 kg (218 lb)   Height: 5' 2\" (1.575 m)       Physical Exam  Constitutional:       General: She is not in acute distress.     Appearance: Normal appearance.   HENT:      Nose: Nose normal.      Mouth/Throat:      Mouth: Mucous membranes are moist.      Pharynx: Oropharynx is clear.   Eyes:      Conjunctiva/sclera: Conjunctivae normal.      Pupils: Pupils are equal, round, and reactive to light.   Cardiovascular:      Rate and Rhythm: Normal rate and regular rhythm.      Heart sounds: Normal heart sounds.   Pulmonary:      Effort: No " respiratory distress.      Breath sounds: Normal breath sounds. No wheezing.   Abdominal:      Palpations: Abdomen is soft.      Tenderness: There is no abdominal tenderness.   Musculoskeletal:         General: Tenderness (Right lateral hip) present. No swelling.      Cervical back: No rigidity.      Comments: Left knee tenderness   Skin:     General: Skin is warm and dry.   Neurological:      Mental Status: She is alert and oriented to person, place, and time.   Psychiatric:         Mood and Affect: Mood normal.         Behavior: Behavior normal.       Reviewed labs and imaging.    Imaging:   No results found.    Labs:   Appointment on 12/05/2024   Component Date Value Ref Range Status    WBC 12/05/2024 8.56  4.31 - 10.16 Thousand/uL Final    RBC 12/05/2024 4.12  3.81 - 5.12 Million/uL Final    Hemoglobin 12/05/2024 12.5  11.5 - 15.4 g/dL Final    Hematocrit 12/05/2024 38.3  34.8 - 46.1 % Final    MCV 12/05/2024 93  82 - 98 fL Final    MCH 12/05/2024 30.3  26.8 - 34.3 pg Final    MCHC 12/05/2024 32.6  31.4 - 37.4 g/dL Final    RDW 12/05/2024 13.3  11.6 - 15.1 % Final    MPV 12/05/2024 9.6  8.9 - 12.7 fL Final    Platelets 12/05/2024 320  149 - 390 Thousands/uL Final    nRBC 12/05/2024 0  /100 WBCs Final    Segmented % 12/05/2024 67  43 - 75 % Final    Immature Grans % 12/05/2024 0  0 - 2 % Final    Lymphocytes % 12/05/2024 19  14 - 44 % Final    Monocytes % 12/05/2024 10  4 - 12 % Final    Eosinophils Relative 12/05/2024 3  0 - 6 % Final    Basophils Relative 12/05/2024 1  0 - 1 % Final    Absolute Neutrophils 12/05/2024 5.83  1.85 - 7.62 Thousands/µL Final    Absolute Immature Grans 12/05/2024 0.02  0.00 - 0.20 Thousand/uL Final    Absolute Lymphocytes 12/05/2024 1.58  0.60 - 4.47 Thousands/µL Final    Absolute Monocytes 12/05/2024 0.87  0.17 - 1.22 Thousand/µL Final    Eosinophils Absolute 12/05/2024 0.21  0.00 - 0.61 Thousand/µL Final    Basophils Absolute 12/05/2024 0.05  0.00 - 0.10 Thousands/µL Final     Sodium 12/05/2024 141  135 - 147 mmol/L Final    Potassium 12/05/2024 4.2  3.5 - 5.3 mmol/L Final    Chloride 12/05/2024 104  96 - 108 mmol/L Final    CO2 12/05/2024 28  21 - 32 mmol/L Final    ANION GAP 12/05/2024 9  4 - 13 mmol/L Final    BUN 12/05/2024 23  5 - 25 mg/dL Final    Creatinine 12/05/2024 0.81  0.60 - 1.30 mg/dL Final    Standardized to IDMS reference method    Glucose 12/05/2024 83  65 - 140 mg/dL Final    If the patient is fasting, the ADA then defines impaired fasting glucose as > 100 mg/dL and diabetes as > or equal to 123 mg/dL.    Calcium 12/05/2024 9.3  8.4 - 10.2 mg/dL Final    AST 12/05/2024 17  13 - 39 U/L Final    ALT 12/05/2024 19  7 - 52 U/L Final    Specimen collection should occur prior to Sulfasalazine administration due to the potential for falsely depressed results.     Alkaline Phosphatase 12/05/2024 65  34 - 104 U/L Final    Total Protein 12/05/2024 7.4  6.4 - 8.4 g/dL Final    Albumin 12/05/2024 4.3  3.5 - 5.0 g/dL Final    Total Bilirubin 12/05/2024 0.20  0.20 - 1.00 mg/dL Final    Use of this assay is not recommended for patients undergoing treatment with eltrombopag due to the potential for falsely elevated results.  N-acetyl-p-benzoquinone imine (metabolite of Acetaminophen) will generate erroneously low results in samples for patients that have taken an overdose of Acetaminophen.    eGFR 12/05/2024 82  ml/min/1.73sq m Final    CRP 12/05/2024 4.8 (H)  <3.0 mg/L Final    Sed Rate 12/05/2024 16  0 - 29 mm/hour Final   Office Visit on 10/04/2024   Component Date Value Ref Range Status    POCT SARS-CoV-2 Ag 10/04/2024 Negative  Negative Final    VALID CONTROL 10/04/2024 Valid   Final   Appointment on 08/08/2024   Component Date Value Ref Range Status    WBC 08/08/2024 9.02  4.31 - 10.16 Thousand/uL Final    RBC 08/08/2024 4.05  3.81 - 5.12 Million/uL Final    Hemoglobin 08/08/2024 12.3  11.5 - 15.4 g/dL Final    Hematocrit 08/08/2024 37.3  34.8 - 46.1 % Final    MCV 08/08/2024 92   82 - 98 fL Final    MCH 08/08/2024 30.4  26.8 - 34.3 pg Final    MCHC 08/08/2024 33.0  31.4 - 37.4 g/dL Final    RDW 08/08/2024 14.3  11.6 - 15.1 % Final    MPV 08/08/2024 9.5  8.9 - 12.7 fL Final    Platelets 08/08/2024 350  149 - 390 Thousands/uL Final    nRBC 08/08/2024 0  /100 WBCs Final    Segmented % 08/08/2024 69  43 - 75 % Final    Immature Grans % 08/08/2024 0  0 - 2 % Final    Lymphocytes % 08/08/2024 20  14 - 44 % Final    Monocytes % 08/08/2024 9  4 - 12 % Final    Eosinophils Relative 08/08/2024 1  0 - 6 % Final    Basophils Relative 08/08/2024 1  0 - 1 % Final    Absolute Neutrophils 08/08/2024 6.12  1.85 - 7.62 Thousands/µL Final    Absolute Immature Grans 08/08/2024 0.02  0.00 - 0.20 Thousand/uL Final    Absolute Lymphocytes 08/08/2024 1.84  0.60 - 4.47 Thousands/µL Final    Absolute Monocytes 08/08/2024 0.85  0.17 - 1.22 Thousand/µL Final    Eosinophils Absolute 08/08/2024 0.13  0.00 - 0.61 Thousand/µL Final    Basophils Absolute 08/08/2024 0.06  0.00 - 0.10 Thousands/µL Final    Sodium 08/08/2024 139  135 - 147 mmol/L Final    Potassium 08/08/2024 4.4  3.5 - 5.3 mmol/L Final    Chloride 08/08/2024 104  96 - 108 mmol/L Final    CO2 08/08/2024 28  21 - 32 mmol/L Final    ANION GAP 08/08/2024 7  4 - 13 mmol/L Final    BUN 08/08/2024 28 (H)  5 - 25 mg/dL Final    Creatinine 08/08/2024 1.12  0.60 - 1.30 mg/dL Final    Standardized to IDMS reference method    Glucose 08/08/2024 88  65 - 140 mg/dL Final    If the patient is fasting, the ADA then defines impaired fasting glucose as > 100 mg/dL and diabetes as > or equal to 123 mg/dL.    Calcium 08/08/2024 9.7  8.4 - 10.2 mg/dL Final    AST 08/08/2024 18  13 - 39 U/L Final    ALT 08/08/2024 16  7 - 52 U/L Final    Specimen collection should occur prior to Sulfasalazine administration due to the potential for falsely depressed results.     Alkaline Phosphatase 08/08/2024 59  34 - 104 U/L Final    Total Protein 08/08/2024 7.7  6.4 - 8.4 g/dL Final    Albumin  08/08/2024 4.5  3.5 - 5.0 g/dL Final    Total Bilirubin 08/08/2024 0.19 (L)  0.20 - 1.00 mg/dL Final    Use of this assay is not recommended for patients undergoing treatment with eltrombopag due to the potential for falsely elevated results.  N-acetyl-p-benzoquinone imine (metabolite of Acetaminophen) will generate erroneously low results in samples for patients that have taken an overdose of Acetaminophen.    eGFR 08/08/2024 55  ml/min/1.73sq m Final    CRP 08/08/2024 3.0 (H)  <3.0 mg/L Final    Sed Rate 08/08/2024 15  0 - 29 mm/hour Final   Office Visit on 06/20/2024   Component Date Value Ref Range Status     RAPID STREP A 06/20/2024 Negative  Negative Final    POCT SARS-CoV-2 Ag 06/20/2024 Negative  Negative Final    VALID CONTROL 06/20/2024 Valid   Final

## 2024-12-05 NOTE — PATIENT INSTRUCTIONS
Continue ibuprofen 400mg twice a day as needed for joint pain, take with food  Continue famotidine twice a day  Continue folic acid daily  Continue methotrexate 8 tabs once a week  Continue Vit. D daily  Continue hydroxychloroquine twice a day; continue regular eye exams  Continue Xeljanz 11mg daily pill   Do labs now, then every 3 months  Right hip bursa and left knee injections given in clinic today  Do hip bursitis exercises    Return to clinic in 4 months

## 2024-12-06 RX ORDER — LIDOCAINE HYDROCHLORIDE 10 MG/ML
1 INJECTION, SOLUTION INFILTRATION; PERINEURAL ONCE
Status: SHIPPED | OUTPATIENT
Start: 2024-12-06

## 2024-12-06 RX ORDER — TRIAMCINOLONE ACETONIDE 40 MG/ML
40 INJECTION, SUSPENSION INTRA-ARTICULAR; INTRAMUSCULAR ONCE
Status: SHIPPED | OUTPATIENT
Start: 2024-12-06

## 2024-12-06 RX ORDER — TOFACITINIB 11 MG/1
11 TABLET, FILM COATED, EXTENDED RELEASE ORAL DAILY
Qty: 30 TABLET | Refills: 11 | Status: SHIPPED | OUTPATIENT
Start: 2024-12-06

## 2025-01-27 ENCOUNTER — OFFICE VISIT (OUTPATIENT)
Dept: URGENT CARE | Facility: CLINIC | Age: 55
End: 2025-01-27
Payer: COMMERCIAL

## 2025-01-27 VITALS
SYSTOLIC BLOOD PRESSURE: 118 MMHG | OXYGEN SATURATION: 96 % | TEMPERATURE: 97.6 F | HEART RATE: 92 BPM | DIASTOLIC BLOOD PRESSURE: 63 MMHG | RESPIRATION RATE: 20 BRPM

## 2025-01-27 DIAGNOSIS — J06.9 ACUTE URI: Primary | ICD-10-CM

## 2025-01-27 PROCEDURE — S9083 URGENT CARE CENTER GLOBAL: HCPCS | Performed by: NURSE PRACTITIONER

## 2025-01-27 PROCEDURE — G0382 LEV 3 HOSP TYPE B ED VISIT: HCPCS | Performed by: NURSE PRACTITIONER

## 2025-01-27 RX ORDER — BROMPHENIRAMINE MALEATE, PSEUDOEPHEDRINE HYDROCHLORIDE, AND DEXTROMETHORPHAN HYDROBROMIDE 2; 30; 10 MG/5ML; MG/5ML; MG/5ML
5 SYRUP ORAL 4 TIMES DAILY PRN
Qty: 120 ML | Refills: 0 | Status: SHIPPED | OUTPATIENT
Start: 2025-01-27

## 2025-01-27 RX ORDER — PREDNISONE 10 MG/1
10 TABLET ORAL DAILY
Qty: 21 TABLET | Refills: 0 | Status: SHIPPED | OUTPATIENT
Start: 2025-01-27

## 2025-01-27 NOTE — LETTER
January 27, 2025     Patient: Alison Hercules   YOB: 1970   Date of Visit: 1/27/2025       To Whom it May Concern:    Alison Hercules was seen in my clinic on 1/27/2025. She may return to work on 1/28/2025 .    If you have any questions or concerns, please don't hesitate to call.         Sincerely,          ELVIRA Jose        CC: No Recipients

## 2025-01-27 NOTE — PATIENT INSTRUCTIONS
"Bromfed as directed  Flonase 1 spray each nostril daily.  Saline nasal spray as directed to rinse sinus passages.  Increase your fluid intake.  Tylenol and/or Motrin as needed for pain or fever.  Follow up with your PCP for worsening or concerning symptoms    Patient Education     Upper respiratory infection in adults - Discharge instructions   The Basics   Written by the doctors and editors at [facility]   What are discharge instructions? -- Discharge instructions are information about how to take care of yourself after getting medical care for a health problem.  What is an upper respiratory infection? -- An upper respiratory infection (\"URI\") is an illness that can affect your nose, throat, ears, and sinuses. Almost all URIs are caused by a virus. The common cold is an example of a viral URI. Some URIs are caused by bacteria, but this is much less common.  URIs spread easily from person to person, most often through coughing or sneezing. A URI will almost always get better in a week or 2 without any treatment. Because most URIs are caused by viruses, antibiotics do not usually help.  If you do have a bacterial infection, your doctor might prescribe antibiotics.  How do I care for myself at home? -- Ask the doctor or nurse what you should do when you go home. Make sure that you understand exactly what you need to do to care for yourself. Ask questions if there is anything you do not understand.  You should also:   Wash your hands often (figure 1), and cough or sneeze into a tissue. If you do not have a tissue, cough or sneeze into your elbow instead of your hands.   Drink lots of fluids (water, juice, or broth) to stay hydrated, unless your doctor told you otherwise. This will help replace any fluids lost through runny nose or fever. Warm tea or soup can also help soothe a sore throat.   To help a stuffy nose and make it easier to breathe:   Use saline nose drops or spray.   Use a humidifier if the air in your home " feels dry.   Follow the directions on the label carefully if you take over-the-counter cough or cold medicines. Do not take more than 1 medicine that contains acetaminophen. Also, if you have a heart problem or high blood pressure, check with your doctor before you take any of these medicines.   Try to quit smoking if you smoke. Your doctor or nurse can help.  How can I prevent getting another URI? -- The best way to prevent a URI, or keep it from spreading to others, is to keep your hands clean. Wash your hands often with soap and water or alcohol gel rubs.  Some other ways to prevent the spread of infection include:   Always wash your hands with soap and water after you cough, sneeze, or blow your nose.   Clean surfaces and objects that you touch a lot. These include sinks, counters, tables, door handles, remotes, and phones. Use a bleach and water mixture. The germs that cause a URI can live on surfaces for at least 2 hours.   Do not share cups, food, towels, bed linens, or other personal items.   Stay away from other people when you are sick. When you do need to be around other people, consider wearing a face mask.  When should I call the doctor? -- Call for advice if:   You have a persistent fever of 100.4°F (38°C) or higher, chills, a very bad sore throat, or ear or sinus pain.   You get a new fever after several days of feeling the same or getting better.   You start having chest pain when you cough.   You have a cough that lasts more than 10 days.   You cough up blood.   You have any new or worsening symptoms, such as worsening cough or trouble breathing.  All topics are updated as new evidence becomes available and our peer review process is complete.  This topic retrieved from [employer] on: Mar 13, 2024.  Topic 206016 Version 1.0  Release: 32.2.4 - C32.71  © 2024 UpToDate, Inc. and/or its affiliates. All rights reserved.  figure 1: How to wash your hands     Wet your hands with clean water, and apply a  small amount of soap. Lather and rub hands together for at least 20 seconds. Clean your wrists, palms, backs of your hands, between your fingers, tips of your fingers, thumbs, and under and around your nails. Rinse well, and dry your hands using a clean towel.  Graphic 276999 Version 7.0  Consumer Information Use and Disclaimer   Disclaimer: This generalized information is a limited summary of diagnosis, treatment, and/or medication information. It is not meant to be comprehensive and should be used as a tool to help the user understand and/or assess potential diagnostic and treatment options. It does NOT include all information about conditions, treatments, medications, side effects, or risks that may apply to a specific patient. It is not intended to be medical advice or a substitute for the medical advice, diagnosis, or treatment of a health care provider based on the health care provider's examination and assessment of a patient's specific and unique circumstances. Patients must speak with a health care provider for complete information about their health, medical questions, and treatment options, including any risks or benefits regarding use of medications. This information does not endorse any treatments or medications as safe, effective, or approved for treating a specific patient. UpToDate, Inc. and its affiliates disclaim any warranty or liability relating to this information or the use thereof.The use of this information is governed by the Terms of Use, available at https://www.[employer].com/en/know/clinical-effectiveness-terms. 2024© UpToDate, Inc. and its affiliates and/or licensors. All rights reserved.  Copyright   © 2024 UpToDate, Inc. and/or its affiliates. All rights reserved.

## 2025-01-27 NOTE — PROGRESS NOTES
[facility]        NAME: Alison Hercules is a 55 y.o. female  : 1970    MRN: [id number]  DATE: 2025  TIME: 12:59 PM    Assessment and Plan   Acute URI [J06.9]  1. Acute URI  brompheniramine-pseudoephedrine-DM 30-2-10 MG/5ML syrup    predniSONE 10 mg tablet            Patient Instructions       Follow up with PCP in 3-5 days.  Proceed to  ER if symptoms worsen.    Chief Complaint     Chief Complaint   Patient presents with    Flu Symptoms     Started 3 days ago with fever 101.4, chills, body aches, fatigue, cough, congestion         History of Present Illness       HPI    Review of Systems   Review of Systems      Current Medications       Current Outpatient Medications:     Ascorbic Acid (vitamin C) 1000 MG tablet, Take 1,000 mg by mouth daily, Disp: , Rfl:     brompheniramine-pseudoephedrine-DM 30-2-10 MG/5ML syrup, Take 5 mL by mouth 4 (four) times a day as needed for congestion or cough, Disp: 120 mL, Rfl: 0    cholecalciferol (VITAMIN D3) 1,000 units tablet, Take 2,000 Units by mouth, Disp: , Rfl:     COLLAGEN PO, Take by mouth, Disp: , Rfl:     famotidine (PEPCID) 20 mg tablet, Take 1 tablet (20 mg total) by mouth 2 (two) times a day, Disp: 180 tablet, Rfl: 3    folic acid (FOLVITE) 1 mg tablet, Take 1 tablet (1 mg total) by mouth daily, Disp: 90 tablet, Rfl: 3    hydroxychloroquine (PLAQUENIL) 200 mg tablet, Take 1 tablet (200 mg total) by mouth 2 (two) times a day, Disp: 180 tablet, Rfl: 3    ibuprofen (MOTRIN) 800 mg tablet, Take 1 tablet (800 mg total) by mouth 2 (two) times a day, Disp: 180 tablet, Rfl: 3    magnesium gluconate (MAGONATE) 500 mg tablet, Take 500 mg by mouth daily, Disp: , Rfl:     methotrexate 2.5 MG tablet, TAKE EIGHT TABLETS BY MOUTH ONCE A WEEK. take all 8 tablets on the same day every week, Disp: 96 tablet, Rfl: 3    Milk Thistle 250 MG CAPS, Take by mouth, Disp: , Rfl:     predniSONE 10 mg tablet, Take 1 tablet (10 mg total) by mouth daily 60mg days 1, 50mg  day 2, 40mg day 3, 30mg day 4, 20 mg day 5, 10mg day 6., Disp: 21 tablet, Rfl: 0    pyridoxine (B-6) 100 MG tablet, Take 100 mg by mouth, Disp: , Rfl:     Tofacitinib Citrate ER (Xeljanz XR) 11 MG TB24, Take 1 tablet (11 mg total) by mouth daily, Disp: 30 tablet, Rfl: 11    Turmeric, Curcuma Longa, (Curcumin Extract) POWD, Use Take 700 mg -  Herbal supplement, Disp: , Rfl:     vitamin B-12 (VITAMIN B-12) 1,000 mcg tablet, Take by mouth daily, Disp: , Rfl:     zinc gluconate 50 mg tablet, Take 50 mg by mouth daily, Disp: , Rfl:     furosemide (LASIX) 20 mg tablet, Take 1 tablet (20 mg total) by mouth 2 (two) times a day, Disp: 60 tablet, Rfl: 2    methylPREDNISolone 4 MG tablet therapy pack, Use as directed on package (Patient not taking: Reported on 1/27/2025), Disp: 21 each, Rfl: 0    multivitamin (THERAGRAN) TABS, Take 1 tablet by mouth daily (Patient not taking: Reported on 1/27/2025), Disp: , Rfl:     Current Facility-Administered Medications:     lidocaine (XYLOCAINE) 1 % injection 1 mL, 1 mL, Injection, Once,     lidocaine (XYLOCAINE) 1 % injection 1 mL, 1 mL, Injection, Once,     triamcinolone acetonide (KENALOG-40) 40 mg/mL injection 40 mg, 40 mg, Intra-lesional, Once,     triamcinolone acetonide (KENALOG-40) 40 mg/mL injection 40 mg, 40 mg, Intra-articular, Once,     Current Allergies     Allergies as of 01/27/2025 - Reviewed 01/27/2025   Allergen Reaction Noted    Clarithromycin GI Intolerance 09/17/2015    Seasonal ic [cholestatin] Other (See Comments) 10/13/2021            The following portions of the patient's history were reviewed and updated as appropriate: allergies, current medications, past family history, past medical history, past social history, past surgical history and problem list.     Past Medical History:   Diagnosis Date    Allergic     Seasonal    Anxiety     Depression     Knee pain     Pinched nerve     L5, L2    Rheumatoid arthritis (HCC)        Past Surgical History:   Procedure  Laterality Date    ROTATOR CUFF REPAIR         Family History   Problem Relation Age of Onset    Arthritis Mother     COPD Father          Medications have been verified.        Objective   /63   Pulse 92   Temp 97.6 °F (36.4 °C) (Temporal)   Resp 20   SpO2 96%        Physical Exam     Physical Exam

## 2025-01-27 NOTE — PROGRESS NOTES
[facility]        NAME: Alison Hercules is a 55 y.o. female  : 1970    MRN: [id number]  DATE: 2025  TIME: 1:00 PM    Assessment and Plan   Acute URI [J06.9]  1. Acute URI  brompheniramine-pseudoephedrine-DM 30-2-10 MG/5ML syrup    predniSONE 10 mg tablet        Patient with a history of RA currently off immunosuppressants since becoming ill. She typically requires steroid taper due to flare when off medications.     Patient Instructions     Bromfed as directed  Flonase 1 spray each nostril daily.  Saline nasal spray as directed to rinse sinus passages.  Increase your fluid intake.  Tylenol and/or Motrin as needed for pain or fever.  Follow up with your PCP for worsening or concerning symptoms    Follow up with PCP in 3-5 days.  Proceed to  ER if symptoms worsen.    Chief Complaint     Chief Complaint   Patient presents with    Flu Symptoms     Started 3 days ago with fever 101.4, chills, body aches, fatigue, cough, congestion         History of Present Illness       Patient is a 55 year old female presenting with 3 days of fever with tmax of 101.4, dry hacking cough and nasal congestion. She has been managing symptoms with zinc, vitamin C, and combination mucinex. She has RA and takes ibuprofen daily, she stopped her immunosuppressants when she became sick. She works at a [employer] and has sick contacts with +Covid. She denies ear pain, throat pain, or shortness of breath.     Flu Symptoms  Associated symptoms include congestion, fatigue and a fever. Pertinent negatives include no ear discharge, ear pain, sore throat, chest pain, shortness of breath, wheezing, diarrhea, nausea or vomiting.       Review of Systems   Review of Systems   Constitutional:  Positive for chills, fatigue and fever.   HENT:  Positive for congestion. Negative for ear discharge, ear pain, sinus pressure, sinus pain and sore throat.    Respiratory:  Negative for shortness of breath and wheezing.    Cardiovascular:   Negative for chest pain.   Gastrointestinal:  Negative for diarrhea, nausea and vomiting.   Musculoskeletal:  Positive for myalgias.         Current Medications       Current Outpatient Medications:     Ascorbic Acid (vitamin C) 1000 MG tablet, Take 1,000 mg by mouth daily, Disp: , Rfl:     brompheniramine-pseudoephedrine-DM 30-2-10 MG/5ML syrup, Take 5 mL by mouth 4 (four) times a day as needed for congestion or cough, Disp: 120 mL, Rfl: 0    cholecalciferol (VITAMIN D3) 1,000 units tablet, Take 2,000 Units by mouth, Disp: , Rfl:     COLLAGEN PO, Take by mouth, Disp: , Rfl:     famotidine (PEPCID) 20 mg tablet, Take 1 tablet (20 mg total) by mouth 2 (two) times a day, Disp: 180 tablet, Rfl: 3    folic acid (FOLVITE) 1 mg tablet, Take 1 tablet (1 mg total) by mouth daily, Disp: 90 tablet, Rfl: 3    hydroxychloroquine (PLAQUENIL) 200 mg tablet, Take 1 tablet (200 mg total) by mouth 2 (two) times a day, Disp: 180 tablet, Rfl: 3    ibuprofen (MOTRIN) 800 mg tablet, Take 1 tablet (800 mg total) by mouth 2 (two) times a day, Disp: 180 tablet, Rfl: 3    magnesium gluconate (MAGONATE) 500 mg tablet, Take 500 mg by mouth daily, Disp: , Rfl:     methotrexate 2.5 MG tablet, TAKE EIGHT TABLETS BY MOUTH ONCE A WEEK. take all 8 tablets on the same day every week, Disp: 96 tablet, Rfl: 3    Milk Thistle 250 MG CAPS, Take by mouth, Disp: , Rfl:     predniSONE 10 mg tablet, Take 1 tablet (10 mg total) by mouth daily 60mg days 1, 50mg day 2, 40mg day 3, 30mg day 4, 20 mg day 5, 10mg day 6., Disp: 21 tablet, Rfl: 0    pyridoxine (B-6) 100 MG tablet, Take 100 mg by mouth, Disp: , Rfl:     Tofacitinib Citrate ER (Xeljanz XR) 11 MG TB24, Take 1 tablet (11 mg total) by mouth daily, Disp: 30 tablet, Rfl: 11    Turmeric, Curcuma Longa, (Curcumin Extract) POWD, Use Take 700 mg -  Herbal supplement, Disp: , Rfl:     vitamin B-12 (VITAMIN B-12) 1,000 mcg tablet, Take by mouth daily, Disp: , Rfl:     zinc gluconate 50 mg tablet, Take 50 mg by  mouth daily, Disp: , Rfl:     furosemide (LASIX) 20 mg tablet, Take 1 tablet (20 mg total) by mouth 2 (two) times a day, Disp: 60 tablet, Rfl: 2    methylPREDNISolone 4 MG tablet therapy pack, Use as directed on package (Patient not taking: Reported on 1/27/2025), Disp: 21 each, Rfl: 0    multivitamin (THERAGRAN) TABS, Take 1 tablet by mouth daily (Patient not taking: Reported on 1/27/2025), Disp: , Rfl:     Current Facility-Administered Medications:     lidocaine (XYLOCAINE) 1 % injection 1 mL, 1 mL, Injection, Once,     lidocaine (XYLOCAINE) 1 % injection 1 mL, 1 mL, Injection, Once,     triamcinolone acetonide (KENALOG-40) 40 mg/mL injection 40 mg, 40 mg, Intra-lesional, Once,     triamcinolone acetonide (KENALOG-40) 40 mg/mL injection 40 mg, 40 mg, Intra-articular, Once,     Current Allergies     Allergies as of 01/27/2025 - Reviewed 01/27/2025   Allergen Reaction Noted    Clarithromycin GI Intolerance 09/17/2015    Seasonal ic [cholestatin] Other (See Comments) 10/13/2021            The following portions of the patient's history were reviewed and updated as appropriate: allergies, current medications, past family history, past medical history, past social history, past surgical history and problem list.     Past Medical History:   Diagnosis Date    Allergic     Seasonal    Anxiety     Depression     Knee pain     Pinched nerve     L5, L2    Rheumatoid arthritis (HCC)        Past Surgical History:   Procedure Laterality Date    ROTATOR CUFF REPAIR         Family History   Problem Relation Age of Onset    Arthritis Mother     COPD Father          Medications have been verified.        Objective   /63   Pulse 92   Temp 97.6 °F (36.4 °C) (Temporal)   Resp 20   SpO2 96%        Physical Exam     Physical Exam  Vitals reviewed.   Constitutional:       General: She is awake.   HENT:      Head: Normocephalic.      Right Ear: Tympanic membrane, ear canal and external ear normal.      Left Ear: Tympanic  membrane, ear canal and external ear normal.      Nose: Congestion present.      Mouth/Throat:      Mouth: Mucous membranes are moist.      Pharynx: Uvula midline. Posterior oropharyngeal erythema present.      Tonsils: 3+ on the right. 2+ on the left.   Cardiovascular:      Rate and Rhythm: Normal rate and regular rhythm.      Heart sounds: Normal heart sounds, S1 normal and S2 normal.   Pulmonary:      Breath sounds: Normal breath sounds. No wheezing, rhonchi or rales.   Lymphadenopathy:      Cervical: No cervical adenopathy.   Skin:     General: Skin is warm and dry.   Neurological:      General: No focal deficit present.      Mental Status: She is alert and oriented to person, place, and time.   Psychiatric:         Behavior: Behavior is cooperative.

## 2025-04-24 ENCOUNTER — APPOINTMENT (OUTPATIENT)
Dept: LAB | Facility: CLINIC | Age: 55
End: 2025-04-24
Payer: COMMERCIAL

## 2025-04-24 ENCOUNTER — OFFICE VISIT (OUTPATIENT)
Dept: RHEUMATOLOGY | Facility: CLINIC | Age: 55
End: 2025-04-24
Payer: COMMERCIAL

## 2025-04-24 VITALS
DIASTOLIC BLOOD PRESSURE: 86 MMHG | OXYGEN SATURATION: 98 % | HEART RATE: 100 BPM | BODY MASS INDEX: 39.87 KG/M2 | SYSTOLIC BLOOD PRESSURE: 132 MMHG | HEIGHT: 62 IN

## 2025-04-24 DIAGNOSIS — Z79.899 HIGH RISK MEDICATION USE: ICD-10-CM

## 2025-04-24 DIAGNOSIS — M05.79 RHEUMATOID ARTHRITIS INVOLVING MULTIPLE SITES WITH POSITIVE RHEUMATOID FACTOR (HCC): ICD-10-CM

## 2025-04-24 DIAGNOSIS — M05.79 RHEUMATOID ARTHRITIS INVOLVING MULTIPLE SITES WITH POSITIVE RHEUMATOID FACTOR (HCC): Primary | ICD-10-CM

## 2025-04-24 DIAGNOSIS — M17.12 OSTEOARTHRITIS OF LEFT KNEE, UNSPECIFIED OSTEOARTHRITIS TYPE: ICD-10-CM

## 2025-04-24 LAB
ALBUMIN SERPL BCG-MCNC: 4.7 G/DL (ref 3.5–5)
ALP SERPL-CCNC: 53 U/L (ref 34–104)
ALT SERPL W P-5'-P-CCNC: 18 U/L (ref 7–52)
ANION GAP SERPL CALCULATED.3IONS-SCNC: 10 MMOL/L (ref 4–13)
AST SERPL W P-5'-P-CCNC: 19 U/L (ref 13–39)
BASOPHILS # BLD AUTO: 0.07 THOUSANDS/ÂΜL (ref 0–0.1)
BASOPHILS NFR BLD AUTO: 1 % (ref 0–1)
BILIRUB SERPL-MCNC: 0.37 MG/DL (ref 0.2–1)
BUN SERPL-MCNC: 19 MG/DL (ref 5–25)
CALCIUM SERPL-MCNC: 9.7 MG/DL (ref 8.4–10.2)
CHLORIDE SERPL-SCNC: 106 MMOL/L (ref 96–108)
CO2 SERPL-SCNC: 24 MMOL/L (ref 21–32)
CREAT SERPL-MCNC: 0.7 MG/DL (ref 0.6–1.3)
CRP SERPL QL: 2.7 MG/L
EOSINOPHIL # BLD AUTO: 0.12 THOUSAND/ÂΜL (ref 0–0.61)
EOSINOPHIL NFR BLD AUTO: 1 % (ref 0–6)
ERYTHROCYTE [DISTWIDTH] IN BLOOD BY AUTOMATED COUNT: 14.1 % (ref 11.6–15.1)
ERYTHROCYTE [SEDIMENTATION RATE] IN BLOOD: 13 MM/HOUR (ref 0–29)
GFR SERPL CREATININE-BSD FRML MDRD: 97 ML/MIN/1.73SQ M
GLUCOSE SERPL-MCNC: 79 MG/DL (ref 65–140)
HCT VFR BLD AUTO: 38.9 % (ref 34.8–46.1)
HGB BLD-MCNC: 12.6 G/DL (ref 11.5–15.4)
IMM GRANULOCYTES # BLD AUTO: 0.03 THOUSAND/UL (ref 0–0.2)
IMM GRANULOCYTES NFR BLD AUTO: 0 % (ref 0–2)
LYMPHOCYTES # BLD AUTO: 1.59 THOUSANDS/ÂΜL (ref 0.6–4.47)
LYMPHOCYTES NFR BLD AUTO: 17 % (ref 14–44)
MCH RBC QN AUTO: 29.8 PG (ref 26.8–34.3)
MCHC RBC AUTO-ENTMCNC: 32.4 G/DL (ref 31.4–37.4)
MCV RBC AUTO: 92 FL (ref 82–98)
MONOCYTES # BLD AUTO: 0.81 THOUSAND/ÂΜL (ref 0.17–1.22)
MONOCYTES NFR BLD AUTO: 9 % (ref 4–12)
NEUTROPHILS # BLD AUTO: 6.85 THOUSANDS/ÂΜL (ref 1.85–7.62)
NEUTS SEG NFR BLD AUTO: 72 % (ref 43–75)
NRBC BLD AUTO-RTO: 0 /100 WBCS
PLATELET # BLD AUTO: 355 THOUSANDS/UL (ref 149–390)
PMV BLD AUTO: 9.9 FL (ref 8.9–12.7)
POTASSIUM SERPL-SCNC: 3.9 MMOL/L (ref 3.5–5.3)
PROT SERPL-MCNC: 7.4 G/DL (ref 6.4–8.4)
RBC # BLD AUTO: 4.23 MILLION/UL (ref 3.81–5.12)
SODIUM SERPL-SCNC: 140 MMOL/L (ref 135–147)
WBC # BLD AUTO: 9.47 THOUSAND/UL (ref 4.31–10.16)

## 2025-04-24 PROCEDURE — 85025 COMPLETE CBC W/AUTO DIFF WBC: CPT

## 2025-04-24 PROCEDURE — 85652 RBC SED RATE AUTOMATED: CPT

## 2025-04-24 PROCEDURE — 80053 COMPREHEN METABOLIC PANEL: CPT

## 2025-04-24 PROCEDURE — 99214 OFFICE O/P EST MOD 30 MIN: CPT | Performed by: INTERNAL MEDICINE

## 2025-04-24 PROCEDURE — 86140 C-REACTIVE PROTEIN: CPT

## 2025-04-24 PROCEDURE — 36415 COLL VENOUS BLD VENIPUNCTURE: CPT

## 2025-04-24 RX ORDER — FOLIC ACID 1 MG/1
1 TABLET ORAL DAILY
Qty: 90 TABLET | Refills: 3 | Status: SHIPPED | OUTPATIENT
Start: 2025-04-24

## 2025-04-24 RX ORDER — FAMOTIDINE 20 MG/1
20 TABLET, FILM COATED ORAL 2 TIMES DAILY
Qty: 180 TABLET | Refills: 3 | Status: SHIPPED | OUTPATIENT
Start: 2025-04-24

## 2025-04-24 RX ORDER — TOFACITINIB 11 MG/1
11 TABLET, FILM COATED, EXTENDED RELEASE ORAL DAILY
Qty: 30 TABLET | Refills: 11 | Status: SHIPPED | OUTPATIENT
Start: 2025-04-24

## 2025-04-24 RX ORDER — METHOTREXATE 2.5 MG/1
TABLET ORAL
Qty: 96 TABLET | Refills: 3 | Status: SHIPPED | OUTPATIENT
Start: 2025-04-24

## 2025-04-24 RX ORDER — IBUPROFEN 800 MG/1
800 TABLET, FILM COATED ORAL DAILY PRN
Qty: 90 TABLET | Refills: 3 | Status: SHIPPED | OUTPATIENT
Start: 2025-04-24

## 2025-04-24 RX ORDER — HYDROXYCHLOROQUINE SULFATE 200 MG/1
200 TABLET, FILM COATED ORAL DAILY
Qty: 90 TABLET | Refills: 1 | Status: SHIPPED | OUTPATIENT
Start: 2025-04-24 | End: 2025-10-21

## 2025-04-24 NOTE — ASSESSMENT & PLAN NOTE
Alison Hercules is a 55 y.o.  female who presents for follow up visit for seropostive rheumatoid arthritis. Currently managed and stable on Tofacitinib Citrate (Xeljanz), methotrexate, and hydroxychloroquine. Patient's rheumatologic disease(s) threaten long-term function if not appropriately managed.    Continue ibuprofen 400mg twice a day as needed for joint pain, take with food  Continue famotidine twice a day  Continue folic acid daily  Continue methotrexate 8 tabs once a week  Continue Vit. D daily  Go down on hydroxychloroquine to 1 tab a day; continue regular eye exams  Continue Xeljanz 11mg daily pill   Continue every 3 month labs  Try SalonPas or lidocaine patches for low back pain  Use diclofenac gel as needed for joint pain     Orders:    famotidine (PEPCID) 20 mg tablet; Take 1 tablet (20 mg total) by mouth 2 (two) times a day    folic acid (FOLVITE) 1 mg tablet; Take 1 tablet (1 mg total) by mouth daily    hydroxychloroquine (PLAQUENIL) 200 mg tablet; Take 1 tablet (200 mg total) by mouth daily    methotrexate 2.5 MG tablet; TAKE EIGHT TABLETS BY MOUTH ONCE A WEEK. take all 8 tablets on the same day every week    Tofacitinib Citrate ER (Xeljanz XR) 11 MG TB24; Take 1 tablet (11 mg total) by mouth daily    ibuprofen (MOTRIN) 800 mg tablet; Take 1 tablet (800 mg total) by mouth daily as needed for moderate pain    Diclofenac Sodium (VOLTAREN) 1 %; Apply 2 g topically 4 (four) times a day

## 2025-04-24 NOTE — PROGRESS NOTES
Name: Alison Hercules      : 1970      MRN: [id number]  Encounter Provider: Dipti Lagunas MD  Encounter Date: 2025   Encounter department: [facility]  :  Assessment & Plan  Rheumatoid arthritis involving multiple sites with positive rheumatoid factor (HCC)  Alison Hercules is a 55 y.o.  female who presents for follow up visit for seropostive rheumatoid arthritis. Currently managed and stable on Tofacitinib Citrate (Xeljanz), methotrexate, and hydroxychloroquine. Patient's rheumatologic disease(s) threaten long-term function if not appropriately managed.    Continue ibuprofen 400mg twice a day as needed for joint pain, take with food  Continue famotidine twice a day  Continue folic acid daily  Continue methotrexate 8 tabs once a week  Continue Vit. D daily  Go down on hydroxychloroquine to 1 tab a day; continue regular eye exams  Continue Xeljanz 11mg daily pill   Continue every 3 month labs  Try SalonPas or lidocaine patches for low back pain  Use diclofenac gel as needed for joint pain     Orders:    famotidine (PEPCID) 20 mg tablet; Take 1 tablet (20 mg total) by mouth 2 (two) times a day    folic acid (FOLVITE) 1 mg tablet; Take 1 tablet (1 mg total) by mouth daily    hydroxychloroquine (PLAQUENIL) 200 mg tablet; Take 1 tablet (200 mg total) by mouth daily    methotrexate 2.5 MG tablet; TAKE EIGHT TABLETS BY MOUTH ONCE A WEEK. take all 8 tablets on the same day every week    Tofacitinib Citrate ER (Xeljanz XR) 11 MG TB24; Take 1 tablet (11 mg total) by mouth daily    ibuprofen (MOTRIN) 800 mg tablet; Take 1 tablet (800 mg total) by mouth daily as needed for moderate pain    Diclofenac Sodium (VOLTAREN) 1 %; Apply 2 g topically 4 (four) times a day    Osteoarthritis of left knee, unspecified osteoarthritis type    Orders:    Diclofenac Sodium (VOLTAREN) 1 %; Apply 2 g topically 4 (four) times a day    High risk medication use  Benefits and risks of hydroxychloroquine, including  but not limited to retinal toxicity, corneal deposits, gastrointestinal side effects, and headaches were previously discussed with the patient. She is aware of the need for a regular eye exam to monitor for ocular toxicity while on this medication.     Benefits and risks of methotrexate use, including but not limited to gastrointestinal disturbances such as diarrhea, hair loss, fatigue, stomatitis, leukopenia, lung hypersensitivity reaction, hepatotoxicity, and lymphoma were previously discussed with the patient. Baseline viral hepatitis panel was ordered and returned negative.  CBC,CMP will be regularly monitored. Patient does not plan on having any children. Patient was prescribed Folic Acid 1 mg po daily to take while on methotrexate to help prevent side effects. Patient counseled on abstinence from alcohol while using methotrexate.      Benefits and risks of MISSAEL inhibitor biologic agents like Xeljanz were discussed, and include but are not limited to reactivation of hepatitis B/C or TB, diverticular perforation, hyperlipidemia, hepatotoxicity, VTE, and increased risk of infections. A baseline viral hepatitis panel and TB test was checked. CBC/CMP and lipid panel will be monitored regularly.  Orders:    folic acid (FOLVITE) 1 mg tablet; Take 1 tablet (1 mg total) by mouth daily      Continue ibuprofen 400mg twice a day as needed for joint pain, take with food  Continue famotidine twice a day  Continue folic acid daily  Continue methotrexate 8 tabs once a week  Continue Vit. D daily  Go down on hydroxychloroquine to 1 tab a day; continue regular eye exams  Continue Xeljanz 11mg daily pill   Continue every 3 month labs  Try SalonPas or lidocaine patches for low back pain  Use diclofenac gel as needed for joint pain    Return to clinic in 9/2025        History of Present Illness   AC Hercules is a 55 y.o. female who presents as a follow-up for rheumatoid arthritis. She feels overall well on current regimen of  Xeljanz, Plaquenil and methotrexate. She has intermittent knee swelling with no significant pain. She has back pain for which she takes ibuprofen prn and it helps to some extent.     The following portions of the patient's history were reviewed and updated as appropriate: allergies, current medications, past family history, past medical history, past social history, past surgical history and problem list.     History obtained from: patient    Review of Systems  Rest of ROS are negative except as noted in HPI    Medical History Reviewed by provider this encounter:  Tobacco  Allergies  Meds  Problems  Med Hx  Surg Hx  Fam Hx     .  Past Medical History   Past Medical History:   Diagnosis Date    Allergic     Seasonal    Anxiety     Depression     Knee pain     Pinched nerve     L5, L2    Rheumatoid arthritis (HCC)      Past Surgical History:   Procedure Laterality Date    ROTATOR CUFF REPAIR       Family History   Problem Relation Age of Onset    Arthritis Mother     COPD Father       reports that she has quit smoking. Her smoking use included cigarettes. She has a 35 pack-year smoking history. She has never used smokeless tobacco. She reports current alcohol use. She reports that she does not use drugs.  Current Outpatient Medications   Medication Instructions    brompheniramine-pseudoephedrine-DM 30-2-10 MG/5ML syrup 5 mL, Oral, 4 times daily PRN    cholecalciferol (VITAMIN D3) 2,000 Units    COLLAGEN PO Take by mouth    Diclofenac Sodium (VOLTAREN) 2 g, Topical, 4 times daily    famotidine (PEPCID) 20 mg, Oral, 2 times daily    folic acid (FOLVITE) 1 mg, Oral, Daily    furosemide (LASIX) 20 mg, Oral, 2 times daily    hydroxychloroquine (PLAQUENIL) 200 mg, Oral, Daily    ibuprofen (MOTRIN) 800 mg, Oral, Daily PRN    magnesium gluconate (MAGONATE) 500 mg, Daily    methotrexate 2.5 MG tablet TAKE EIGHT TABLETS BY MOUTH ONCE A WEEK. take all 8 tablets on the same day every week    methylPREDNISolone 4 MG tablet  therapy pack Use as directed on package    Milk Thistle 250 MG CAPS Take by mouth    multivitamin (THERAGRAN) TABS 1 tablet, Daily    predniSONE 10 mg, Oral, Daily, 60mg days 1, 50mg day 2, 40mg day 3, 30mg day 4, 20 mg day 5, 10mg day 6.    pyridoxine (B-6) 100 mg    Turmeric, Curcuma Longa, (Curcumin Extract) POWD Use Take 700 mg -  Herbal supplement    vitamin B-12 (VITAMIN B-12) 1,000 mcg tablet Daily    vitamin C 1,000 mg, Daily    Xeljanz XR 11 mg, Oral, Daily    zinc gluconate 50 mg, Daily     Allergies   Allergen Reactions    Clarithromycin GI Intolerance    Seasonal Ic [Cholestatin] Other (See Comments)     stuffiness      Current Outpatient Medications on File Prior to Visit   Medication Sig Dispense Refill    Ascorbic Acid (vitamin C) 1000 MG tablet Take 1,000 mg by mouth daily      brompheniramine-pseudoephedrine-DM 30-2-10 MG/5ML syrup Take 5 mL by mouth 4 (four) times a day as needed for congestion or cough 120 mL 0    cholecalciferol (VITAMIN D3) 1,000 units tablet Take 2,000 Units by mouth      COLLAGEN PO Take by mouth      magnesium gluconate (MAGONATE) 500 mg tablet Take 500 mg by mouth daily      Milk Thistle 250 MG CAPS Take by mouth      predniSONE 10 mg tablet Take 1 tablet (10 mg total) by mouth daily 60mg days 1, 50mg day 2, 40mg day 3, 30mg day 4, 20 mg day 5, 10mg day 6. 21 tablet 0    pyridoxine (B-6) 100 MG tablet Take 100 mg by mouth      Turmeric, Curcuma Longa, (Curcumin Extract) POWD Use Take 700 mg -  Herbal supplement      vitamin B-12 (VITAMIN B-12) 1,000 mcg tablet Take by mouth daily      zinc gluconate 50 mg tablet Take 50 mg by mouth daily      furosemide (LASIX) 20 mg tablet Take 1 tablet (20 mg total) by mouth 2 (two) times a day 60 tablet 2    methylPREDNISolone 4 MG tablet therapy pack Use as directed on package (Patient not taking: Reported on 1/27/2025) 21 each 0    multivitamin (THERAGRAN) TABS Take 1 tablet by mouth daily (Patient not taking: Reported on 1/27/2025)    "    Current Facility-Administered Medications on File Prior to Visit   Medication Dose Route Frequency Provider Last Rate Last Admin    lidocaine (XYLOCAINE) 1 % injection 1 mL  1 mL Injection Once         lidocaine (XYLOCAINE) 1 % injection 1 mL  1 mL Injection Once         triamcinolone acetonide (KENALOG-40) 40 mg/mL injection 40 mg  40 mg Intra-lesional Once         triamcinolone acetonide (KENALOG-40) 40 mg/mL injection 40 mg  40 mg Intra-articular Once           Social History     Tobacco Use    Smoking status: Former     Current packs/day: 1.00     Average packs/day: 1 pack/day for 35.0 years (35.0 ttl pk-yrs)     Types: Cigarettes    Smokeless tobacco: Never    Tobacco comments:     1 1/2 yrs vaping now juul.   Vaping Use    Vaping status: Every Day    Substances: Nicotine, Flavoring   Substance and Sexual Activity    Alcohol use: Yes     Comment: Social 1-5 times a month    Drug use: Never    Sexual activity: Yes     Partners: Female        Objective   /86   Pulse 100   Ht 5' 2\" (1.575 m)   SpO2 98%   BMI 39.87 kg/m²      Physical Exam  Constitutional:       General: She is not in acute distress.     Appearance: Normal appearance.   HENT:      Nose: Nose normal.      Mouth/Throat:      Mouth: Mucous membranes are moist.      Pharynx: Oropharynx is clear.   Eyes:      Conjunctiva/sclera: Conjunctivae normal.      Pupils: Pupils are equal, round, and reactive to light.   Cardiovascular:      Rate and Rhythm: Normal rate and regular rhythm.      Heart sounds: Normal heart sounds.   Pulmonary:      Effort: No respiratory distress.      Breath sounds: Normal breath sounds. No wheezing.   Abdominal:      Palpations: Abdomen is soft.      Tenderness: There is no abdominal tenderness.   Musculoskeletal:         General:  No swelling.      Cervical back: No rigidity.   Skin:     General: Skin is warm and dry.   Neurological:      Mental Status: She is alert and oriented to person, place, and time. "   Psychiatric:         Mood and Affect: Mood normal.

## 2025-04-24 NOTE — PATIENT INSTRUCTIONS
Continue ibuprofen 400mg twice a day as needed for joint pain, take with food  Continue famotidine twice a day  Continue folic acid daily  Continue methotrexate 8 tabs once a week  Continue Vit. D daily  Go down on hydroxychloroquine to 1 tab a day; continue regular eye exams  Continue Xeljanz 11mg daily pill   Continue every 3 month labs  Try SalonPas or lidocaine patches for low back pain  Use diclofenac gel as needed for joint pain     Return to clinic in 9/2025     
non-pitting

## 2025-04-24 NOTE — ASSESSMENT & PLAN NOTE
Benefits and risks of hydroxychloroquine, including but not limited to retinal toxicity, corneal deposits, gastrointestinal side effects, and headaches were previously discussed with the patient. She is aware of the need for a regular eye exam to monitor for ocular toxicity while on this medication.     Benefits and risks of methotrexate use, including but not limited to gastrointestinal disturbances such as diarrhea, hair loss, fatigue, stomatitis, leukopenia, lung hypersensitivity reaction, hepatotoxicity, and lymphoma were previously discussed with the patient. Baseline viral hepatitis panel was ordered and returned negative.  CBC,CMP will be regularly monitored. Patient does not plan on having any children. Patient was prescribed Folic Acid 1 mg po daily to take while on methotrexate to help prevent side effects. Patient counseled on abstinence from alcohol while using methotrexate.      Benefits and risks of MISSAEL inhibitor biologic agents like Xeljanz were discussed, and include but are not limited to reactivation of hepatitis B/C or TB, diverticular perforation, hyperlipidemia, hepatotoxicity, VTE, and increased risk of infections. A baseline viral hepatitis panel and TB test was checked. CBC/CMP and lipid panel will be monitored regularly.  Orders:    folic acid (FOLVITE) 1 mg tablet; Take 1 tablet (1 mg total) by mouth daily

## 2025-08-11 ENCOUNTER — TELEPHONE (OUTPATIENT)
Age: 55
End: 2025-08-11

## 2025-08-11 ENCOUNTER — APPOINTMENT (OUTPATIENT)
Dept: RADIOLOGY | Facility: CLINIC | Age: 55
End: 2025-08-11
Payer: COMMERCIAL

## 2025-08-11 ENCOUNTER — OFFICE VISIT (OUTPATIENT)
Dept: URGENT CARE | Facility: CLINIC | Age: 55
End: 2025-08-11
Payer: COMMERCIAL

## 2025-08-11 PROBLEM — S83.8X9A: Status: ACTIVE | Noted: 2025-08-11

## 2025-08-15 ENCOUNTER — OFFICE VISIT (OUTPATIENT)
Dept: OBGYN CLINIC | Facility: CLINIC | Age: 55
End: 2025-08-15
Payer: COMMERCIAL

## 2025-08-15 VITALS — BODY MASS INDEX: 40.12 KG/M2 | WEIGHT: 218 LBS | HEIGHT: 62 IN

## 2025-08-15 DIAGNOSIS — G89.29 CHRONIC PAIN OF LEFT KNEE: ICD-10-CM

## 2025-08-15 DIAGNOSIS — M17.12 PRIMARY OSTEOARTHRITIS OF LEFT KNEE: Primary | ICD-10-CM

## 2025-08-15 DIAGNOSIS — M05.79 RHEUMATOID ARTHRITIS INVOLVING MULTIPLE SITES WITH POSITIVE RHEUMATOID FACTOR (HCC): ICD-10-CM

## 2025-08-15 DIAGNOSIS — M25.562 CHRONIC PAIN OF LEFT KNEE: ICD-10-CM

## 2025-08-15 PROCEDURE — 20610 DRAIN/INJ JOINT/BURSA W/O US: CPT | Performed by: ORTHOPAEDIC SURGERY

## 2025-08-15 PROCEDURE — 99214 OFFICE O/P EST MOD 30 MIN: CPT | Performed by: ORTHOPAEDIC SURGERY

## 2025-08-15 RX ADMIN — BUPIVACAINE HYDROCHLORIDE 2 ML: 5 INJECTION, SOLUTION EPIDURAL; INTRACAUDAL; PERINEURAL at 10:30

## 2025-08-15 RX ADMIN — TRIAMCINOLONE ACETONIDE 80 MG: 40 INJECTION, SUSPENSION INTRA-ARTICULAR; INTRAMUSCULAR at 10:30

## 2025-08-19 RX ORDER — TRIAMCINOLONE ACETONIDE 40 MG/ML
80 INJECTION, SUSPENSION INTRA-ARTICULAR; INTRAMUSCULAR
Status: COMPLETED | OUTPATIENT
Start: 2025-08-15 | End: 2025-08-15

## 2025-08-19 RX ORDER — BUPIVACAINE HYDROCHLORIDE 5 MG/ML
2 INJECTION, SOLUTION EPIDURAL; INTRACAUDAL; PERINEURAL
Status: COMPLETED | OUTPATIENT
Start: 2025-08-15 | End: 2025-08-15